# Patient Record
Sex: FEMALE | Race: WHITE
[De-identification: names, ages, dates, MRNs, and addresses within clinical notes are randomized per-mention and may not be internally consistent; named-entity substitution may affect disease eponyms.]

---

## 2017-02-22 ENCOUNTER — HOSPITAL ENCOUNTER (OUTPATIENT)
Dept: HOSPITAL 47 - BARWHC3 | Age: 37
Discharge: HOME | End: 2017-02-22
Payer: COMMERCIAL

## 2017-02-22 VITALS — HEART RATE: 80 BPM | TEMPERATURE: 98.7 F | SYSTOLIC BLOOD PRESSURE: 132 MMHG | DIASTOLIC BLOOD PRESSURE: 83 MMHG

## 2017-02-22 VITALS — BODY MASS INDEX: 44.3 KG/M2

## 2017-02-22 DIAGNOSIS — I11.9: ICD-10-CM

## 2017-02-22 DIAGNOSIS — E89.1: ICD-10-CM

## 2017-02-22 DIAGNOSIS — E55.9: ICD-10-CM

## 2017-02-22 DIAGNOSIS — Z01.818: Primary | ICD-10-CM

## 2017-02-22 DIAGNOSIS — E66.01: ICD-10-CM

## 2017-02-22 DIAGNOSIS — E44.0: ICD-10-CM

## 2017-02-22 DIAGNOSIS — D50.8: ICD-10-CM

## 2017-02-22 DIAGNOSIS — G47.30: ICD-10-CM

## 2017-02-22 DIAGNOSIS — E21.1: ICD-10-CM

## 2017-02-22 LAB
ALP SERPL-CCNC: 61 U/L (ref 38–126)
ALT SERPL-CCNC: 38 U/L (ref 9–52)
ANION GAP SERPL CALC-SCNC: 10 MMOL/L
AST SERPL-CCNC: 24 U/L (ref 14–36)
BUN SERPL-SCNC: 11 MG/DL (ref 7–17)
CALCIUM SPEC-MCNC: 9.9 MG/DL (ref 8.4–10.2)
CH: 29.6
CHCM: 32.4
CHLORIDE SERPL-SCNC: 101 MMOL/L (ref 98–107)
CHOLEST SERPL-MCNC: 174 MG/DL (ref ?–200)
CO2 SERPL-SCNC: 29 MMOL/L (ref 22–30)
EKG: (no result)
ERYTHROCYTE [DISTWIDTH] IN BLOOD BY AUTOMATED COUNT: 4.84 M/UL (ref 3.8–5.4)
ERYTHROCYTE [DISTWIDTH] IN BLOOD: 12.9 % (ref 11.5–15.5)
GLUCOSE SERPL-MCNC: 87 MG/DL (ref 74–99)
HCT VFR BLD AUTO: 44.4 % (ref 34–46)
HDLC SERPL-MCNC: 44 MG/DL (ref 40–60)
HDW: 2.59
HEMOGLOBIN A1C: 5.1 % (ref 4.2–6.1)
HGB BLD-MCNC: 14.1 GM/DL (ref 11.4–16)
IRON SERPL-MCNC: 46 UG/DL (ref 37–170)
MCH RBC QN AUTO: 29.2 PG (ref 25–35)
MCHC RBC AUTO-ENTMCNC: 31.8 G/DL (ref 31–37)
MCV RBC AUTO: 91.7 FL (ref 80–100)
NON-AFRICAN AMERICAN GFR(MDRD): >60
POTASSIUM SERPL-SCNC: 4.5 MMOL/L (ref 3.5–5.1)
PROT SERPL-MCNC: 7.4 G/DL (ref 6.3–8.2)
SODIUM SERPL-SCNC: 140 MMOL/L (ref 137–145)
TIBC SERPL-MCNC: 329 UG/DL (ref 265–497)
TRIGL SERPL-MCNC: 104 MG/DL (ref ?–150)
VIT B12 SERPL-MCNC: 564 PG/ML (ref 239–931)
WBC # BLD AUTO: 12.1 K/UL (ref 3.8–10.6)

## 2017-02-22 PROCEDURE — 85027 COMPLETE CBC AUTOMATED: CPT

## 2017-02-22 PROCEDURE — 99201: CPT

## 2017-02-22 PROCEDURE — 83540 ASSAY OF IRON: CPT

## 2017-02-22 PROCEDURE — 83550 IRON BINDING TEST: CPT

## 2017-02-22 PROCEDURE — 82306 VITAMIN D 25 HYDROXY: CPT

## 2017-02-22 PROCEDURE — 93005 ELECTROCARDIOGRAM TRACING: CPT

## 2017-02-22 PROCEDURE — 84443 ASSAY THYROID STIM HORMONE: CPT

## 2017-02-22 PROCEDURE — 80061 LIPID PANEL: CPT

## 2017-02-22 PROCEDURE — 82746 ASSAY OF FOLIC ACID SERUM: CPT

## 2017-02-22 PROCEDURE — 80323 ALKALOIDS NOS: CPT

## 2017-02-22 PROCEDURE — 82728 ASSAY OF FERRITIN: CPT

## 2017-02-22 PROCEDURE — 80053 COMPREHEN METABOLIC PANEL: CPT

## 2017-02-22 PROCEDURE — 83036 HEMOGLOBIN GLYCOSYLATED A1C: CPT

## 2017-02-22 PROCEDURE — 82607 VITAMIN B-12: CPT

## 2017-02-22 PROCEDURE — 84425 ASSAY OF VITAMIN B-1: CPT

## 2017-02-24 LAB — ANABASINE UR-MCNC: <2 NG/ML (ref ?–2)

## 2017-03-02 ENCOUNTER — HOSPITAL ENCOUNTER (EMERGENCY)
Dept: HOSPITAL 47 - EC | Age: 37
Discharge: HOME | End: 2017-03-02
Payer: COMMERCIAL

## 2017-03-02 VITALS
DIASTOLIC BLOOD PRESSURE: 81 MMHG | RESPIRATION RATE: 18 BRPM | HEART RATE: 72 BPM | TEMPERATURE: 98.1 F | SYSTOLIC BLOOD PRESSURE: 138 MMHG

## 2017-03-02 DIAGNOSIS — R79.1: ICD-10-CM

## 2017-03-02 DIAGNOSIS — Z87.891: ICD-10-CM

## 2017-03-02 DIAGNOSIS — M19.90: ICD-10-CM
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
## 2017-03-02 DIAGNOSIS — Z79.891: ICD-10-CM

## 2017-03-02 DIAGNOSIS — Z79.899: ICD-10-CM

## 2017-03-02 DIAGNOSIS — S39.011A: Primary | ICD-10-CM

## 2017-03-02 DIAGNOSIS — Z88.5: ICD-10-CM

## 2017-03-02 DIAGNOSIS — Z79.01: ICD-10-CM

## 2017-03-02 LAB
INR PPP: 1.7 (ref ?–1.1)
PT BLD: 16.7 SEC (ref 9–12)

## 2017-03-02 PROCEDURE — 36415 COLL VENOUS BLD VENIPUNCTURE: CPT

## 2017-03-02 PROCEDURE — 99284 EMERGENCY DEPT VISIT MOD MDM: CPT

## 2017-03-02 PROCEDURE — 85610 PROTHROMBIN TIME: CPT

## 2017-03-02 NOTE — US
EXAM:

  US Duplex Left Lower Extremity Veins.

 

CLINICAL HISTORY:

  Pain.

 

TECHNIQUE:

  Real-time ultrasound scan of the veins of the left lower extremity with 

color Doppler flow, spectral waveform analysis and compression.

 

COMPARISON:

  No relevant prior studies available.

 

FINDINGS:

  Deep veins:  No DVT in the visualized portions of the left external 

iliac vein, common femoral vein, femoral vein, popliteal vein and 

proximal calf veins.

  Superficial veins:  No thrombus in the visualized greater saphenous 

vein.

  Soft tissues:  No acute findings.  No popliteal cyst.

  Lymph nodes:  Lymph node visualized in the left groin, measuring 1.4 x 

0.9 x 1.5 cm.

 

IMPRESSION:     

1.  No evidence of DVT in the left lower extremity.

2.  Lymph node visualized in the left groin, measuring 1.4 x 0.9 x 1.5 cm.

## 2017-03-02 NOTE — ED
General Adult HPI





- General


Chief complaint: Urogenital


Stated complaint: Groin Pain


Time Seen by Provider: 03/02/17 21:58


Source: patient, RN notes reviewed


Mode of arrival: ambulatory


Limitations: no limitations





- History of Present Illness


Initial comments: 





37-year-old female with a past history of factor II presents to the emergency 

Department chief complaint of left thigh and groin pain.  Patient states she 

had no fall she had no trauma she had no changes in activity level today.  

Patient states that she went to get up and all of a sudden she had this pain to 

her left thigh.  Patient states that she took pain medication at home and 

rested it does not seem to be getting better.  Patient states it's worst 

walking worse with certain movements of the leg and worse to touch in certain 

areas.  Patient states she does have a history of blood clots in the past and 

her INR has been subtherapeutic recently so she was concerned.  Patient states 

that there is no other complaints at this time.Patient states the pain is 

moderate.Patient denies any recent fever, chills, shortness of breath, chest 

pain, back pain, abdominal pain, nausea vomiting, numbness or tingling, dysuria 

or hematuria, constipation or diarrhea, headaches or visual changes, or any 

other current symptoms.





- Related Data


 Home Medications











 Medication  Instructions  Recorded  Confirmed


 


Cyclobenzaprine [Flexeril] 10 mg PO HS 02/22/17 03/02/17


 


Gabapentin 600 mg PO TID 02/22/17 03/02/17


 


HYDROcodone/APAP 7.5-325MG [Norco 1 tab PO TID 02/22/17 03/02/17





7.5-325]   


 


Multivitamins, Thera [Multivitamin] 1 tab PO DAILY 02/22/17 03/02/17


 


Phentermine HCl 37.5 mg PO DAILY 02/22/17 03/02/17


 


Warfarin [Coumadin] 7.5 mg PO SUMOWEFRSA 02/22/17 03/02/17


 


Warfarin [Coumadin] 5 mg PO TUTH 03/02/17 03/02/17











 Allergies











Allergy/AdvReac Type Severity Reaction Status Date / Time


 


morphine Allergy  Confusion Verified 03/02/17 22:25














Review of Systems


ROS Statement: 


Those systems with pertinent positive or pertinent negative responses have been 

documented in the HPI.





ROS Other: All systems not noted in ROS Statement are negative.





Past Medical History


Past Medical History: Blood Disorder, Osteoarthritis (OA)


Additional Past Medical History / Comment(s): factor 2 disorder severe 

spondylosis of back


History of Any Multi-Drug Resistant Organisms: None Reported


Past Surgical History: Tonsillectomy


Additional Past Surgical History / Comment(s): splenectomy


Past Anesthesia/Blood Transfusion Reactions: No Reported Reaction


Past Psychological History: No Psychological Hx Reported


Smoking Status: Former smoker


Past Alcohol Use History: None Reported


Additional Past Alcohol Use History / Comment(s): quit 15 years ago


Past Drug Use History: None Reported





General Exam





- General Exam Comments


Initial Comments: 





General:  The patient is awake and alert, in no distress, and does not appear 

acutely ill.   


Neck:  The neck is supple, there is no tenderness.


Cardiovascular:  There is a regular rate and rhythm. No murmur, rub or gallop 

is appreciated.


Respiratory:  Lungs are clear to auscultation, respirations are non-labored, 

breath sounds are equal.  No wheezes, stridor, rales, or rhonchi.


Musculoskeletal: Sensation intact with pupils.  Left lower extremity.  Patient'

s range of motion of left ankle and left knee.  Patient is


Social left hip however she does have pain with ab duction as well as pain with 

forward flexion of the left leg.  There is pain along the medial aspect of the 

left thigh.  No swelling.


Neurological:  CN II-XII intact, There are no obvious motor or sensory 

deficits. Coordination appears grossly intact. Speech is normal.


Skin:  Skin is warm and dry and no rashes or lesions are noted. 


Psychiatric:  Normal mood and affect.  


Limitations: no limitations





Course


 Vital Signs











  03/02/17





  21:52


 


Temperature 97.5 F L


 


Pulse Rate 80


 


Respiratory 20





Rate 


 


Blood Pressure 170/79


 


O2 Sat by Pulse 98





Oximetry 














Medical Decision Making





- Medical Decision Making





37-year-old female notes for left groin pain.  At this time we will within THE 

DVT DUE TO PATIENT'S HISTORY.  At this time patient's ultrasound is negative 

for acute DVT.  Patient has





Lymph node.  This time patient's exam is not consistent with a left groin 

sprain.  We did discuss care follow-up and return parameters and all the patient

's questions.  She states she understood she hasn't injured and plan.  Patient 

will be discharged home.





- Lab Data


 Lab Results











  03/02/17 Range/Units





  22:13 


 


PT  16.7 H  (9.0-12.0)  sec


 


INR  1.7  (<1.1)  














- Radiology Data


Radiology results: report reviewed, image reviewed





Disposition


Clinical Impression: 


 Strain of left inguinal muscle, Subtherapeutic international normalized ratio (

INR)





Disposition: HOME SELF-CARE


Condition: Stable


Instructions:  Groin Strain (ED)


Additional Instructions: 


Please use medication as discussed. Please follow up with family doctor if 

symptoms have not improved over the next two days. Please return to the 

emergency room if your symptoms increase or worsen or for any other concerns. 


Referrals: 


Diogenes Kimbrough Jr, DO [Primary Care Provider] - 1-2 days


Time of Disposition: 23:27

## 2017-03-15 ENCOUNTER — HOSPITAL ENCOUNTER (OUTPATIENT)
Dept: HOSPITAL 47 - ORWHC2ENDO | Age: 37
Discharge: HOME | End: 2017-03-15
Payer: COMMERCIAL

## 2017-03-15 VITALS — DIASTOLIC BLOOD PRESSURE: 69 MMHG | SYSTOLIC BLOOD PRESSURE: 112 MMHG | HEART RATE: 60 BPM

## 2017-03-15 VITALS — BODY MASS INDEX: 44.1 KG/M2

## 2017-03-15 VITALS — TEMPERATURE: 97.1 F

## 2017-03-15 VITALS — RESPIRATION RATE: 18 BRPM

## 2017-03-15 DIAGNOSIS — Z82.49: ICD-10-CM

## 2017-03-15 DIAGNOSIS — Z79.01: ICD-10-CM

## 2017-03-15 DIAGNOSIS — Z87.891: ICD-10-CM

## 2017-03-15 DIAGNOSIS — K29.50: ICD-10-CM

## 2017-03-15 DIAGNOSIS — I26.99: ICD-10-CM

## 2017-03-15 DIAGNOSIS — Z79.899: ICD-10-CM

## 2017-03-15 DIAGNOSIS — Z88.5: ICD-10-CM

## 2017-03-15 DIAGNOSIS — K21.0: Primary | ICD-10-CM

## 2017-03-15 PROCEDURE — 88305 TISSUE EXAM BY PATHOLOGIST: CPT

## 2017-03-15 PROCEDURE — 81025 URINE PREGNANCY TEST: CPT

## 2017-03-15 PROCEDURE — 88342 IMHCHEM/IMCYTCHM 1ST ANTB: CPT

## 2017-03-15 PROCEDURE — 43239 EGD BIOPSY SINGLE/MULTIPLE: CPT

## 2017-03-15 NOTE — P.PCN
Date of Procedure: 03/15/17


Description of Procedure: 











PREOPERATIVE DIAGNOSIS:


Gastroesophageal reflux disease.





POSTOPERATIVE DIAGNOSIS:


Chronic gastritis.


Gastroesophageal reflux disease with esophagitis.





OPERATION:


Esophagogastroduodenoscopy with biopsies along antrum.





SURGEON: Lora Arias MD





ANESTHESIA: MAC.





INDICATIONS:


The patient is a 37-year-old female who presents with a history of reflux 

disease. Benefits and risks of the procedure were described. Informed consent 

was obtained.





DESCRIPTION:


The patient was brought into the endoscopy suite and laid in the left lateral 

decubitus position. An Olympus gastroscope was passed along the posterior 

oropharynx down to the distal esophagus where the squamocolumnar junction was 

encountered at 39 cm from the incisors. The stomach was entered and minimal 

bile reflux was found.  Additional findings are listed below. Biopsies with 

cold forceps were obtained of the antrum. The first through third portion of 

the duodenum was examined and unremarkable. Retroflexion of the scope confirmed

  Hill grade 2 lower esophageal valve. The squamocolumnar junction demostrated 

LA grade A erosive esophagitis. The stomach was desufflated. The patient 

tolerated the procedure well.





FINDINGS:


Squamocolumnar junction 39 cm from the incisors.


Diaphragmatic hiatus at 39 cm from the incisors


Hill grade 2lower esophageal valve.


LA grade A erosive esophagitis.


Active gastritis superficial along antrum.


No active duodenitis.





RECOMMENDATIONS:


Further recommendations pending results of pathology report.





Plan - Discharge Summary


Discharge Medication List





Cyclobenzaprine [Flexeril] 10 mg PO HS 02/22/17 [History]


Gabapentin 600 mg PO BID 02/22/17 [History]


HYDROcodone/APAP 7.5-325MG [Norco 7.5-325] 1 tab PO TID PRN 02/22/17 [History]


Multivitamins, Thera [Multivitamin] 1 tab PO DAILY 02/22/17 [History]


Phentermine HCl 37.5 mg PO DAILY 02/22/17 [History]


Warfarin [Coumadin] 7.5 mg PO SUMOWEFRSA 02/22/17 [History]


Warfarin [Coumadin] 5 mg PO TUTH 03/02/17 [History]


Gabapentin [Neurontin] 1,200 mg PO HS 03/10/17 [History]

## 2017-03-15 NOTE — P.GSHP
History of Present Illness


H&P Date: 03/15/17














CHIEF COMPLAINT: GERD





HISTORY OF PRESENT ILLNESS: The patient is a 37-year-old female who


presents reports gastroesophageal reflux disease.  Upper endoscopy was offered 

for further evaluation and management.





PAST MEDICAL HISTORY: 


Please see list.





PAST SURGICAL HISTORY: 


Please see list.





MEDICATIONS: 


Please see list.





ALLERGIES:  Please see list. 





SOCIAL HISTORY: No illicit drug use





FAMILY HISTORY: No reports of Crohn disease or ulcerative colitis. 





REVIEW OF ORGAN SYSTEMS: 


CONSTITUTIONAL: No reports of fevers or chills. 


GI:  Denies any blood in stools or constipation. 





PHYSICAL EXAM: 


VITAL SIGNS:  Stable


GENERAL: Well-developed and pleasant in no acute distress. 


HEENT: No scleral icterus. Extraocular movements grossly


intact. Moist buccal mucosa. 


NECK: Supple without lymphadenopathy. 


CHEST: Unlabored respirations. Equal bilateral excursions. 


CARDIOVASCULAR: Regular rate and rhythm. Distal 2+ pulses. 


ABDOMEN: Soft, nondistended.  


MUSCULOSKELETAL: No clubbing, cyanosis, or edema. 





ASSESSMENT: 


1.  Gastroesophageal reflux disease





PLAN: 


1. Recommend proceeding with an upper endoscopy





Past Medical History


Past Medical History: Blood Disorder, Osteoarthritis (OA), Pulmonary Embolus (PE

)


Additional Past Medical History / Comment(s): factor 2 clotting disorder, 

severe spondylosis of back,has Mirena IUD


History of Any Multi-Drug Resistant Organisms: None Reported


Past Surgical History: Cholecystectomy, Tonsillectomy


Additional Past Surgical History / Comment(s): splenectomy


Past Anesthesia/Blood Transfusion Reactions: Motion Sickness


Additional Past Anesthesia/Blood Transfusion Reaction / Comment(s): no problems 

with prior blood transfusions


Past Psychological History: No Psychological Hx Reported


Smoking Status: Former smoker


Past Alcohol Use History: None Reported


Additional Past Alcohol Use History / Comment(s): quit 15 years ago,smoked 

approx 8 <1ppd


Past Drug Use History: None Reported





- Past Family History


  ** Mother


Family Medical History: Pulmonary Embolus


Additional Family Medical History / Comment(s): Factor 2 clotting disorder





  ** Father


Family Medical History: CVA/TIA, Diabetes Mellitus, Hypertension, Myocardial 

Infarction (MI)





Medications and Allergies


 Home Medications











 Medication  Instructions  Recorded  Confirmed  Type


 


Cyclobenzaprine [Flexeril] 10 mg PO HS 02/22/17 03/10/17 History


 


Gabapentin 600 mg PO BID 02/22/17 03/10/17 History


 


HYDROcodone/APAP 7.5-325MG [Norco 1 tab PO TID PRN 02/22/17 03/10/17 History





7.5-325]    


 


Multivitamins, Thera [Multivitamin] 1 tab PO DAILY 02/22/17 03/10/17 History


 


Phentermine HCl 37.5 mg PO DAILY 02/22/17 03/10/17 History


 


Warfarin [Coumadin] 7.5 mg PO SUMOWEFRSA 02/22/17 03/10/17 History


 


Warfarin [Coumadin] 5 mg PO TUTH 03/02/17 03/10/17 History


 


Gabapentin [Neurontin] 1,200 mg PO HS 03/10/17 03/10/17 History











 Allergies











Allergy/AdvReac Type Severity Reaction Status Date / Time


 


morphine Allergy  unresponsiv Verified 03/10/17 15:52





   e

## 2017-04-03 NOTE — CONS
DATE OF CONSULTATION:  02/22/ 2017. 



CHIEF COMPLAINT: Initial bariatric assessment. 



HISTORY OF PRESENT ILLNESS: 

Marlin Zhong is a 37-year-old female who presents for initial 

bariatric assessment. At her height of 5 foot 7 and quarter inch frame 

she comes in weighing 285 pounds. Her ideal body weight is 178 pounds. 

She is 107 pounds overweight. Body mass index is 44.3. She reports a 

personal history of factor II disorder as a result she is on Coumadin. 

She reports previous history of pulmonary embolism. She denies any 

dyspnea however. She is evaluating for sleeve gastrectomy. She denies any 

familial history of bariatric procedures or obesity. Her highest 

personal weight has been 319 pounds. She has been successful to get 

down to 285 pounds at present. She is currently on Weight Watchers. 

The most she has been able to lose is 40 pounds. She has been also on 

Adipex since last November. She has completed at minimum 4 month 

medical supervised weight loss. She has to do a 6 month medical 

supervised weight loss. As a result of her obesity, she reports lower 

back pain for the past 8 years. She also reports troubles 

with the left hip as well as sciatica. She has foot drop. She reports 

obstructive sleep apnea. No reports of lupus or Crohn's disease in her 

family. She denies any food allergies or diarrhea. She does have 

constipation. Her gallbladder is gone. She reports previous history of 

splenectomy from a spontaneous rupture. She has already obtained her 

immunizations for asplenia. Now she presents for further evaluation 

and management.  



PAST MEDICAL HISTORY: 

1. 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 

2. Factor II disorder. 

3. Previous history of pulmonary embolism to the lungs. 

4. Osteoarthritis of the lower back. 

5. Osteoarthritis of the left hip. 

6. Sciatica of the left leg. 

7. History of foot drop. 

8. Obstructive sleep apnea. 

9. Morbid obesity due to excess calories. 

10. Chronic pain syndrome. 

11. History of spondylolisthesis. 



PAST SURGICAL HISTORY: 

1. Tonsillectomy. 

2. Splenectomy. 

3. Cholecystectomy. 



MEDICATIONS:

1. Gabapentin. 

2. Norco 7.5.

3. Flexeril. 

4. Adipex.

5. Multivitamin. 

6. Coumadin. 



ALLERGIES: MORPHINE. 



SOCIAL HISTORY: Former tobacco user. 



No reports of esophageal or stomach cancer. 



REVIEW OF SYSTEMS:

CONSTITUTIONAL: Ideal body weight of 178 pounds. Highest weight was 

315 pounds. Current weight of 285 pounds. She is 107 pounds 

overweight. Body mass index of 44.3.  

HEENT: No troubles with vision, hearing. Denies dysphagia. 

ENDOCRINE: No reports of diabetes or thyroid disorders. 

RESPIRATORY: Denies any dyspnea on exertion. She has previous history 

of pulmonary embolism.  

CARDIOVASCULAR: No reports of palpitations or heart attack. 

GASTROINTESTINAL: No reports of food allergies. No reports of 

diarrhea; however, she has constipation.  

MUSCULOSKELETAL: Has spondylosis of the lower back. Chronic lower back 

pain. Also reports osteoarthritis of the hip and sciatica.  

NEURO: No reports of stroke or seizure disorder. Has chronic pain. 

PSYCH: No reports of depression or suicidal ideation.  

HEMATOLOGIC: History of hypercoagulable disorder. Previous history of 

pulmonary embolism. She is on chronic Coumadin therapy.  



PHYSICAL EXAM:

VITAL SIGNS: 98.7, 80, 132/83; 5 foot 7 and quarter inch frame, 285 

pounds. Body mass index 44.3.  

GENERAL: Well-developed, pleasant female in no acute distress. 

HEENT: No scleral icterus. Extraocular movements grossly intact. Moist 

buccal mucosa.  

NECK: Supple without lymphadenopathy. 

CHEST: Nonlabored respirations with equal breath excursions. 

CARDIOVASCULAR: Regular rate and rhythm. 

ABDOMEN: Soft, nontender, nondistended. 

MUSCULOSKELETAL: No clubbing, cyanosis, or edema. 

NEURO: No focal or lateralizing signs. 

PSYCH: Appropriate affect. Alert and oriented to person, place, and 

time.  



LABS: White count elevated at 12.1. Platelets elevated at 544. Percent 

iron saturation low at 14. Hemoglobin A1c normal at 5.1. Cholesterol 

normal at 174. LDL elevated at 109. Vitamin D low normal at 29.1. 

Urine cotinine completely negative.  



STUDIES: EKG reviewed, demonstrates normal sinus rhythm. 



ASSESSMENT:

1. Morbid obesity due to excess calories. 

2. Body mass index 44.3. 

3. Medical supervised weight loss. 

4. Dietary surveillance and counseling. 

5. Personal history of hypercoagulable disorder factor II. 

6. Previous history of pulmonary embolism. 

7. Spondylosis of the lower spine. 

8. Osteoarthritis of the lower back. 

9. Osteoarthritis of the left hip. 

10. Previous history of foot drop. 

11. Obstructive sleep apnea. 

12. Asplenia. 

13. Hypertension. 

14. 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 

15. 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 

16. Vitamin D deficiency. 



PLAN:

1. I have recommended she complete a bariatric metabolic panel. 

Vitamin D supplement at least 1000 units would be of benefit.  

2. Recommend medical risk assessment. 

3. She had completed her 6 month medical supervised weight loss per 

insurance guidelines.  

4. Psych assessment per insurance guidelines. 

5. Recommend upper endoscopy as she is evaluating for gastrectomy-type 

procedures.  

6. With her history of factor II disorder including Coumadin, she is 

high surgical risk for thromboembolic event. Additionally, she is also 

high surgical risk for bleeding as she is on chronic anticoagulation.  

7. Recommend further evaluation and treatment for obstructive sleep 

apnea.  

8. [place] bariatric surgery collaborative outcomes calculator was reviewed 
for the sleeve, band, and gastric bypass including risks.



Thank you for this kind consultation. 



SUMMER

## 2017-04-05 ENCOUNTER — HOSPITAL ENCOUNTER (OUTPATIENT)
Dept: HOSPITAL 47 - BARWHC3 | Age: 37
Discharge: HOME | End: 2017-04-05
Payer: COMMERCIAL

## 2017-04-05 VITALS — BODY MASS INDEX: 44.6 KG/M2

## 2017-04-05 VITALS — SYSTOLIC BLOOD PRESSURE: 160 MMHG | TEMPERATURE: 98.8 F | HEART RATE: 74 BPM | DIASTOLIC BLOOD PRESSURE: 84 MMHG

## 2017-04-05 DIAGNOSIS — D47.3: ICD-10-CM

## 2017-04-05 DIAGNOSIS — M16.12: ICD-10-CM

## 2017-04-05 DIAGNOSIS — K29.50: ICD-10-CM

## 2017-04-05 DIAGNOSIS — G89.4: ICD-10-CM

## 2017-04-05 DIAGNOSIS — E55.9: ICD-10-CM

## 2017-04-05 DIAGNOSIS — M43.10: ICD-10-CM

## 2017-04-05 DIAGNOSIS — D68.2: ICD-10-CM

## 2017-04-05 DIAGNOSIS — Z01.818: Primary | ICD-10-CM

## 2017-04-05 DIAGNOSIS — Z88.5: ICD-10-CM

## 2017-04-05 DIAGNOSIS — M21.379: ICD-10-CM

## 2017-04-05 DIAGNOSIS — M47.9: ICD-10-CM

## 2017-04-05 DIAGNOSIS — D72.829: ICD-10-CM

## 2017-04-05 DIAGNOSIS — Z90.81: ICD-10-CM

## 2017-04-05 DIAGNOSIS — Z79.01: ICD-10-CM

## 2017-04-05 DIAGNOSIS — E66.01: ICD-10-CM

## 2017-04-05 DIAGNOSIS — Z87.891: ICD-10-CM

## 2017-04-05 DIAGNOSIS — M54.32: ICD-10-CM

## 2017-04-05 DIAGNOSIS — Z86.711: ICD-10-CM

## 2017-04-05 DIAGNOSIS — Z79.899: ICD-10-CM

## 2017-04-05 PROCEDURE — 99211 OFF/OP EST MAY X REQ PHY/QHP: CPT

## 2017-04-09 NOTE — P.PN
Progress Note - Text





DATE OF CONSULTATION:  02/22/ 2017. 





CHIEF COMPLAINT: Initial bariatric assessment. 





HISTORY OF PRESENT ILLNESS: 


Marlin Zhong is a 37-year-old female who presents for initial 


bariatric assessment. At her height of 5 foot 7 and quarter inch frame 


she comes in weighing 285 pounds. Her ideal body weight is 178 pounds. 


She is 107 pounds overweight. Body mass index is 44.3. She reports a 


personal history of factor II disorder as a result she is on Coumadin. 


She reports previous history of pulmonary embolism. She denies any 


dyspnea however. She is evaluating for sleeve gastrectomy. She denies any 


familial history of bariatric procedures or obesity. Her highest 


personal weight has been 319 pounds. She has been successful to get 


down to 285 pounds at present. She is currently on Weight Watchers. 


The most she has been able to lose is 40 pounds. She has been also on 


Adipex since last November. She has completed at minimum 4 month 


medical supervised weight loss. She has to do a 6 month medical 


supervised weight loss. As a result of her obesity, she reports lower 


back pain for the past 8 years. She also reports troubles 


with the left hip as well as sciatica. She has foot drop. She reports 


obstructive sleep apnea. No reports of lupus or Crohn's disease in her 


family. She denies any food allergies or diarrhea. She does have 


constipation. Her gallbladder is gone. She reports previous history of 


splenectomy from a spontaneous rupture. She has already obtained her 


immunizations for asplenia. Now she presents for further evaluation 


and management.  





PAST MEDICAL HISTORY: 


1. 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 


2. Factor II disorder. 


3. Previous history of pulmonary embolism to the lungs. 


4. Osteoarthritis of the lower back. 


5. Osteoarthritis of the left hip. 


6. Sciatica of the left leg. 


7. History of foot drop. 


8. Obstructive sleep apnea. 


9. Morbid obesity due to excess calories. 


10. Chronic pain syndrome. 


11. History of spondylolisthesis. 





PAST SURGICAL HISTORY: 


1. Tonsillectomy. 


2. Splenectomy. 


3. Cholecystectomy. 





MEDICATIONS:


1. Gabapentin. 


2. Norco 7.5.


3. Flexeril. 


4. Adipex.


5. Multivitamin. 


6. Coumadin. 





ALLERGIES: MORPHINE. 





SOCIAL HISTORY: Former tobacco user. 





No reports of esophageal or stomach cancer. 





REVIEW OF SYSTEMS:


CONSTITUTIONAL: Ideal body weight of 178 pounds. Highest weight was 


315 pounds. Current weight of 285 pounds. She is 107 pounds 


overweight. Body mass index of 44.3.  


HEENT: No troubles with vision, hearing. Denies dysphagia. 


ENDOCRINE: No reports of diabetes or thyroid disorders. 


RESPIRATORY: Denies any dyspnea on exertion. She has previous history 


of pulmonary embolism.  


CARDIOVASCULAR: No reports of palpitations or heart attack. 


GASTROINTESTINAL: No reports of food allergies. No reports of 


diarrhea; however, she has constipation.  


MUSCULOSKELETAL: Has spondylosis of the lower back. Chronic lower back 


pain. Also reports osteoarthritis of the hip and sciatica.  


NEURO: No reports of stroke or seizure disorder. Has chronic pain. 


PSYCH: No reports of depression or suicidal ideation.  


HEMATOLOGIC: History of hypercoagulable disorder. Previous history of 


pulmonary embolism. She is on chronic Coumadin therapy.  





PHYSICAL EXAM:


VITAL SIGNS: 98.7, 80, 132/83; 5 foot 7 and quarter inch frame, 285 


pounds. Body mass index 44.3.  


GENERAL: Well-developed, pleasant female in no acute distress. 


HEENT: No scleral icterus. Extraocular movements grossly intact. Moist 


buccal mucosa.  


NECK: Supple without lymphadenopathy. 


CHEST: Nonlabored respirations with equal breath excursions. 


CARDIOVASCULAR: Regular rate and rhythm. 


ABDOMEN: Soft, nontender, nondistended. 


MUSCULOSKELETAL: No clubbing, cyanosis, or edema. 


NEURO: No focal or lateralizing signs. 


PSYCH: Appropriate affect. Alert and oriented to person, place, and 


time.  





LABS: White count elevated at 12.1. Platelets elevated at 544. Percent 


iron saturation low at 14. Hemoglobin A1c normal at 5.1. Cholesterol 


normal at 174. LDL elevated at 109. Vitamin D low normal at 29.1. 


Urine cotinine completely negative.  





STUDIES: EKG reviewed, demonstrates normal sinus rhythm. 





ASSESSMENT:


1. Morbid obesity due to excess calories. 


2. Body mass index 44.3. 


3. Medical supervised weight loss. 


4. Dietary surveillance and counseling. 


5. Personal history of hypercoagulable disorder factor II. 


6. Previous history of pulmonary embolism. 


7. Spondylosis of the lower spine. 


8. Osteoarthritis of the lower back. 


9. Osteoarthritis of the left hip. 


10. Previous history of foot drop. 


11. Obstructive sleep apnea. 


12. Asplenia. 


13. Hypertension. 


14. 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 


15. 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 


16. Vitamin D deficiency. 





PLAN:


1. I have recommended she complete a bariatric metabolic panel. 


Vitamin D supplement at least 1000 units would be of benefit.  


2. Recommend medical risk assessment. 


3. She had completed her 6 month medical supervised weight loss per 


insurance guidelines.  


4. Psych assessment per insurance guidelines. 


5. Recommend upper endoscopy as she is evaluating for gastrectomy-type 


procedures.  


6. With her history of factor II disorder including Coumadin, she is 


high surgical risk for thromboembolic event. Additionally, she is also 


high surgical risk for bleeding as she is on chronic anticoagulation.  


7. Recommend further evaluation and treatment for obstructive sleep 


apnea.  


8. [place] bariatric surgery collaborative outcomes calculator was reviewed 

for the sleeve, band, and gastric bypass including risks.





Thank you for this kind consultation.

## 2017-04-22 NOTE — PN
DATE OF SERVICE: 04/05/2017 



CHIEF COMPLAINT: Bariatric assessment. 



HISTORY OF PRESENT ILLNESS: 

Marlin Zhong is a very pleasant 37-year-old female who initially 

presented to the [facility] in February 2017. She reports 

previous history of pulmonary embolism including Factor II disorder as 

she is on Coumadin. At her height of 5 feet 7-1/4 inches she comes in 

today weighing 286 pounds. She has actually gained 2 pounds in the 

past 2 months. Body mass index has now increased from 44.4 up to 44.6. 

She is 128 pounds overweight. Separately, she had presented to the ER 

early last month for groin pain. She states this has now resolved. She 

also completed an upper endoscopy with findings consistent with 

gastroesophageal reflux disease, erosive esophagitis. Chronic 

gastritis was also identified. No large diaphragmatic hiatal hernia 

was identified.  





PAST MEDICAL HISTORY: 

1. 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 

2. Factor II disorder. 

3. Previous history of pulmonary embolism to the lungs. 

4. Osteoarthritis of the lower back. 

5. Osteoarthritis of the left hip. 

6. Sciatica of the left leg. 

7. History of foot drop. 

8. Obstructive sleep apnea. 

9. Morbid obesity due to excess calories. 

10. Chronic pain syndrome. 

11. History of spondylolisthesis. 



PAST SURGICAL HISTORY: 

1. Tonsillectomy. 

2. Splenectomy. 

3. Cholecystectomy. 



MEDICATIONS:

1. Gabapentin. 

2. Norco 7.5.

3. Flexeril. 

4. Adipex.

5. Multivitamin. 

6. Coumadin. 



ALLERGIES: MORPHINE. 



SOCIAL HISTORY: Former tobacco user. 



FAMILY HISTORY: No reports of esophageal or stomach cancer. 



REVIEW OF SYSTEMS:

CONSTITUTIONAL: Ideal body weight of 178 pounds. Highest weight was 

315 pounds. At her height of 5 feet 7-1/4 inches she comes in 

today weighing 286 pounds. She has actually gained 2 pounds in the 

past 2 months. Body mass index has now increased from 44.4 up to 44.6. 

She is 128 pounds overweight. 

HEENT: No troubles with vision, hearing. Denies dysphagia. 

ENDOCRINE: No reports of diabetes or thyroid disorders. 

RESPIRATORY: Denies any dyspnea on exertion. She has previous history 

of pulmonary embolism.  

CARDIOVASCULAR: No reports of palpitations or heart attack. 

GASTROINTESTINAL: No reports of food allergies. No reports of 

diarrhea; however, she has constipation.  

MUSCULOSKELETAL: Has spondylosis of the lower back. Chronic lower back 

pain. Also reports osteoarthritis of the hip and sciatica.  

NEURO: No reports of stroke or seizure disorder. Has chronic pain. 

PSYCH: No reports of depression or suicidal ideation.  

HEMATOLOGIC: History of hypercoagulable disorder. Previous history of 

pulmonary embolism. She is on chronic Coumadin therapy.  



PHYSICAL EXAM:

VITAL SIGNS: 98.8, 74, 160/84; 5 foot 7-1/4 frame, 286 

pounds. Body mass index 44.3.  

GENERAL: Well-developed, pleasant female in no acute distress. 

HEENT: No scleral icterus. Extraocular movements grossly intact. Moist 

buccal mucosa.  

NECK: Supple without lymphadenopathy. 

CHEST: Nonlabored respirations with equal breath excursions. 

CARDIOVASCULAR: Regular rate and rhythm. 

ABDOMEN: Soft, nontender, nondistended. 

MUSCULOSKELETAL: No clubbing, cyanosis, or edema. 

NEURO: No focal or lateralizing signs. 

PSYCH: Appropriate affect. Alert and oriented to person, place, and 

time.  



Pathology report demonstrated no evidence of H. pylori bacteria. 

Chronic gastritis was identified.  



LABS: Bariatric metabolic panel demonstrated elevated white count of 

over 12,000. Platelet count was also elevated at 544. Percent iron 

saturation was low at 14. Vitamin D was slightly low at 29.9. Urine 

nicotine was negative. 



EKG demonstrated normal sinus rhythm.  



ASSESSMENT: 

1. Morbid obesity due to excess calories. 

2. Body mass index increased to 44.6. 

3. Medical supervised weight loss. 

4. Dietary surveillance and counseling. 

5. Personal history of hypercoagulable disorder factor II. 

6. Previous history of pulmonary embolism. 

7. Spondylosis of the lower spine. 

8. Osteoarthritis of the lower back. 

9. Osteoarthritis of the left hip. 

10. Previous history of foot drop. 

11. Obstructive sleep apnea. 

12. Asplenia. 

13. Hypertension. 

14. 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 

15. 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 

16. Vitamin D deficiency. 

17. Chronic gastritis. 

18. Chronic anticoagulation. 



PLAN: 

1. I reviewed her labs, including elevated white count and platelets. 

She then disclosed this is secondary to her spleen being removed, 

which is consistent with her asplenia.

2. On further review of her options, she has elected for a sleeve gastrectomy. 

3. She is still pending completion of her psych assessment. 

4. She is still pending completion of bariatric dietitian. 

5. She is at increased risk for bleeding as she is on chronic Coumadin 

therapy. Additionally with her factor II disorder, additional 

recommendations per her hematologist will be advisable.  

6. She will return upon completion of her bariatric profile, which 

includes both her psych and medical risk assessment, including dietary 

classes.  

7. Her vitamin D is low for which 1000 units of vitamin D 

supplement would be advisable.  



SUMMER

## 2017-04-28 NOTE — P.PN
Progress Note - Text











DATE OF SERVICE: 04/05/2017 





CHIEF COMPLAINT: Bariatric assessment. 





HISTORY OF PRESENT ILLNESS: 


Marlin Zhong is a very pleasant 37-year-old female who initially 


presented to the [facility] in February 2017. She reports 


previous history of pulmonary embolism including Factor II disorder as 


she is on Coumadin. At her height of 5 feet 7-1/4 inches she comes in 


today weighing 286 pounds. She has actually gained 2 pounds in the 


past 2 months. Body mass index has now increased from 44.4 up to 44.6. 


She is 128 pounds overweight. Separately, she had presented to the ER 


early last month for groin pain. She states this has now resolved. She 


also completed an upper endoscopy with findings consistent with 


gastroesophageal reflux disease, erosive esophagitis. Chronic 


gastritis was also identified. No large diaphragmatic hiatal hernia 


was identified.  








PAST MEDICAL HISTORY: 


1. 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 


2. Factor II disorder. 


3. Previous history of pulmonary embolism to the lungs. 


4. Osteoarthritis of the lower back. 


5. Osteoarthritis of the left hip. 


6. Sciatica of the left leg. 


7. History of foot drop. 


8. Obstructive sleep apnea. 


9. Morbid obesity due to excess calories. 


10. Chronic pain syndrome. 


11. History of spondylolisthesis. 





PAST SURGICAL HISTORY: 


1. Tonsillectomy. 


2. Splenectomy. 


3. Cholecystectomy. 





MEDICATIONS:


1. Gabapentin. 


2. Norco 7.5.


3. Flexeril. 


4. Adipex.


5. Multivitamin. 


6. Coumadin. 





ALLERGIES: MORPHINE. 





SOCIAL HISTORY: Former tobacco user. 





FAMILY HISTORY: No reports of esophageal or stomach cancer. 





REVIEW OF SYSTEMS:


CONSTITUTIONAL: Ideal body weight of 178 pounds. Highest weight was 


315 pounds. At her height of 5 feet 7-1/4 inches she comes in 


today weighing 286 pounds. She has actually gained 2 pounds in the 


past 2 months. Body mass index has now increased from 44.4 up to 44.6. 


She is 128 pounds overweight. 


HEENT: No troubles with vision, hearing. Denies dysphagia. 


ENDOCRINE: No reports of diabetes or thyroid disorders. 


RESPIRATORY: Denies any dyspnea on exertion. She has previous history 


of pulmonary embolism.  


CARDIOVASCULAR: No reports of palpitations or heart attack. 


GASTROINTESTINAL: No reports of food allergies. No reports of 


diarrhea; however, she has constipation.  


MUSCULOSKELETAL: Has spondylosis of the lower back. Chronic lower back 


pain. Also reports osteoarthritis of the hip and sciatica.  


NEURO: No reports of stroke or seizure disorder. Has chronic pain. 


PSYCH: No reports of depression or suicidal ideation.  


HEMATOLOGIC: History of hypercoagulable disorder. Previous history of 


pulmonary embolism. She is on chronic Coumadin therapy.  





PHYSICAL EXAM:


VITAL SIGNS: 98.8, 74, 160/84; 5 foot 7-1/4 frame, 286 


pounds. Body mass index 44.3.  


GENERAL: Well-developed, pleasant female in no acute distress. 


HEENT: No scleral icterus. Extraocular movements grossly intact. Moist 


buccal mucosa.  


NECK: Supple without lymphadenopathy. 


CHEST: Nonlabored respirations with equal breath excursions. 


CARDIOVASCULAR: Regular rate and rhythm. 


ABDOMEN: Soft, nontender, nondistended. 


MUSCULOSKELETAL: No clubbing, cyanosis, or edema. 


NEURO: No focal or lateralizing signs. 


PSYCH: Appropriate affect. Alert and oriented to person, place, and 


time.  





Pathology report demonstrated no evidence of H. pylori bacteria. 


Chronic gastritis was identified.  





LABS: Bariatric metabolic panel demonstrated elevated white count of 


over 12,000. Platelet count was also elevated at 544. Percent iron 


saturation was low at 14. Vitamin D was slightly low at 29.9. Urine 


nicotine was negative. 





EKG demonstrated normal sinus rhythm.  





ASSESSMENT: 


1. Morbid obesity due to excess calories. 


2. Body mass index increased to 44.6. 


3. Medical supervised weight loss. 


4. Dietary surveillance and counseling. 


5. Personal history of hypercoagulable disorder factor II. 


6. Previous history of pulmonary embolism. 


7. Spondylosis of the lower spine. 


8. Osteoarthritis of the lower back. 


9. Osteoarthritis of the left hip. 


10. Previous history of foot drop. 


11. Obstructive sleep apnea. 


12. Asplenia. 


13. Hypertension. 


14. 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 


15. 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 


16. Vitamin D deficiency. 


17. Chronic gastritis. 


18. Chronic anticoagulation. 





PLAN: 


1. I reviewed her labs, including elevated white count and platelets. 


She then disclosed this is secondary to her spleen being removed, 


which is consistent with her asplenia.


2. On further review of her options, she has elected for a sleeve gastrectomy. 


3. She is still pending completion of her psych assessment. 


4. She is still pending completion of bariatric dietitian. 


5. She is at increased risk for bleeding as she is on chronic Coumadin 


therapy. Additionally with her factor II disorder, additional 


recommendations per her hematologist will be advisable.  


6. She will return upon completion of her bariatric profile, which 


includes both her psych and medical risk assessment, including dietary 


classes.  


7. Her vitamin D is low for which 1000 units of vitamin D 


supplement would be advisable.

## 2017-05-08 ENCOUNTER — HOSPITAL ENCOUNTER (EMERGENCY)
Dept: HOSPITAL 47 - EC | Age: 37
LOS: 1 days | Discharge: HOME | End: 2017-05-09
Payer: COMMERCIAL

## 2017-05-08 VITALS — RESPIRATION RATE: 18 BRPM

## 2017-05-08 DIAGNOSIS — R07.1: Primary | ICD-10-CM

## 2017-05-08 DIAGNOSIS — J98.11: ICD-10-CM

## 2017-05-08 DIAGNOSIS — Z79.01: ICD-10-CM

## 2017-05-08 DIAGNOSIS — M19.90: ICD-10-CM

## 2017-05-08 DIAGNOSIS — Z88.5: ICD-10-CM

## 2017-05-08 DIAGNOSIS — D68.2: ICD-10-CM

## 2017-05-08 DIAGNOSIS — Z87.891: ICD-10-CM

## 2017-05-08 DIAGNOSIS — Z79.899: ICD-10-CM

## 2017-05-08 DIAGNOSIS — M47.9: ICD-10-CM

## 2017-05-08 LAB
ALP SERPL-CCNC: 65 U/L (ref 38–126)
ALT SERPL-CCNC: 33 U/L (ref 9–52)
AMYLASE SERPL-CCNC: 54 U/L (ref 30–110)
ANION GAP SERPL CALC-SCNC: 9 MMOL/L
APTT BLD: 31.6 SEC (ref 22–30)
AST SERPL-CCNC: 27 U/L (ref 14–36)
BUN SERPL-SCNC: 15 MG/DL (ref 7–17)
CALCIUM SPEC-MCNC: 9.5 MG/DL (ref 8.4–10.2)
CELLS COUNTED: 100
CH: 29.4
CHCM: 32.7
CHLORIDE SERPL-SCNC: 106 MMOL/L (ref 98–107)
CK SERPL-CCNC: 164 U/L (ref 30–135)
CO2 SERPL-SCNC: 24 MMOL/L (ref 22–30)
ERYTHROCYTE [DISTWIDTH] IN BLOOD BY AUTOMATED COUNT: 4.84 M/UL (ref 3.8–5.4)
ERYTHROCYTE [DISTWIDTH] IN BLOOD: 13.4 % (ref 11.5–15.5)
GLUCOSE SERPL-MCNC: 110 MG/DL (ref 74–99)
HCT VFR BLD AUTO: 43.8 % (ref 34–46)
HDW: 2.57
HGB BLD-MCNC: 14.3 GM/DL (ref 11.4–16)
INR PPP: 2.4 (ref ?–1.1)
MAGNESIUM SPEC-SCNC: 1.9 MG/DL (ref 1.6–2.3)
MCH RBC QN AUTO: 29.5 PG (ref 25–35)
MCHC RBC AUTO-ENTMCNC: 32.6 G/DL (ref 31–37)
MCV RBC AUTO: 90.5 FL (ref 80–100)
NON-AFRICAN AMERICAN GFR(MDRD): >60
PARTICLE COUNT: (no result)
PH UR: 6 [PH] (ref 5–8)
POTASSIUM SERPL-SCNC: 4.1 MMOL/L (ref 3.5–5.1)
PROT SERPL-MCNC: 7.5 G/DL (ref 6.3–8.2)
PT BLD: 23.2 SEC (ref 9–12)
RBC UR QL: 16 /HPF (ref 0–5)
SODIUM SERPL-SCNC: 139 MMOL/L (ref 137–145)
SP GR UR: 1.02 (ref 1–1.03)
SQUAMOUS UR QL AUTO: 23 /HPF (ref 0–4)
TROPONIN I SERPL-MCNC: <0.012 NG/ML (ref 0–0.03)
UA BILLING (MACRO VS. MICRO): (no result)
UROBILINOGEN UR QL STRIP: <2 MG/DL (ref ?–2)
WBC # BLD AUTO: 13.9 K/UL (ref 3.8–10.6)
WBC #/AREA URNS HPF: 9 /HPF (ref 0–5)
WBC (PEROX): 13.54

## 2017-05-08 PROCEDURE — 82550 ASSAY OF CK (CPK): CPT

## 2017-05-08 PROCEDURE — 96374 THER/PROPH/DIAG INJ IV PUSH: CPT

## 2017-05-08 PROCEDURE — 80053 COMPREHEN METABOLIC PANEL: CPT

## 2017-05-08 PROCEDURE — 85730 THROMBOPLASTIN TIME PARTIAL: CPT

## 2017-05-08 PROCEDURE — 99285 EMERGENCY DEPT VISIT HI MDM: CPT

## 2017-05-08 PROCEDURE — 81001 URINALYSIS AUTO W/SCOPE: CPT

## 2017-05-08 PROCEDURE — 96375 TX/PRO/DX INJ NEW DRUG ADDON: CPT

## 2017-05-08 PROCEDURE — 71020: CPT

## 2017-05-08 PROCEDURE — 93005 ELECTROCARDIOGRAM TRACING: CPT

## 2017-05-08 PROCEDURE — 36415 COLL VENOUS BLD VENIPUNCTURE: CPT

## 2017-05-08 PROCEDURE — 83690 ASSAY OF LIPASE: CPT

## 2017-05-08 PROCEDURE — 84484 ASSAY OF TROPONIN QUANT: CPT

## 2017-05-08 PROCEDURE — 96376 TX/PRO/DX INJ SAME DRUG ADON: CPT

## 2017-05-08 PROCEDURE — 82553 CREATINE MB FRACTION: CPT

## 2017-05-08 PROCEDURE — 83735 ASSAY OF MAGNESIUM: CPT

## 2017-05-08 PROCEDURE — 71275 CT ANGIOGRAPHY CHEST: CPT

## 2017-05-08 PROCEDURE — 96361 HYDRATE IV INFUSION ADD-ON: CPT

## 2017-05-08 PROCEDURE — 82150 ASSAY OF AMYLASE: CPT

## 2017-05-08 PROCEDURE — 85610 PROTHROMBIN TIME: CPT

## 2017-05-08 PROCEDURE — 85379 FIBRIN DEGRADATION QUANT: CPT

## 2017-05-08 PROCEDURE — 85025 COMPLETE CBC W/AUTO DIFF WBC: CPT

## 2017-05-08 NOTE — ED
Chest Pain HPI





- General


Chief Complaint: Chest Pain


Stated Complaint: Chest Pain


Time Seen by Provider: 17 20:05


Source: patient, RN notes reviewed


Mode of arrival: wheelchair


Limitations: no limitations





- History of Present Illness


Initial Comments: 





Patient 37-year-old female presents to the emergency room for reevaluation 

chest pain.  Patient having left-sided chest pain for the past 2 weeks.  

Patient states the pain has been getting worse today.  Patient states pain is 

worse when she takes a deep breath.  Patient does state she has a history of 4 

PE's at one time in .  Patient states she has a history of factor II 

deficiency.  Patient states that she has been on Coumadin.  Patient states she 

feels like her chest is fluttering.  Patient states she's is on and off feels 

short of breath.  Patient denies headache or dizziness.  Patient denies nausea 

or vomiting.  Patient denies abdominal pain.  Patient states she has a history 

of splenectomy from spontaneous rupture in , cholecystectomy.  Patient 

states she has a family history of MI.  Patient states her father  of an MI 

at age 62.  Patient denies smoking.  Patient denies fevers or chills.  Patient 

denies recent injury or trauma to her chest area.





- Related Data


 Home Medications











 Medication  Instructions  Recorded  Confirmed


 


Cyclobenzaprine [Flexeril] 10 mg PO HS 17


 


Gabapentin 600 mg PO BID 17


 


HYDROcodone/APAP 7.5-325MG [Norco 1 tab PO TID PRN 17





7.5-325]   


 


Multivitamins, Thera [Multivitamin] 1 tab PO DAILY 17


 


Warfarin [Coumadin] 5 mg PO TUTH 17


 


Gabapentin [Neurontin] 1,200 mg PO HS 03/10/17 05/08/17


 


Warfarin [Coumadin] 7.5 mg PO SUMOWEFRSA 17











 Allergies











Allergy/AdvReac Type Severity Reaction Status Date / Time


 


morphine AdvReac Severe Became Verified 17 21:03





   Unresponsive  














Review of Systems


ROS Statement: 


Those systems with pertinent positive or pertinent negative responses have been 

documented in the HPI.





ROS Other: All systems not noted in ROS Statement are negative.





EKG Findings





- EKG Comments:


EKG Findings:: Normal sinus rhythm, ventricular rate 78 bpm, AZ interval 144 ms

, QRS duration 82 ms, QT//421 ms





Past Medical History


Past Medical History: Blood Disorder, Osteoarthritis (OA)


Additional Past Medical History / Comment(s): factor 2 disorder severe 

spondylosis of back


History of Any Multi-Drug Resistant Organisms: None Reported


Past Surgical History: Tonsillectomy


Additional Past Surgical History / Comment(s): splenectomy


Past Anesthesia/Blood Transfusion Reactions: No Reported Reaction


Past Psychological History: No Psychological Hx Reported


Smoking Status: Former smoker


Past Alcohol Use History: None Reported


Additional Past Alcohol Use History / Comment(s): quit 15 years ago


Past Drug Use History: None Reported





General Exam





- General Exam Comments


Initial Comments: 





Sitting in exam room, no acute distress.


Limitations: no limitations


General appearance: alert, in no apparent distress


Head exam: Present: atraumatic, normocephalic, normal inspection


Eye exam: Present: normal appearance


ENT exam: Present: normal exam


Neck exam: Present: normal inspection


Respiratory exam: Present: normal lung sounds bilaterally, chest wall 

tenderness (Reprodicable tenderness on palpating over left anterior chest wall 

inferior to the breast.).  Absent: respiratory distress


Cardiovascular Exam: Present: normal rhythm, tachycardia, normal heart sounds


GI/Abdominal exam: Present: soft, normal bowel sounds.  Absent: distended, 

tenderness, guarding, rebound, rigid


Extremities exam: Present: normal inspection


Back exam: Present: normal inspection


Neurological exam: Present: alert, oriented X3, CN II-XII intact, normal gait


Psychiatric exam: Present: normal affect, normal mood


Skin exam: Present: warm, dry, intact, normal color.  Absent: rash





Course


 Vital Signs











  17





  19:45 23:00 01:00


 


Temperature 98.9 F  97.8 F


 


Pulse Rate 87 70 75


 


Respiratory 20 18 18





Rate   


 


Blood Pressure 167/72 114/56 128/78


 


O2 Sat by Pulse 98 100 97





Oximetry   














Chest Pain [facility]





- [facility]





Patient is a 37-year-old female presents to the emergency room for evaluation 

chest pain.  Patient states pain is worse with movement and with pressing over 

the area.  Labs showed no concerning findings.  Chest x-ray shows no concerning 

findings.  Case discussed with Dr. Eli.  Dr. Eli discussed case with 

primary care provider, Dr. Kimbrough who advised that patient follow up tomorrow 

morning for reevaluation.  Chest CT showed no acute findings.  Results 

discussed with patient.  Patient states she understands everything that was 

discussed with her.  Return parameters discussed.





Disposition


Clinical Impression: 


 Costochondral chest pain





Disposition: HOME SELF-CARE


Condition: Good


Instructions:  Chest Pain (ED), Costochondritis (ED)


Additional Instructions: 


Please follow-up with primary care provider tomorrow morning. If any new 

symptom arises or symptoms worsen, return to ER as soon as possible.  


Referrals: 


Diogenes Kimbrough Jr,  [Primary Care Provider] - 1-2 days


Time of Disposition: 00:30

## 2017-05-08 NOTE — XR
EXAMINATION TYPE: XR chest 2V

 

DATE OF EXAM: 5/8/2017 9:28 PM

 

COMPARISON: NONE

 

HISTORY: Chest pain

 

TECHNIQUE:  Frontal and lateral views of the chest are obtained.

 

FINDINGS:  Heart and mediastinum are normal. Lungs are clear. Diaphragm is normal. There are no hilar
 masses. Bony thorax is intact.

 

IMPRESSION:  Normal chest

## 2017-05-09 VITALS — HEART RATE: 75 BPM | SYSTOLIC BLOOD PRESSURE: 128 MMHG | DIASTOLIC BLOOD PRESSURE: 78 MMHG | TEMPERATURE: 97.8 F

## 2017-05-09 NOTE — CT
EXAM:

  CT Angiography Chest With Intravenous Contrast.

 

CLINICAL HISTORY:

  Pain.

 

TECHNIQUE:

  Axial computed tomographic angiography images of the chest with 

intravenous contrast using pulmonary embolism protocol.  MIP 

reconstructed images were created and reviewed.  Coronal and sagittal 

reformatted images were created and reviewed.

 

DOSE INFORMATION: 

  CTDI is 10.40, 98.00, 21.00 mGy and DLP is 813.10 mGy-cm.  This CT exam 

was performed using one or more of the following dose reduction 

techniques: automated exposure control, adjustment of the mA and/or kV 

according to patient size, and/or use of iterative reconstruction 

technique.

  

CONTRAST:

  70 mL of Omnipaque 350 administered intravenously.

 

COMPARISON:

  No relevant prior studies available.

 

FINDINGS:

  Pulmonary arteries:  No evidence of pulmonary embolism.  Evaluation for 

small peripheral pulmonary emboli is limited due to suboptimal 

opacification of peripheral pulmonary arteries.

  Aorta:  No thoracic aortic aneurysm or dissection.

  Lungs:  Mild groundglass opacities in the dependent portions of the 

lower lobes and right lung base, probably atelectasis.  Lungs otherwise 

clear.

  Pleural space:  No pleural effusion or pneumothorax.

  Heart:  Normal cardiac size.  No pericardial effusion.  No evidence of 

RV dysfunction.

  Bones/joints:  No acute fracture.  No dislocation.

  Soft tissues:  No significant soft tissue abnormality.

  Lymph nodes:  Unremarkable.  No enlarged lymph nodes.

  Visualized upper abdomen:   Postsurgical changes in the visualized 

upper abdomen with evidence of cholecystectomy and splenectomy.

 

IMPRESSION:     

1.  No evidence of pulmonary embolism or other acute chest abnormality to 

account for pain.  Note that evaluation for small peripheral pulmonary 

emboli is limited due to suboptimal opacification of peripheral pulmonary 

arteries.  

2.  Mild atelectasis in the lungs.  No focal consolidation, pleural 

effusion or pneumothorax.

3.  Postsurgical changes in the visualized upper abdomen with evidence of 

cholecystectomy and splenectomy.

## 2017-06-08 ENCOUNTER — HOSPITAL ENCOUNTER (OUTPATIENT)
Dept: HOSPITAL 47 - SLEEP | Age: 37
Discharge: HOME | End: 2017-06-08
Payer: COMMERCIAL

## 2017-06-08 DIAGNOSIS — G47.10: Primary | ICD-10-CM

## 2017-06-08 DIAGNOSIS — Z90.81: ICD-10-CM

## 2017-06-08 DIAGNOSIS — Z90.89: ICD-10-CM

## 2017-06-08 DIAGNOSIS — D73.5: ICD-10-CM

## 2017-06-08 DIAGNOSIS — Z79.899: ICD-10-CM

## 2017-06-08 DIAGNOSIS — D68.2: ICD-10-CM

## 2017-06-08 DIAGNOSIS — E66.9: ICD-10-CM

## 2017-06-08 DIAGNOSIS — Z79.01: ICD-10-CM

## 2017-06-08 DIAGNOSIS — Z90.49: ICD-10-CM

## 2017-06-08 DIAGNOSIS — M43.10: ICD-10-CM

## 2017-06-08 PROCEDURE — 99211 OFF/OP EST MAY X REQ PHY/QHP: CPT

## 2017-06-08 NOTE — CONS
DATE OF CONSULTATION:  06/08/2017



This patient is a 37-year-old lady who has been evaluated in the sleep 

center for possible obstructive sleep apnea-hypopnea syndrome.  



HISTORY OF PRESENT ILLNESS/SLEEP-WAKE EVALUATION: Patient's normal 

sleep schedule on working days is from around 11 p.m. to 5:45 a.m. and 

on weekends from around 12 a.m. until 7 a.m. No problems with falling 

asleep. She has a TV set in the bedroom. Usually she sleeps on the 

side. She has snoring, wakes up from sleep 2 times. In the morning she 

wakes up tired, has difficulties paying attention, falling asleep 

during the day. She has problems with memory, concentration, 

irritability. [place] Sleepiness Scale is significantly increased to 

14.  



Past medical history is positive for: 

1. Factor II coagulation factor deficiency. 

2. Spondylolisthesis, stage II. 

3. Possible history of spina bifida. 



PAST SURGICAL HISTORY: 

1. Tonsillectomy. 

2. Splenectomy for spontaneous rupture of spleen.

3. Cholecystectomy. 



MEDICATIONS:  

1. Norco. 

2. Gabapentin. 

3. Warfarin. 

4. Flexeril.



SOCIAL HISTORY: Negative for smoking. Alcohol consumption rarely. 



REVIEW OF SYSTEMS: Awakenings from sleep. Tiredness and sleepiness 

during the day. Numbness in left leg. Back pain. No fevers. No double 

vision. No recent chest pain. No shortness of breath. No abdominal 

pain. No bleeding episodes. No blood in urine. No seizure episodes.  



FAMILY HISTORY: Hypertension, heart problems, epilepsy, stroke, sleep 

apnea, snoring, diabetes.  



PHYSICAL EXAMINATION: Pleasant  lady without distress. 

VITAL SIGNS: /76, HR 94, RR 16. Height 5 feet 6 inches. Weight 

289. BMI 46.6. Neck 16 inches in circumference. Temperature 99.1. 

Oxygen saturation at room air 96%.  

HEENT: PERRLA, EOMI. Evaluation of oropharynx showed tongue protrudes 

midline; moderately low position of soft palate.  

NECK: Supple. No JVD. Thyroid is not palpable. 

LUNGS: Clear to percussion and to auscultation. Good air exchange. No 

wheezing or rhonchi.  

HEART: Very slight systolic murmur on pulmonary artery.

ABDOMEN: Obese. 

EXTREMITIES: Numbness on the lateral part of left leg. 

CNS: Awake, alert, and oriented x3. Cranial nerves 2 to 7 intact. 

There is no fasciculation or atrophy noted.  



IMPRESSION: 

1. Snoring, awakenings from sleep, significant excessive daytime 

sleepiness, [place] Sleepiness Scale 14, moderately low position of 

soft palate, obesity; possible obstructive sleep apnea-hypopnea 

syndrome.  

2. Obesity; body mass index 46.6. 

3. Spondylolisthesis, stage II, with numbness of left leg, pain in the 

back.  

4. Possible history of spina bifida. 

5. Status post tonsillectomy. 

6. Status post spontaneous spleen rupture. 

7. Status post splenectomy. 

8. Status post cholecystectomy. 

9. Factor II deficiency, on treatment with warfarin.



PLAN: 

1. Polysomnography for evaluation of patient's breathing during sleep. 

2. CPAP/BiPAP titration if sleep study confirms obstructive sleep 

apnea-hypopnea syndrome.  

3. Preferable position during sleep on the side. 

4. No driving if patient feels any sleepiness. Patient is aware of 

civil and criminal liability for unsafe driving.  

5. I will see patient for follow-up visit to explain results of the 

testing and following plan.  



Sincerely,







Sudarshan Fowler MD, PhD, FAASM.

Diplomat of American Board of Sleep Medicine,

Sleep Medicine Board by American Board of Medical Specialities

American Board of Internal Medicine

Medical Director of [facility] [place]

## 2017-07-06 ENCOUNTER — HOSPITAL ENCOUNTER (EMERGENCY)
Dept: HOSPITAL 47 - EC | Age: 37
Discharge: HOME | End: 2017-07-06
Payer: COMMERCIAL

## 2017-07-06 VITALS — HEART RATE: 79 BPM | DIASTOLIC BLOOD PRESSURE: 89 MMHG | SYSTOLIC BLOOD PRESSURE: 131 MMHG | TEMPERATURE: 98.2 F

## 2017-07-06 VITALS — RESPIRATION RATE: 20 BRPM

## 2017-07-06 DIAGNOSIS — R07.9: Primary | ICD-10-CM

## 2017-07-06 DIAGNOSIS — Z79.01: ICD-10-CM

## 2017-07-06 DIAGNOSIS — Z88.5: ICD-10-CM

## 2017-07-06 DIAGNOSIS — Z79.899: ICD-10-CM

## 2017-07-06 DIAGNOSIS — Z87.891: ICD-10-CM

## 2017-07-06 DIAGNOSIS — Z79.891: ICD-10-CM

## 2017-07-06 LAB
ALP SERPL-CCNC: 60 U/L (ref 38–126)
ALT SERPL-CCNC: 24 U/L (ref 9–52)
ANION GAP SERPL CALC-SCNC: 8 MMOL/L
APTT BLD: 32.4 SEC (ref 22–30)
AST SERPL-CCNC: 21 U/L (ref 14–36)
BASOPHILS # BLD AUTO: 0.1 K/UL (ref 0–0.2)
BASOPHILS NFR BLD AUTO: 1 %
BUN SERPL-SCNC: 17 MG/DL (ref 7–17)
CALCIUM SPEC-MCNC: 9.1 MG/DL (ref 8.4–10.2)
CH: 29.1
CHCM: 33.4
CHLORIDE SERPL-SCNC: 107 MMOL/L (ref 98–107)
CK SERPL-CCNC: 76 U/L (ref 30–135)
CO2 SERPL-SCNC: 24 MMOL/L (ref 22–30)
EOSINOPHIL # BLD AUTO: 0.4 K/UL (ref 0–0.7)
EOSINOPHIL NFR BLD AUTO: 3 %
ERYTHROCYTE [DISTWIDTH] IN BLOOD BY AUTOMATED COUNT: 4.7 M/UL (ref 3.8–5.4)
ERYTHROCYTE [DISTWIDTH] IN BLOOD: 13.5 % (ref 11.5–15.5)
GLUCOSE SERPL-MCNC: 99 MG/DL (ref 74–99)
HCT VFR BLD AUTO: 41.1 % (ref 34–46)
HDW: 2.62
HGB BLD-MCNC: 14.3 GM/DL (ref 11.4–16)
INR PPP: 3.5 (ref ?–1.1)
LUC NFR BLD AUTO: 2 %
LYMPHOCYTES # SPEC AUTO: 4.5 K/UL (ref 1–4.8)
LYMPHOCYTES NFR SPEC AUTO: 36 %
MAGNESIUM SPEC-SCNC: 1.8 MG/DL (ref 1.6–2.3)
MCH RBC QN AUTO: 30.3 PG (ref 25–35)
MCHC RBC AUTO-ENTMCNC: 34.7 G/DL (ref 31–37)
MCV RBC AUTO: 87.5 FL (ref 80–100)
MONOCYTES # BLD AUTO: 0.9 K/UL (ref 0–1)
MONOCYTES NFR BLD AUTO: 7 %
NEUTROPHILS # BLD AUTO: 6.3 K/UL (ref 1.3–7.7)
NEUTROPHILS NFR BLD AUTO: 51 %
NON-AFRICAN AMERICAN GFR(MDRD): >60
POTASSIUM SERPL-SCNC: 4.2 MMOL/L (ref 3.5–5.1)
PROT SERPL-MCNC: 7 G/DL (ref 6.3–8.2)
PT BLD: 33.9 SEC (ref 9–12)
SODIUM SERPL-SCNC: 139 MMOL/L (ref 137–145)
TROPONIN I SERPL-MCNC: <0.012 NG/ML (ref 0–0.03)
WBC # BLD AUTO: 0.19 10*3/UL
WBC # BLD AUTO: 12.4 K/UL (ref 3.8–10.6)
WBC (PEROX): 11.96

## 2017-07-06 PROCEDURE — 82550 ASSAY OF CK (CPK): CPT

## 2017-07-06 PROCEDURE — 84484 ASSAY OF TROPONIN QUANT: CPT

## 2017-07-06 PROCEDURE — 36415 COLL VENOUS BLD VENIPUNCTURE: CPT

## 2017-07-06 PROCEDURE — 80053 COMPREHEN METABOLIC PANEL: CPT

## 2017-07-06 PROCEDURE — 85610 PROTHROMBIN TIME: CPT

## 2017-07-06 PROCEDURE — 71275 CT ANGIOGRAPHY CHEST: CPT

## 2017-07-06 PROCEDURE — 82553 CREATINE MB FRACTION: CPT

## 2017-07-06 PROCEDURE — 99285 EMERGENCY DEPT VISIT HI MDM: CPT

## 2017-07-06 PROCEDURE — 85730 THROMBOPLASTIN TIME PARTIAL: CPT

## 2017-07-06 PROCEDURE — 93005 ELECTROCARDIOGRAM TRACING: CPT

## 2017-07-06 PROCEDURE — 85025 COMPLETE CBC W/AUTO DIFF WBC: CPT

## 2017-07-06 PROCEDURE — 83735 ASSAY OF MAGNESIUM: CPT

## 2017-07-06 NOTE — CT
EXAMINATION TYPE: CT angio chest

 

DATE OF EXAM: 7/6/2017 12:17 PM

 

COMPARISON: Previous study dated 5/8/2017. 

 

HISTORY: SOB, chest pain

 

CT DLP: 566 mGycm

Automated exposure control for dose reduction was used.

 

CONTRAST: 

CTA scan of the thorax is performed with IV Contrast, patient injected with 100 mL of Omnipaque 350, 
pulmonary embolism protocol. .  

 

FINDINGS: There is minimal dependent atelectasis within the lung bases. The lungs are otherwise clear
.

 

There is no significant axillary, internal mammary, mediastinal or hilar adenopathy.

 

There is no

 

The aorta is normal in caliber without evidence of dissection.

 

There is no evidence of pleural or pericardial fluid. The heart is not enlarged.

 

Within the abdomen, the gallbladder is been removed. There is been a previous splenectomy. Visualized
 portions of the upper abdomen are otherwise unremarkable.

 

No bony lesion is seen.

 

IMPRESSION: 

1. THIS EXAMINATION IS NEGATIVE FOR PULMONARY EMBOLUS.

2. STATUS POST CHOLECYSTECTOMY AND SPLENECTOMY.

## 2017-07-06 NOTE — ED
General Adult HPI





- General


Chief complaint: Shortness of Breath


Stated complaint: SOB


Time Seen by Provider: 07/06/17 10:08


Source: patient, RN notes reviewed


Mode of arrival: ambulatory


Limitations: no limitations





- History of Present Illness


Initial comments: 





Patient is a pleasant 37-year-old female presenting to the emergency department 

complaining of chest discomfort.  Onset of symptoms was 4-5 days ago.  Patient 

has pressure in her chest and sharp discomfort up in her neck.  Patient does 

have associated dyspnea.  Patient was nauseated earlier.  Discomfort has been 

waxing and waning.  Discomfort is mild at this time.  Patient does have a 

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  Patient is on Coumadin.





- Related Data


 Home Medications











 Medication  Instructions  Recorded  Confirmed


 


Cyclobenzaprine [Flexeril] 10 mg PO HS 02/22/17 07/06/17


 


Gabapentin 600 mg PO BID 02/22/17 07/06/17


 


HYDROcodone/APAP 7.5-325MG [Norco 1 tab PO TID 02/22/17 07/06/17





7.5-325]   


 


Multivitamins, Thera [Multivitamin] 1 tab PO HS 02/22/17 07/06/17


 


Warfarin [Coumadin] 5 mg PO TUTH 03/02/17 07/06/17


 


Gabapentin [Neurontin] 1,200 mg PO HS 03/10/17 07/06/17


 


Warfarin [Coumadin] 7.5 mg PO SUMOWEFRSA 05/08/17 07/06/17











 Allergies











Allergy/AdvReac Type Severity Reaction Status Date / Time


 


morphine AdvReac Severe Became Verified 07/06/17 10:31





   Unresponsive  














Review of Systems


ROS Statement: 


Those systems with pertinent positive or pertinent negative responses have been 

documented in the HPI.





ROS Other: All systems not noted in ROS Statement are negative.


Constitutional: Denies: fever


Eyes: Denies: eye pain


ENT: Denies: ear pain


Respiratory: Reports: dyspnea


Cardiovascular: Reports: chest pain


Endocrine: Denies: fatigue


Gastrointestinal: Denies: abdominal pain


Genitourinary: Denies: dysuria


Musculoskeletal: Denies: back pain


Skin: Denies: rash


Neurological: Denies: weakness





Past Medical History


Past Medical History: Blood Disorder, Osteoarthritis (OA)


Additional Past Medical History / Comment(s): factor 2 disorder severe 

spondylosis of back


History of Any Multi-Drug Resistant Organisms: None Reported


Past Surgical History: Tonsillectomy


Additional Past Surgical History / Comment(s): splenectomy


Past Anesthesia/Blood Transfusion Reactions: No Reported Reaction


Past Psychological History: No Psychological Hx Reported


Smoking Status: Former smoker


Past Alcohol Use History: None Reported


Past Drug Use History: None Reported





General Exam


Limitations: no limitations


General appearance: alert, in no apparent distress


Head exam: Present: atraumatic


Eye exam: Present: normal appearance, PERRL


ENT exam: Present: normal oropharynx


Neck exam: Present: normal inspection


Respiratory exam: Present: normal lung sounds bilaterally.  Absent: chest wall 

tenderness


Cardiovascular Exam: Present: regular rate, normal rhythm


  ** Expanded


Peripheral pulses: 2+: Radial (R), Radial (L), Dorsalis Pedis (R), Dorsalis 

Pedis (L)


GI/Abdominal exam: Present: soft.  Absent: tenderness


Extremities exam: Present: normal inspection.  Absent: pedal edema, calf 

tenderness


Neurological exam: Present: alert


Psychiatric exam: Present: normal affect, normal mood


Skin exam: Present: normal color





Course


 Vital Signs











  07/06/17 07/06/17 07/06/17





  10:01 10:04 10:31


 


Temperature 98.1 F  


 


Pulse Rate 80 78 


 


Respiratory 17 18 18





Rate   


 


Blood Pressure 172/109 163/80 


 


O2 Sat by Pulse 98 98 





Oximetry   














  07/06/17 07/06/17 07/06/17





  10:36 10:50 11:04


 


Temperature   


 


Pulse Rate 77 78 66


 


Respiratory 18 18 18





Rate   


 


Blood Pressure 156/81 155/80 117/56


 


O2 Sat by Pulse 98 98 100





Oximetry   














  07/06/17 07/06/17





  12:00 12:51


 


Temperature  


 


Pulse Rate 65 69


 


Respiratory 18 20





Rate  


 


Blood Pressure 135/80 137/78


 


O2 Sat by Pulse 99 98





Oximetry  














EKG Findings





- EKG Comments:


EKG Findings:: Normal sinus rhythm 69.  Normal intervals.  Normal axis.  Normal 

QRS.  Normal ST-T.





Medical Decision Making





- Medical Decision Making





Patient reexamined and resting comfortably in bed.  Case discussed with Dr. Arzate who would like patient discharged for follow-up tomorrow.  Patient 

updated on results and plan.





- Lab Data


Result diagrams: 


 07/06/17 10:25





 07/06/17 10:25


 Lab Results











  07/06/17 07/06/17 07/06/17 Range/Units





  10:25 10:25 10:25 


 


WBC   12.4 H   (3.8-10.6)  k/uL


 


RBC   4.70   (3.80-5.40)  m/uL


 


Hgb   14.3   (11.4-16.0)  gm/dL


 


Hct   41.1   (34.0-46.0)  %


 


MCV   87.5   (80.0-100.0)  fL


 


MCH   30.3   (25.0-35.0)  pg


 


MCHC   34.7   (31.0-37.0)  g/dL


 


RDW   13.5   (11.5-15.5)  %


 


Plt Count   553 H   (150-450)  k/uL


 


Neutrophils %   51   %


 


Lymphocytes %   36   %


 


Monocytes %   7   %


 


Eosinophils %   3   %


 


Basophils %   1   %


 


Neutrophils #   6.3   (1.3-7.7)  k/uL


 


Lymphocytes #   4.5   (1.0-4.8)  k/uL


 


Monocytes #   0.9   (0-1.0)  k/uL


 


Eosinophils #   0.4   (0-0.7)  k/uL


 


Basophils #   0.1   (0-0.2)  k/uL


 


PT     (9.0-12.0)  sec


 


INR     (<1.1)  


 


APTT     (22.0-30.0)  sec


 


Sodium    139  (137-145)  mmol/L


 


Potassium    4.2  (3.5-5.1)  mmol/L


 


Chloride    107  ()  mmol/L


 


Carbon Dioxide    24  (22-30)  mmol/L


 


Anion Gap    8  mmol/L


 


BUN    17  (7-17)  mg/dL


 


Creatinine    0.67  (0.52-1.04)  mg/dL


 


Est GFR (MDRD) Af Amer    >60  (>60 ml/min/1.73 sqM)  


 


Est GFR (MDRD) Non-Af    >60  (>60 ml/min/1.73 sqM)  


 


Glucose    99  (74-99)  mg/dL


 


Calcium    9.1  (8.4-10.2)  mg/dL


 


Magnesium    1.8  (1.6-2.3)  mg/dL


 


Total Bilirubin    0.4  (0.2-1.3)  mg/dL


 


AST    21  (14-36)  U/L


 


ALT    24  (9-52)  U/L


 


Alkaline Phosphatase    60  ()  U/L


 


Total Creatine Kinase  76    ()  U/L


 


CK-MB (CK-2)  0.4    (0.0-2.4)  ng/mL


 


CK-MB (CK-2) Rel Index  0.5    


 


Troponin I  <0.012    (0.000-0.034)  ng/mL


 


Total Protein    7.0  (6.3-8.2)  g/dL


 


Albumin    3.6  (3.5-5.0)  g/dL














  07/06/17 Range/Units





  10:25 


 


WBC   (3.8-10.6)  k/uL


 


RBC   (3.80-5.40)  m/uL


 


Hgb   (11.4-16.0)  gm/dL


 


Hct   (34.0-46.0)  %


 


MCV   (80.0-100.0)  fL


 


MCH   (25.0-35.0)  pg


 


MCHC   (31.0-37.0)  g/dL


 


RDW   (11.5-15.5)  %


 


Plt Count   (150-450)  k/uL


 


Neutrophils %   %


 


Lymphocytes %   %


 


Monocytes %   %


 


Eosinophils %   %


 


Basophils %   %


 


Neutrophils #   (1.3-7.7)  k/uL


 


Lymphocytes #   (1.0-4.8)  k/uL


 


Monocytes #   (0-1.0)  k/uL


 


Eosinophils #   (0-0.7)  k/uL


 


Basophils #   (0-0.2)  k/uL


 


PT  33.9 H  (9.0-12.0)  sec


 


INR  3.5  (<1.1)  


 


APTT  32.4 H  (22.0-30.0)  sec


 


Sodium   (137-145)  mmol/L


 


Potassium   (3.5-5.1)  mmol/L


 


Chloride   ()  mmol/L


 


Carbon Dioxide   (22-30)  mmol/L


 


Anion Gap   mmol/L


 


BUN   (7-17)  mg/dL


 


Creatinine   (0.52-1.04)  mg/dL


 


Est GFR (MDRD) Af Amer   (>60 ml/min/1.73 sqM)  


 


Est GFR (MDRD) Non-Af   (>60 ml/min/1.73 sqM)  


 


Glucose   (74-99)  mg/dL


 


Calcium   (8.4-10.2)  mg/dL


 


Magnesium   (1.6-2.3)  mg/dL


 


Total Bilirubin   (0.2-1.3)  mg/dL


 


AST   (14-36)  U/L


 


ALT   (9-52)  U/L


 


Alkaline Phosphatase   ()  U/L


 


Total Creatine Kinase   ()  U/L


 


CK-MB (CK-2)   (0.0-2.4)  ng/mL


 


CK-MB (CK-2) Rel Index   


 


Troponin I   (0.000-0.034)  ng/mL


 


Total Protein   (6.3-8.2)  g/dL


 


Albumin   (3.5-5.0)  g/dL














- Radiology Data


Radiology results: image reviewed (Computed tomography scan of the chest 

negative for pulmonary embolism.)





Disposition


Clinical Impression: 


 Chest pain





Disposition: HOME SELF-CARE


Condition: Stable


Instructions:  Chest Pain (ED)


Additional Instructions: 


Please follow-up tomorrow morning with Dr. Arzate.  Return for increased pain, 

difficulty breathing, change or worsening symptoms or other concerns.


Referrals: 


Diogenes Kimbrough Jr, DO [Primary Care Provider] - 1-2 days


Time of Disposition: 13:37

## 2017-08-07 ENCOUNTER — HOSPITAL ENCOUNTER (OUTPATIENT)
Dept: HOSPITAL 47 - BARWHC3 | Age: 37
Discharge: HOME | End: 2017-08-07
Payer: COMMERCIAL

## 2017-08-07 VITALS — BODY MASS INDEX: 45.8 KG/M2

## 2017-08-07 DIAGNOSIS — E66.01: Primary | ICD-10-CM

## 2017-08-07 PROCEDURE — 97804 MEDICAL NUTRITION GROUP: CPT

## 2017-08-23 ENCOUNTER — HOSPITAL ENCOUNTER (OUTPATIENT)
Dept: HOSPITAL 47 - LABPAT | Age: 37
Discharge: HOME | End: 2017-08-23
Payer: COMMERCIAL

## 2017-08-23 ENCOUNTER — HOSPITAL ENCOUNTER (OUTPATIENT)
Age: 37
Discharge: HOME | End: 2017-08-23
Payer: COMMERCIAL

## 2017-08-23 DIAGNOSIS — Z01.812: Primary | ICD-10-CM

## 2017-08-23 LAB
ALP SERPL-CCNC: 69 U/L (ref 38–126)
ALT SERPL-CCNC: 55 U/L (ref 9–52)
ANION GAP SERPL CALC-SCNC: 8 MMOL/L
AST SERPL-CCNC: 32 U/L (ref 14–36)
BASOPHILS # BLD AUTO: 0.2 K/UL (ref 0–0.2)
BASOPHILS NFR BLD AUTO: 1 %
BUN SERPL-SCNC: 15 MG/DL (ref 7–17)
CALCIUM SPEC-MCNC: 9.7 MG/DL (ref 8.4–10.2)
CH: 30
CHCM: 32.9
CHLORIDE SERPL-SCNC: 103 MMOL/L (ref 98–107)
CO2 SERPL-SCNC: 26 MMOL/L (ref 22–30)
EOSINOPHIL # BLD AUTO: 0.4 K/UL (ref 0–0.7)
EOSINOPHIL NFR BLD AUTO: 3 %
ERYTHROCYTE [DISTWIDTH] IN BLOOD BY AUTOMATED COUNT: 5.07 M/UL (ref 3.8–5.4)
ERYTHROCYTE [DISTWIDTH] IN BLOOD: 14 % (ref 11.5–15.5)
GLUCOSE SERPL-MCNC: 86 MG/DL (ref 74–99)
HCT VFR BLD AUTO: 46.6 % (ref 34–46)
HDW: 2.57
HGB BLD-MCNC: 14.8 GM/DL (ref 11.4–16)
LUC NFR BLD AUTO: 2 %
LYMPHOCYTES # SPEC AUTO: 5.9 K/UL (ref 1–4.8)
LYMPHOCYTES NFR SPEC AUTO: 38 %
MCH RBC QN AUTO: 29.1 PG (ref 25–35)
MCHC RBC AUTO-ENTMCNC: 31.7 G/DL (ref 31–37)
MCV RBC AUTO: 91.9 FL (ref 80–100)
MONOCYTES # BLD AUTO: 1.3 K/UL (ref 0–1)
MONOCYTES NFR BLD AUTO: 8 %
NEUTROPHILS # BLD AUTO: 7.4 K/UL (ref 1.3–7.7)
NEUTROPHILS NFR BLD AUTO: 48 %
NON-AFRICAN AMERICAN GFR(MDRD): >60
POTASSIUM SERPL-SCNC: 4.4 MMOL/L (ref 3.5–5.1)
PROT SERPL-MCNC: 7.3 G/DL (ref 6.3–8.2)
SODIUM SERPL-SCNC: 137 MMOL/L (ref 137–145)
WBC # BLD AUTO: 0.37 10*3/UL
WBC # BLD AUTO: 15.4 K/UL (ref 3.8–10.6)
WBC (PEROX): 15.12

## 2017-08-23 PROCEDURE — 80053 COMPREHEN METABOLIC PANEL: CPT

## 2017-08-23 PROCEDURE — 85025 COMPLETE CBC W/AUTO DIFF WBC: CPT

## 2017-08-23 PROCEDURE — 99211 OFF/OP EST MAY X REQ PHY/QHP: CPT

## 2017-09-17 NOTE — P.GSHP
History of Present Illness


H&P Date: 09/18/17











DATE OF SERVICE: 09/18/2017 





CHIEF COMPLAINT:  Morbid obesity. 





HISTORY OF PRESENT ILLNESS: 


Marlin Zhong is a very pleasant 37-year-old female who initially 


[facility]. She reports 


previous history of pulmonary embolism including Factor II disorder as 


she is on Coumadin. At her height of 5 feet 7-1/4 inches she comes in 


today weighing 292 pounds. Body mass index has now increased is 45.9. 


She is over 128 pounds overweight.  She now presents for sleeve gastrectomy.





PAST MEDICAL HISTORY: 


1. 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 


2. Factor II disorder. 


3. Previous history of pulmonary embolism to the lungs. 


4. Osteoarthritis of the lower back. 


5. Osteoarthritis of the left hip. 


6. Sciatica of the left leg. 


7. History of foot drop. 


8. Obstructive sleep apnea. 


9. Morbid obesity due to excess calories. 


10. Chronic pain syndrome. 


11. History of spondylolisthesis. 





PAST SURGICAL HISTORY: 


1. Tonsillectomy. 


2. Splenectomy. 


3. Cholecystectomy. 





MEDICATIONS:


1. Gabapentin. 


2. Norco 7.5.


3. Flexeril. 


4. Adipex.


5. Multivitamin. 


6. Coumadin. 





ALLERGIES: MORPHINE. 





SOCIAL HISTORY: Former tobacco user. 





FAMILY HISTORY: No reports of esophageal or stomach cancer. 





REVIEW OF SYSTEMS:


CONSTITUTIONAL: Ideal body weight of 178 pounds. Highest weight was 


315 pounds. At her height of 5 feet 7-1/4 inches she comes in 


today weighing 286 pounds. She has actually gained 2 pounds in the 


past 2 months. Body mass index has now increased from 44.4 up to 44.6. 


She is 128 pounds overweight. 


HEENT: No troubles with vision, hearing. Denies dysphagia. 


ENDOCRINE: No reports of diabetes or thyroid disorders. 


RESPIRATORY: Denies any dyspnea on exertion. She has previous history 


of pulmonary embolism.  


CARDIOVASCULAR: No reports of palpitations or heart attack. 


GASTROINTESTINAL: No reports of food allergies. No reports of 


diarrhea; however, she has constipation.  


MUSCULOSKELETAL: Has spondylosis of the lower back. Chronic lower back 


pain. Also reports osteoarthritis of the hip and sciatica.  


NEURO: No reports of stroke or seizure disorder. Has chronic pain. 


PSYCH: No reports of depression or suicidal ideation.  


HEMATOLOGIC: History of hypercoagulable disorder. Previous history of 


pulmonary embolism. She is on chronic Coumadin therapy.  





PHYSICAL EXAM:


VITAL SIGNS: 5 foot 7-1/4 frame, 286 pounds. Body mass index 44.3.  


GENERAL: Well-developed, pleasant female in no acute distress. 


HEENT: No scleral icterus. Extraocular movements grossly intact. Moist 


buccal mucosa.  


NECK: Supple without lymphadenopathy. 


CHEST: Nonlabored respirations with equal breath excursions. 


CARDIOVASCULAR: Regular rate and rhythm. 


ABDOMEN: Soft, nontender, nondistended. 


MUSCULOSKELETAL: No clubbing, cyanosis, or edema. 


NEURO: No focal or lateralizing signs. 


PSYCH: Appropriate affect. Alert and oriented to person, place, and 


time.  





Pathology report demonstrated no evidence of H. pylori bacteria. 


Chronic gastritis was identified.  








EKG demonstrated normal sinus rhythm.  





ASSESSMENT: 


1. Morbid obesity due to excess calories. 


2. Body mass index increased to 44.6. 


3. Medical supervised weight loss. 


4. Dietary surveillance and counseling. 


5. Personal history of hypercoagulable disorder factor II. 


6. Previous history of pulmonary embolism. 


7. Spondylosis of the lower spine. 


8. Osteoarthritis of the lower back. 


9. Osteoarthritis of the left hip. 


10. Previous history of foot drop. 


11. Obstructive sleep apnea. 


12. Asplenia. 


13. Hypertension. 


14. 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 


15. 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 


16. Vitamin D deficiency. 


17. Chronic gastritis. 


18. Chronic anticoagulation. 





PLAN: 


1.  A second generation bariatric consent form was reviewed in detail.  

Benefits and risks including bleeding, infection, leaks, nutritional 

deficiencies were reviewed in detail.  She has elected for sleeve gastrectomy.


2.  Antibiotic prophylaxis was reviewed.


3.  DVT prophylaxis reviewed.


4. She is at increased risk for bleeding as she is on chronic Coumadin 


therapy. Additionally with her factor II disorder, additional 


recommendations per her hematologist will be advisable.  














Past Medical History


Past Medical History: Blood Disorder, Musculoskeletal Disorder, Osteoarthritis (

OA), Pulmonary Embolus (PE), Sleep Apnea/CPAP/BIPAP


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 

2003, severe spondylosis of back, just got CPAP


History of Any Multi-Drug Resistant Organisms: None Reported


Past Surgical History: Tonsillectomy


Additional Past Surgical History / Comment(s): splenectomy, recent EGD


Past Anesthesia/Blood Transfusion Reactions: Motion Sickness, Postoperative 

Nausea & Vomiting (PONV)


Smoking Status: Former smoker





- Past Family History


  ** Mother


Family Medical History: Blood Disorder, Pulmonary Embolus


Additional Family Medical History / Comment(s): mom has factor 2 also





Medications and Allergies


 Home Medications











 Medication  Instructions  Recorded  Confirmed  Type


 


Cyclobenzaprine [Flexeril] 10 mg PO HS 02/22/17 09/11/17 History


 


Gabapentin 600 mg PO BID 02/22/17 09/11/17 History


 


HYDROcodone/APAP 7.5-325MG [Norco 1 tab PO TID 02/22/17 09/11/17 History





7.5-325]    


 


Multivitamins, Thera [Multivitamin] 1 tab PO HS 02/22/17 09/11/17 History


 


Warfarin [Coumadin] 5 mg PO TUTH 03/02/17 09/11/17 History


 


Gabapentin [Neurontin] 1,200 mg PO HS 03/10/17 09/11/17 History


 


Warfarin [Coumadin] 7.5 mg PO SUMOWEFRSA 05/08/17 09/11/17 History











 Allergies











Allergy/AdvReac Type Severity Reaction Status Date / Time


 


morphine AdvReac Severe Became Verified 09/11/17 10:04





   Unresponsive

## 2017-09-18 ENCOUNTER — HOSPITAL ENCOUNTER (INPATIENT)
Dept: HOSPITAL 47 - 2ORWHC | Age: 37
LOS: 1 days | Discharge: HOME | DRG: 620 | End: 2017-09-19
Payer: COMMERCIAL

## 2017-09-18 VITALS — BODY MASS INDEX: 45.6 KG/M2

## 2017-09-18 DIAGNOSIS — M54.32: ICD-10-CM

## 2017-09-18 DIAGNOSIS — Z90.81: ICD-10-CM

## 2017-09-18 DIAGNOSIS — M47.9: ICD-10-CM

## 2017-09-18 DIAGNOSIS — M16.12: ICD-10-CM

## 2017-09-18 DIAGNOSIS — G47.33: ICD-10-CM

## 2017-09-18 DIAGNOSIS — Z87.891: ICD-10-CM

## 2017-09-18 DIAGNOSIS — K43.2: ICD-10-CM

## 2017-09-18 DIAGNOSIS — D68.59: ICD-10-CM

## 2017-09-18 DIAGNOSIS — I10: ICD-10-CM

## 2017-09-18 DIAGNOSIS — E66.01: Primary | ICD-10-CM

## 2017-09-18 DIAGNOSIS — D72.829: ICD-10-CM

## 2017-09-18 DIAGNOSIS — G89.4: ICD-10-CM

## 2017-09-18 DIAGNOSIS — G43.909: ICD-10-CM

## 2017-09-18 DIAGNOSIS — Z79.01: ICD-10-CM

## 2017-09-18 DIAGNOSIS — Q89.01: ICD-10-CM

## 2017-09-18 DIAGNOSIS — K66.0: ICD-10-CM

## 2017-09-18 DIAGNOSIS — D75.89: ICD-10-CM

## 2017-09-18 DIAGNOSIS — E55.9: ICD-10-CM

## 2017-09-18 DIAGNOSIS — K29.50: ICD-10-CM

## 2017-09-18 DIAGNOSIS — Z86.711: ICD-10-CM

## 2017-09-18 DIAGNOSIS — Z79.899: ICD-10-CM

## 2017-09-18 LAB
INR PPP: 1 (ref ?–1.2)
PT BLD: 10.1 SEC (ref 9–12)

## 2017-09-18 PROCEDURE — 81025 URINE PREGNANCY TEST: CPT

## 2017-09-18 PROCEDURE — 94762 N-INVAS EAR/PLS OXIMTRY CONT: CPT

## 2017-09-18 PROCEDURE — 88307 TISSUE EXAM BY PATHOLOGIST: CPT

## 2017-09-18 PROCEDURE — 0DNS4ZZ: ICD-10-PCS

## 2017-09-18 PROCEDURE — 74240 X-RAY XM UPR GI TRC 1CNTRST: CPT

## 2017-09-18 PROCEDURE — 0DJ08ZZ INSPECTION OF UPPER INTESTINAL TRACT, VIA NATURAL OR ARTIFICIAL OPENING ENDOSCOPIC: ICD-10-PCS

## 2017-09-18 PROCEDURE — 84520 ASSAY OF UREA NITROGEN: CPT

## 2017-09-18 PROCEDURE — 83735 ASSAY OF MAGNESIUM: CPT

## 2017-09-18 PROCEDURE — 94640 AIRWAY INHALATION TREATMENT: CPT

## 2017-09-18 PROCEDURE — 94760 N-INVAS EAR/PLS OXIMETRY 1: CPT

## 2017-09-18 PROCEDURE — 82310 ASSAY OF CALCIUM: CPT

## 2017-09-18 PROCEDURE — 80051 ELECTROLYTE PANEL: CPT

## 2017-09-18 PROCEDURE — 84100 ASSAY OF PHOSPHORUS: CPT

## 2017-09-18 PROCEDURE — 82565 ASSAY OF CREATININE: CPT

## 2017-09-18 PROCEDURE — 85610 PROTHROMBIN TIME: CPT

## 2017-09-18 PROCEDURE — 0DNL4ZZ RELEASE TRANSVERSE COLON, PERCUTANEOUS ENDOSCOPIC APPROACH: ICD-10-PCS

## 2017-09-18 PROCEDURE — 0DB64Z3 EXCISION OF STOMACH, PERCUTANEOUS ENDOSCOPIC APPROACH, VERTICAL: ICD-10-PCS

## 2017-09-18 PROCEDURE — 85025 COMPLETE CBC W/AUTO DIFF WBC: CPT

## 2017-09-18 RX ADMIN — HYOSCYAMINE SULFATE SCH MG: 0.12 SOLUTION/ DROPS ORAL at 17:00

## 2017-09-18 RX ADMIN — METOCLOPRAMIDE SCH MG: 5 INJECTION, SOLUTION INTRAMUSCULAR; INTRAVENOUS at 23:56

## 2017-09-18 RX ADMIN — SIMETHICONE SCH MG: 20 SUSPENSION/ DROPS ORAL at 17:00

## 2017-09-18 RX ADMIN — SIMETHICONE SCH MG: 20 SUSPENSION/ DROPS ORAL at 11:51

## 2017-09-18 RX ADMIN — HYDROMORPHONE HYDROCHLORIDE PRN MG: 1 INJECTION, SOLUTION INTRAMUSCULAR; INTRAVENOUS; SUBCUTANEOUS at 10:37

## 2017-09-18 RX ADMIN — HYDROMORPHONE HYDROCHLORIDE PRN MG: 1 INJECTION, SOLUTION INTRAMUSCULAR; INTRAVENOUS; SUBCUTANEOUS at 10:42

## 2017-09-18 RX ADMIN — HYDROCODONE BITARTRATE AND ACETAMINOPHEN SCH EACH: 7.5; 325 TABLET ORAL at 21:35

## 2017-09-18 RX ADMIN — METOCLOPRAMIDE SCH MG: 5 INJECTION, SOLUTION INTRAMUSCULAR; INTRAVENOUS at 17:00

## 2017-09-18 RX ADMIN — THIAMINE HYDROCHLORIDE SCH MLS/HR: 100 INJECTION, SOLUTION INTRAMUSCULAR; INTRAVENOUS at 11:51

## 2017-09-18 RX ADMIN — HYDROMORPHONE HYDROCHLORIDE PRN MG: 1 INJECTION, SOLUTION INTRAMUSCULAR; INTRAVENOUS; SUBCUTANEOUS at 10:17

## 2017-09-18 RX ADMIN — HYDROMORPHONE HYDROCHLORIDE PRN MG: 1 INJECTION, SOLUTION INTRAMUSCULAR; INTRAVENOUS; SUBCUTANEOUS at 11:49

## 2017-09-18 RX ADMIN — SIMETHICONE SCH MG: 20 SUSPENSION/ DROPS ORAL at 23:57

## 2017-09-18 RX ADMIN — ISODIUM CHLORIDE SCH: 0.03 SOLUTION RESPIRATORY (INHALATION) at 20:11

## 2017-09-18 RX ADMIN — ONDANSETRON PRN MG: 2 INJECTION INTRAMUSCULAR; INTRAVENOUS at 10:49

## 2017-09-18 RX ADMIN — HYOSCYAMINE SULFATE SCH MG: 0.12 SOLUTION/ DROPS ORAL at 11:51

## 2017-09-18 RX ADMIN — HYDROMORPHONE HYDROCHLORIDE PRN MG: 1 INJECTION, SOLUTION INTRAMUSCULAR; INTRAVENOUS; SUBCUTANEOUS at 10:12

## 2017-09-18 RX ADMIN — THIAMINE HYDROCHLORIDE SCH MLS/HR: 100 INJECTION, SOLUTION INTRAMUSCULAR; INTRAVENOUS at 15:09

## 2017-09-18 RX ADMIN — HYOSCYAMINE SULFATE SCH MG: 0.12 SOLUTION/ DROPS ORAL at 23:57

## 2017-09-18 RX ADMIN — METOCLOPRAMIDE SCH MG: 5 INJECTION, SOLUTION INTRAMUSCULAR; INTRAVENOUS at 11:51

## 2017-09-18 RX ADMIN — ISODIUM CHLORIDE SCH MG: 0.03 SOLUTION RESPIRATORY (INHALATION) at 12:26

## 2017-09-18 RX ADMIN — HYDROMORPHONE HYDROCHLORIDE PRN MG: 1 INJECTION, SOLUTION INTRAMUSCULAR; INTRAVENOUS; SUBCUTANEOUS at 17:27

## 2017-09-18 RX ADMIN — ISODIUM CHLORIDE SCH MG: 0.03 SOLUTION RESPIRATORY (INHALATION) at 16:08

## 2017-09-18 RX ADMIN — HYDROCODONE BITARTRATE AND ACETAMINOPHEN SCH EACH: 7.5; 325 TABLET ORAL at 15:10

## 2017-09-18 NOTE — FL
EXAMINATION TYPE: FL UGI

 

DATE OF EXAM: 9/18/2017

 

LIMITED UGI:

 

CLINICAL HISTORY:  Morbid Obesity, gastric sleeve surgery earlier today.

 

TECHNIQUE:  Limited esophagram is performed utilizing 10-20 oz of Omnipaque 350. A total of 60 second
s of fluoroscopic time was utilized during procedure. 17 spot images are saved.

 

COMPARISON:  CTA chest July 6, 2017

 

FINDINGS:  The patient swallowed contrast without difficulty or delay.  Esophageal peristalsis and mo
tility are within normal limits.  There is mild delay of flow of contrast along the proximal anastomo
sis into proximal gastric sleeve as well as mild delay through the sleeve. There is more prominent de
lay of flow from at distal anastomosis into duodenal sweep which takes 2 to 3 minutes to observe. Pat
ient does appear nauseous. No vomiting occurred. There is no evidence of contrast extravasation to haskins
ggest leak. 

 

IMPRESSION: No evidence of leak status post recent gastric sleeve surgery. There is fairly moderate o
bstruction at distal anastomosis noted.

## 2017-09-18 NOTE — P.OP
Date of Procedure: 09/18/17


Description of Procedure: 





SURGEON:  NORA SCOTT MD


PREOPERATIVE DIAGNOSIS:  


1. Morbid obesity due to excess calories. 


2. Body mass index increased to 44.6. 


3. Medical supervised weight loss. 


4. Dietary surveillance and counseling. 


5. Personal history of hypercoagulable disorder factor II. 


6. Previous history of pulmonary embolism. 


7. Spondylosis of the lower spine. 


8. Osteoarthritis of the lower back. 


9. Osteoarthritis of the left hip. 


10. Previous history of foot drop. 


11. Obstructive sleep apnea. 


12. Asplenia. 


13. Hypertension. 


14. 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 


15. 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 


16. Vitamin D deficiency. 


17. Chronic gastritis. 


18. Chronic anticoagulation. 





POSTOPERATIVE DIAGNOSIS:  


1. Morbid obesity due to excess calories. 


2. Body mass index increased to 44.6. 


3. Medical supervised weight loss. 


4. Dietary surveillance and counseling. 


5. Personal history of hypercoagulable disorder factor II. 


6. Previous history of pulmonary embolism. 


7. Spondylosis of the lower spine. 


8. Osteoarthritis of the lower back. 


9. Osteoarthritis of the left hip. 


10. Previous history of foot drop. 


11. Obstructive sleep apnea. 


12. Asplenia. 


13. Hypertension. 


14. 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 


15. 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 


16. Vitamin D deficiency. 


17. Chronic gastritis. 


18. Chronic anticoagulation. 


19.  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 

involving the greater omentum, stomach and liver


20.  Incisional ventral hernia 2 involving the epigastrium, lower abdomen 5 x 

6 cm without incarceration.





PROCEDURES PERFORMED:      


1. Laparoscopic vertical sleeve gastrectomy with 34 Niuean bougie. 


2. Laparoscopic lysis of adhesions 60 minutes.


3. Intraoperative esophagogastroduodenoscopy. 





ANESTHESIA: General with 30 mL 0.5% Marcaine with epinephrine. 


ESTIMATED BLOOD LOSS: 5 mL. 


SPECIMENS: Sleeve gastrectomy.


COMPLICATIONS: None. 





INDICATIONS: Marlin Zhong is a very pleasant 37-year-old female who initially 


[facility] 2017. She reports 


previous history of pulmonary embolism including Factor II disorder as 


she is on Coumadin. At her height of 5 feet 7-1/4 inches she comes in 


today weighing 292 pounds. Body mass index has now increased is 45.9. 


She is over 128 pounds overweight.  She now presents for sleeve gastrectomy.


All surgical options for morbid obesity had been described. A second-generation 

bariatric consent form was described in detail


including the possibility of worsened reflux disease postoperatively,


for which she demonstrated understanding. Benefits and risks of the procedure 

were


described at length. Informed consent was obtained. 





DESCRIPTION: Preoperatively, she had received Peridex 15 mL oral


solution as well as Lovenox 40 mg subcutaneously. She was brought into


the operating room and laid on a split leg table. 





After general induction, a Arvizu catheter was placed. The abdomen was


prepped and draped in a standard sterile fashion using ChloraPrep


including Ioban draping. 





Prior to incision, a time-out protocol was confirmed with surgical team


including the patient's name, procedure to be performed, as well as


preoperative medications. 





I then went to the head of the bed to perform an intraoperative


esophagogastroduodenoscopy to evaluate the distal esophagus as well as


the stomach, and final placement of the pediatric colonoscope for


a 34 Niuean bougie. 





The Olympus colonoscope was passed along the posterior oropharynx. Along the 

distal esophagus, no acute erosive esophagitis was identified. The stomach was 

remarkable for mild gastritis.  The first through third portion of duodenum was 

unremarkable.  Bile was aspirated from the stomach. Next, the scope was


positioned to the level of the antrum. The scope was laid along the distal 

aspect of the antrum and desufflated to allow for


the rest of the preparation of the sleeve gastrectomy. 





Her xiphoid to her umbilicus was measured to be approximately 26 cm


for her 5 foot, 7 inch frame. A 5 mm incision was made at approximately


15 cm from the xiphoid and along the right upper quadrant as 


she has previous history of open exploratory laparotomy with splenectomy. A 0 

degree, 5 mm


laparoscopic trocar entry was performed and entered into the peritoneal


cavity. The abdomen was insufflated to 15 mmHg of pressure, which she


tolerated well. Diagnostic laparoscopy confirmed no injury to bowel,


viscera, or mesentery upon entry.  She had severe peritoneal adhesions 


of greater omentum to the anterior abdominal wall involving the epigastrium 


including of the left upper quadrant.  Two large incisional ventral hernias were


found at the epigastrium including lower abdomen without incarceration.  


The liver surface was obscured by adhesions and adherent to the abdominal 


wall hence no requirement for the Conchita liver retractor.





Next, a 5 mm port was placed along the right lateral abdominal wall.


An additional 5 mm port was placed along the left lateral abdominal wall.  


Extensive lysis of adhesions was performed for over 30 minutes to free the 


greater omental adhesions including transverse mesocolon involving the 

epigastrium and left upper quadrant. 





A 15 mm port was placed along the epigastrium through her pre-existing hernia 

including a 12 mm port along 


the left midclavicular line.  A final 5-mm port was placed along the anterior 

axillary line at


the level of the left costal margin. 





Next attention was also brought to the stomach whereby the anterior surface was 


adherent to the abdominal wall.  Additional lysis of adhesions for another 30 


minutes occurred to free the stomach circumferentially.


The patient was placed in reverse Trendelenburg.





Additional exposure was provided with the assistant.





No large hiatal hernia defect was encountered after careful inspection 

anteriorly and posteriorly.  





The pylorus was identified then 6 cm proximally along


the greater curvature of the stomach, the area was marked. The short gastrics 

were mobilized


upwards to the angle of His. Hemostasis was excellent during this


portion of the procedure. Next, the upper pole of the stomach was


gently dissected free using a fenestrated grasper. 





Tissue reinforced tri-stapler Covidien loads were used throughout the case.





A Covidien Tri Stapler 60 mm black load was fired initially along the antrum. 

In a similar direction, 


another 60 mm black load was fired.  Two 60 mm purple


staplers were fired towards the angle of Hiss.  A final 45 mm purple stapler 

was fired.


The staple line was completely hemostatic and linear


without corkscrewing. Hemostasis was excellent. 





I then went to the head of the bed to perform the intraoperative


esophagogastroduodenoscopy leak test. The patient had been leveled. The


upper pole of the stomach was bathed using normal saline solution. 


The scope was withdrawn with careful inspection along the staple line


for which no leaks were found along the entire length. Additionally,


the sleeve was completely hemostatic without any encroachment along the


angularis incisura. Its topology was a straight tube. The GE junction


was found to lay within and beyond into the abdominal cavity below the


hiatus. No additional stricture were encountered upon placement of the


scope. The GI tract was desufflated. The patient tolerated this portion


of the procedure well. The scope was completely withdrawn.





I then rescrubbed into case, whereby the irrigation fluid was aspirated


from the abdominal cavity. Along the staple line, Tisseel fibrin sealant was 

placed along the


entire staple length. 





Attention was now brought to removal of the specimen. Please note that a


15 mm trocar port was placed along the epigastrium to allow for firing


of the stapler including removal of the sleeve gastrectomy specimen.


The specimen was gently removed en total with dimensions of 15 x 6 cm.


No contamination had occurred during this process. 





All instruments and pneumoperitoneum including irrigation fluid was


removed from the abdominal cavity.  All ports were cleaned with diluted 

hydroperoxide. The


incisions were closed using subcuticular running suture of 4-0


Monocryl. Dermabond was applied to the skin once the skin had been cleansed. 





An OptiFoam surgical dressing was placed over the sleeve gastrectomy site of 

the epigastrium.





At the end of the procedure, needle, sponge, and instrument count was


verified correct by the surgical technician. The patient was taken to


the postanesthesia care unit in stable condition. 





Intraoperative films were reviewed and discussed with the patient's


family, who were pleased with the level of care. 





FINDINGS: 


1.  Thoracic length of 26 cm.


2.  Severe intraperitoneal adhesions adding additional hour to the case.


3.  Total of 5 staplers used to create sleeve gastrectomy including tissue 

reinforced Covidien staplers 2- 60 mm


purple load, 2 - 60 mm black loads, and 45 mm purple load without reinforcement.


4.  Negative esophagogastroduodenoscopy leak test. 


5.  Incisional ventral hernia without incarceration or vomiting epigastrium and 

lower abdomen over 5 x 6 cm each.

## 2017-09-18 NOTE — P.PN
Progress Note - Text





Patient seen and evaluated this evening.  She is tolerating liquids.  She 

completed her esophagram.  Restart home medications tomorrow.  Discharge home 

in the morning.

## 2017-09-18 NOTE — P.HPADDEND
H&P Addendum


H&P Addendum Date: 09/18/17





Patient seen and evaluated.  Benefits and risks of surgery were described.  

Patient does take Coumadin will be discharged home with Lovenox as a bridge 

therapy.  We'll proceed with sleeve gastrectomy.

## 2017-09-19 VITALS — DIASTOLIC BLOOD PRESSURE: 81 MMHG | TEMPERATURE: 98.8 F | SYSTOLIC BLOOD PRESSURE: 171 MMHG

## 2017-09-19 VITALS — RESPIRATION RATE: 18 BRPM

## 2017-09-19 VITALS — HEART RATE: 85 BPM

## 2017-09-19 LAB
ANION GAP SERPL CALC-SCNC: 6 MMOL/L
BASOPHILS # BLD AUTO: 0.1 K/UL (ref 0–0.2)
BASOPHILS NFR BLD AUTO: 1 %
BUN SERPL-SCNC: 11 MG/DL (ref 7–17)
CALCIUM SPEC-MCNC: 8.6 MG/DL (ref 8.4–10.2)
CH: 29.8
CHCM: 32.9
CHLORIDE SERPL-SCNC: 106 MMOL/L (ref 98–107)
CO2 SERPL-SCNC: 26 MMOL/L (ref 22–30)
EOSINOPHIL # BLD AUTO: 0.1 K/UL (ref 0–0.7)
EOSINOPHIL NFR BLD AUTO: 0 %
ERYTHROCYTE [DISTWIDTH] IN BLOOD BY AUTOMATED COUNT: 4.23 M/UL (ref 3.8–5.4)
ERYTHROCYTE [DISTWIDTH] IN BLOOD: 14.2 % (ref 11.5–15.5)
HCT VFR BLD AUTO: 38.6 % (ref 34–46)
HDW: 2.48
HGB BLD-MCNC: 12.1 GM/DL (ref 11.4–16)
LUC NFR BLD AUTO: 1 %
LYMPHOCYTES # SPEC AUTO: 5.1 K/UL (ref 1–4.8)
LYMPHOCYTES NFR SPEC AUTO: 26 %
MAGNESIUM SPEC-SCNC: 1.9 MG/DL (ref 1.6–2.3)
MCH RBC QN AUTO: 28.6 PG (ref 25–35)
MCHC RBC AUTO-ENTMCNC: 31.4 G/DL (ref 31–37)
MCV RBC AUTO: 91.2 FL (ref 80–100)
MONOCYTES # BLD AUTO: 1.7 K/UL (ref 0–1)
MONOCYTES NFR BLD AUTO: 9 %
NEUTROPHILS # BLD AUTO: 12.7 K/UL (ref 1.3–7.7)
NEUTROPHILS NFR BLD AUTO: 64 %
NON-AFRICAN AMERICAN GFR(MDRD): >60
PHOSPHATE SERPL-MCNC: 2.8 MG/DL (ref 2.5–4.5)
POTASSIUM SERPL-SCNC: 4.5 MMOL/L (ref 3.5–5.1)
SODIUM SERPL-SCNC: 138 MMOL/L (ref 137–145)
WBC # BLD AUTO: 0.23 10*3/UL
WBC # BLD AUTO: 20 K/UL (ref 3.8–10.6)
WBC (PEROX): 18.66

## 2017-09-19 RX ADMIN — METOCLOPRAMIDE SCH MG: 5 INJECTION, SOLUTION INTRAMUSCULAR; INTRAVENOUS at 10:46

## 2017-09-19 RX ADMIN — HYDROMORPHONE HYDROCHLORIDE PRN MG: 1 INJECTION, SOLUTION INTRAMUSCULAR; INTRAVENOUS; SUBCUTANEOUS at 00:02

## 2017-09-19 RX ADMIN — HYDROMORPHONE HYDROCHLORIDE PRN MG: 1 INJECTION, SOLUTION INTRAMUSCULAR; INTRAVENOUS; SUBCUTANEOUS at 04:56

## 2017-09-19 RX ADMIN — ISODIUM CHLORIDE SCH MG: 0.03 SOLUTION RESPIRATORY (INHALATION) at 07:16

## 2017-09-19 RX ADMIN — THIAMINE HYDROCHLORIDE SCH: 100 INJECTION, SOLUTION INTRAMUSCULAR; INTRAVENOUS at 03:08

## 2017-09-19 RX ADMIN — HYDROCODONE BITARTRATE AND ACETAMINOPHEN SCH EACH: 7.5; 325 TABLET ORAL at 08:51

## 2017-09-19 RX ADMIN — THIAMINE HYDROCHLORIDE SCH: 100 INJECTION, SOLUTION INTRAMUSCULAR; INTRAVENOUS at 07:12

## 2017-09-19 RX ADMIN — ONDANSETRON PRN MG: 2 INJECTION INTRAMUSCULAR; INTRAVENOUS at 07:56

## 2017-09-19 RX ADMIN — METOCLOPRAMIDE SCH MG: 5 INJECTION, SOLUTION INTRAMUSCULAR; INTRAVENOUS at 04:57

## 2017-09-19 RX ADMIN — SIMETHICONE SCH MG: 20 SUSPENSION/ DROPS ORAL at 04:56

## 2017-09-19 RX ADMIN — ISODIUM CHLORIDE SCH MG: 0.03 SOLUTION RESPIRATORY (INHALATION) at 11:21

## 2017-09-19 RX ADMIN — HYOSCYAMINE SULFATE SCH MG: 0.12 SOLUTION/ DROPS ORAL at 12:12

## 2017-09-19 RX ADMIN — HYDROMORPHONE HYDROCHLORIDE PRN MG: 1 INJECTION, SOLUTION INTRAMUSCULAR; INTRAVENOUS; SUBCUTANEOUS at 13:16

## 2017-09-19 RX ADMIN — HYOSCYAMINE SULFATE SCH MG: 0.12 SOLUTION/ DROPS ORAL at 04:56

## 2017-09-19 RX ADMIN — SIMETHICONE SCH MG: 20 SUSPENSION/ DROPS ORAL at 12:12

## 2017-09-19 RX ADMIN — ONDANSETRON PRN MG: 2 INJECTION INTRAMUSCULAR; INTRAVENOUS at 13:16

## 2017-09-19 NOTE — P.DS
Providers


Date of admission: 


09/18/17 05:58





Expected date of discharge: 09/19/17


Attending physician: 


Lora Arias





Primary care physician: 


Lora Arias





Hospital Course: 





37-year-old female who presented on an elective basis to undergo laparoscopic 

sleeve gastrectomy with lysis of adhesions for morbid obesity BMI 45.7.  Postop 

there were no events.  Patient tolerated the bariatric clear diet.  

Electrolytes were within normal limits.  The white count was elevated to 20 

which was expected due to asplenic.  Patient was afebrile.  Patient was aware 

that she needed to start her Lovenox today to bridge while restarting Coumadin.

  Patient is on lifelong Coumadin for hypercoagulable state the INR and the 

18th was 1 





patient was felt to be hemodynamically stable and appropriate proceed with a 

discharge








Impression


Morbid obesity due to excessive calories BMI 45.7


Medical supervised weight loss


Personal history of hypercoagulable disorder on lifelong Coumadin


Prior history of a pulmonary emboli


Leukocytosis secondary to asplenia


History of obstructive sleep apnea


Status post 18th of September laparoscopic sleeve gastrectomy, lysis of 

adhesions interoperative esophagogastroduodenoscopy


A history of migraine headaches





The above impression and plan of care have been discussed and directed by 

signing physician. Tanja Tabor nurse practitioner acting as scribe for signing 

physician.





Plan - Discharge Summary


New Discharge Prescriptions: 


New


   Enoxaparin [Lovenox] 40 mg SQ Q12H #14 syringe


   Hyoscyamine Oral Drops [Levsin Drops] 0.125 mg PO Q6HR  bottle


   Simethicone 40 mg/0.6 ml Drops [Mylicon Drops] 40 mg PO Q6HR  bottle


   Omeprazole 40 mg PO DAILY #90 capsule.dr





Continue


   Cyclobenzaprine [Flexeril] 10 mg PO HS


   HYDROcodone/APAP 7.5-325MG [Norco 7.5-325] 1 tab PO TID


   Gabapentin 600 mg PO BID


   Warfarin [Coumadin] 5 mg PO TUTH


   Gabapentin [Neurontin] 1,200 mg PO HS


   Sertraline [Zoloft] 100 mg PO DAILY


   Warfarin [Coumadin] 7.5 mg PO SUMOWEFRSA





Discontinued


   Multivitamins, Thera [Multivitamin] 1 tab PO HS


Discharge Medication List





Cyclobenzaprine [Flexeril] 10 mg PO HS 02/22/17 [History]


Gabapentin 600 mg PO BID 02/22/17 [History]


HYDROcodone/APAP 7.5-325MG [Norco 7.5-325] 1 tab PO TID 02/22/17 [History]


Warfarin [Coumadin] 5 mg PO TUTH 03/02/17 [History]


Gabapentin [Neurontin] 1,200 mg PO HS 03/10/17 [History]


Sertraline [Zoloft] 100 mg PO DAILY 09/18/17 [History]


Warfarin [Coumadin] 7.5 mg PO SUMOWEFRSA 09/18/17 [History]


Enoxaparin [Lovenox] 40 mg SQ Q12H #14 syringe 09/19/17 [Rx]


Hyoscyamine Oral Drops [Levsin Drops] 0.125 mg PO Q6HR  bottle 09/19/17 [Rx]


Omeprazole 40 mg PO DAILY #90 capsule. 09/19/17 [Rx]


Simethicone 40 mg/0.6 ml Drops [Mylicon Drops] 40 mg PO Q6HR  bottle 09/19/17 [

Rx]








Follow up Appointment(s)/Referral(s): 


Lora Arias MD [Primary Care Provider] - 09/21/17 10:00 am


[facility] [NON-STAFF] - 09/21/17 10:00 am


Patient Instructions/Handouts:  Nutrition after Bariatric Surgery  (GEN), 

Laparoscopic Sleeve Gastrectomy (DC)


Activity/Diet/Wound Care/Special Instructions: 


May shower.  No bath tub soaks.  Do not remove dressing.*Protein shakes this 

Thursday.  No lifting over 4 pounds in 4 weeks.


Discharge Disposition: HOME SELF-CARE

## 2017-09-19 NOTE — P.PN
<Tanja Tabor ALYSSIA - Last Filed: 09/19/17 08:43>





Subjective





37-year-old female being seen on rounds this  morning  patient states that she 

did ambulate in the hallway this morning.   Chief complaint this morning is " 

have a migraine headache I think it's because I have not drink any caffeine"   

states the headache is improving. 


 Patient is postop done on September 18 laparoscopic sleeve gastrectomy with 

lysis of adhesions for morbid obesity pulse ox sat on room air 93% to 95% 

patient denies any dizziness lightheadedness shortness of breath or chest pain.

  Did note the white count this morning is 20   electrolytes within normal 

limits in the INR on the 18th was 1  patient does have a history of 

hypercoagulable disorder lifelong Coumadin  at home





Objective





- Vital Signs


Vital signs: 


 Vital Signs











Temp  97.9 F   09/19/17 06:56


 


Pulse  88   09/19/17 07:48


 


Resp  18   09/19/17 06:56


 


BP  158/80   09/19/17 06:56


 


Pulse Ox  93 L  09/19/17 07:48








 Intake & Output











 09/18/17 09/19/17 09/19/17





 18:59 06:59 18:59


 


Intake Total 2450 300 


 


Output Total 405 600 


 


Balance 2045 -300 


 


Weight 132.251 kg  


 


Intake:   


 


  IV 2450  


 


    0.9% NaCl with KCl 20 Meq 300  





    /l 1,000 ml @ 150 mls/hr   





    IV .Q6H40M [facility] Rx#:   





    [id number]   


 


  Oral  300 


 


Output:   


 


  Urine 400 600 


 


  Estimated Blood Loss 5  


 


Other:   


 


  Voiding Method Toilet  


 


  # Voids  2 














- Exam





GENERAL APPEARANCE: Pleasant 37-year-old female sitting up in bed patient is 

alert, oriented, in no acute distress.


VITAL SIGNS: Reviewed


HEENT: Head is normocephalic and atraumatic. Pupils are equal and reactive. The 

nares are patent. Oropharynx is clear without lesions.


NECK: Supple without lymphadenopathy. Traches midline.


HEART: S1, S2. Regular rate and rhythm.  Denies chest


LUNGS: No crackles or wheezes are heard.  Adequate air entry bilaterally no 

cough noted


ABDOMEN: Soft dressing to surgical site dry and discopathy sites no redness no 

drainage nontender, nondistended with good bowel sounds. No peritoneal signs. 

No palpable organomegaly or masses.  Reports no nausea no vomiting


EXTREMITIES: Normal skin color and turgor. No cyanosis, rash, ulceration, 

clubbing or edema. Radial pedal pulses are 2/4 bilaterally.  Venodyne's 

bilaterally


NEUROLOGICAL: No focal deficits. Strength and sensation are grossly intact.








- Labs


CBC & Chem 7: 


 09/19/17 06:30





 09/19/17 06:30


Labs: 


 Abnormal Lab Results - Last 24 Hours (Table)











  09/19/17 Range/Units





  06:30 


 


WBC  20.0 H  (3.8-10.6)  k/uL


 


Plt Count  547 H  (150-450)  k/uL


 


Neutrophils #  12.7 H  (1.3-7.7)  k/uL


 


Lymphocytes #  5.1 H  (1.0-4.8)  k/uL


 


Monocytes #  1.7 H  (0-1.0)  k/uL














Assessment and Plan


Plan: 





Impression


Morbid obesity due to excessive calories BMI 45.7


Medical supervised weight loss


Personal history of hypercoagulable disorder on lifelong Coumadin


Prior history of a pulmonary emboli


Leukocytosis secondary to asplenia


History of obstructive sleep apnea


Status post 18th of September laparoscopic sleeve gastrectomy, lysis of 

adhesions interoperative esophagogastroduodenoscopy


A history of migraine headaches











Plan


Continue postop surgical care


Prepped for probable discharge today


Will need Lovenox for 1 week to bridge Coumadin


Resume home meds as appropriate


DVT and GI prophylaxis 


increase activity














The above impression and plan of care have been discussed and directed by 

signing physician. Tanja Tabor nurse practitioner acting as scribe for signing 

physician.





<Lora Arias N - Last Filed: 09/19/17 10:32>





Objective





- Vital Signs


Vital signs: 


 Vital Signs











Temp  97.9 F   09/19/17 06:56


 


Pulse  86   09/19/17 08:00


 


Resp  18   09/19/17 08:00


 


BP  158/80   09/19/17 06:56


 


Pulse Ox  93 L  09/19/17 07:48








 Intake & Output











 09/18/17 09/19/17 09/19/17





 18:59 06:59 18:59


 


Intake Total 2450 300 


 


Output Total 405 600 0


 


Balance 2045 -300 0


 


Weight 132.251 kg  132.251 kg


 


Intake:   


 


  IV 2450  


 


    0.9% NaCl with KCl 20 Meq 300  





    /l 1,000 ml @ 150 mls/hr   





    IV .Q6H40M [facility] Rx#:   





    [id number]   


 


  Oral  300 


 


Output:   


 


  Urine 400 600 0


 


  Estimated Blood Loss 5  


 


Other:   


 


  Voiding Method Toilet  Toilet


 


  # Voids  2 2














- Labs


CBC & Chem 7: 


 09/19/17 06:30





 09/19/17 06:30


Labs: 


 Abnormal Lab Results - Last 24 Hours (Table)











  09/19/17 Range/Units





  06:30 


 


WBC  20.0 H  (3.8-10.6)  k/uL


 


Plt Count  547 H  (150-450)  k/uL


 


Neutrophils #  12.7 H  (1.3-7.7)  k/uL


 


Lymphocytes #  5.1 H  (1.0-4.8)  k/uL


 


Monocytes #  1.7 H  (0-1.0)  k/uL

## 2017-09-20 ENCOUNTER — HOSPITAL ENCOUNTER (OUTPATIENT)
Dept: HOSPITAL 47 - BARWHC3 | Age: 37
End: 2017-09-20
Payer: COMMERCIAL

## 2017-09-20 ENCOUNTER — HOSPITAL ENCOUNTER (OUTPATIENT)
Dept: HOSPITAL 47 - PROCWHC3 | Age: 37
Discharge: HOME | End: 2017-09-20
Payer: COMMERCIAL

## 2017-09-20 VITALS
TEMPERATURE: 98.4 F | HEART RATE: 85 BPM | RESPIRATION RATE: 16 BRPM | DIASTOLIC BLOOD PRESSURE: 86 MMHG | SYSTOLIC BLOOD PRESSURE: 128 MMHG

## 2017-09-20 VITALS
DIASTOLIC BLOOD PRESSURE: 86 MMHG | SYSTOLIC BLOOD PRESSURE: 128 MMHG | RESPIRATION RATE: 16 BRPM | HEART RATE: 85 BPM | TEMPERATURE: 98.4 F

## 2017-09-20 VITALS — BODY MASS INDEX: 45.3 KG/M2

## 2017-09-20 DIAGNOSIS — E66.01: Primary | ICD-10-CM

## 2017-09-20 DIAGNOSIS — E86.0: Primary | ICD-10-CM

## 2017-09-20 LAB
INR PPP: 1.2 (ref ?–1.2)
PT BLD: 11.5 SEC (ref 9–12)

## 2017-09-20 PROCEDURE — 85610 PROTHROMBIN TIME: CPT

## 2017-09-20 PROCEDURE — 96360 HYDRATION IV INFUSION INIT: CPT

## 2017-09-20 PROCEDURE — 99211 OFF/OP EST MAY X REQ PHY/QHP: CPT

## 2017-09-20 PROCEDURE — 96375 TX/PRO/DX INJ NEW DRUG ADDON: CPT

## 2017-09-20 PROCEDURE — 96361 HYDRATE IV INFUSION ADD-ON: CPT

## 2017-09-20 PROCEDURE — 97802 MEDICAL NUTRITION INDIV IN: CPT

## 2017-09-20 RX ADMIN — ONDANSETRON SCH MG: 2 INJECTION INTRAMUSCULAR; INTRAVENOUS at 15:00

## 2017-09-20 RX ADMIN — ONDANSETRON SCH MG: 2 INJECTION INTRAMUSCULAR; INTRAVENOUS at 14:40

## 2017-09-20 RX ADMIN — CEFAZOLIN SCH MLS/HR: 330 INJECTION, POWDER, FOR SOLUTION INTRAMUSCULAR; INTRAVENOUS at 15:15

## 2017-09-20 RX ADMIN — CEFAZOLIN SCH MLS/HR: 330 INJECTION, POWDER, FOR SOLUTION INTRAMUSCULAR; INTRAVENOUS at 14:15

## 2017-09-28 ENCOUNTER — HOSPITAL ENCOUNTER (OUTPATIENT)
Age: 37
Discharge: HOME | End: 2017-09-28
Payer: COMMERCIAL

## 2017-09-28 PROCEDURE — 97803 MED NUTRITION INDIV SUBSEQ: CPT

## 2017-09-28 PROCEDURE — 99211 OFF/OP EST MAY X REQ PHY/QHP: CPT

## 2017-09-30 NOTE — P.PN
Progress Note - Text





DATE OF SERVICE: 08/23/2017 





CHIEF COMPLAINT: Bariatric assessment. 





HISTORY OF PRESENT ILLNESS: 


Marlin Zhong is a very pleasant 37-year-old female who initially presented to 

the [facility] in February 2017. At her height of 5 feet 7-1/4 inches she 

comes in 


today weighing 297 pounds. She has gained 11 pounds in the past 4 months.  Her 

highest weight was 315 pounds.  Body mass index is down from 49.4 to 46.2. 


She is 139 pounds overweight.  As a result of her obesity, she has developed 

hypertension including obstructive sleep apnea and osteoarthritis of the lower 

joints.  


She is evaluating for sleeve gastrectomy.





PAST MEDICAL HISTORY: 


1. 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 


2. Factor II disorder. 


3. Previous history of pulmonary embolism to the lungs. 


4. Osteoarthritis of the lower back. 


5. Osteoarthritis of the left hip. 


6. Sciatica of the left leg. 


7. History of foot drop. 


8. Obstructive sleep apnea. 


9. Morbid obesity due to excess calories. 


10. Chronic pain syndrome. 


11. History of spondylolisthesis. 


12.  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





PAST SURGICAL HISTORY: 


1. Tonsillectomy. 


2. Splenectomy. 


3. Cholecystectomy. 


4.  EGD.





MEDICATIONS:


1. Gabapentin. 


2. Norco 7.5.


3. Flexeril. 


4. Adipex.


5. Multivitamin. 


6. Coumadin. 





ALLERGIES: MORPHINE. 





SOCIAL HISTORY: Former tobacco user. 





FAMILY HISTORY: No reports of esophageal or stomach cancer. 





REVIEW OF SYSTEMS:


CONSTITUTIONAL: Ideal body weight of 178 pounds. Highest weight was 


315 pounds. At her height of 5 feet 7-1/4 inches she comes in 


today weighing 286 pounds. She has actually gained 2 pounds in the 


past 2 months. Body mass index has now increased from 44.4 up to 44.6. 


She is 128 pounds overweight. 


HEENT: No troubles with vision, hearing. Denies dysphagia. 


ENDOCRINE: No reports of diabetes or thyroid disorders. 


RESPIRATORY: Denies any dyspnea on exertion. She has previous history 


of pulmonary embolism.  


CARDIOVASCULAR: No reports of palpitations or heart attack. 


GASTROINTESTINAL: No reports of food allergies. No reports of 


diarrhea; however, she has constipation.  


MUSCULOSKELETAL: Has spondylosis of the lower back. Chronic lower back 


pain. Also reports osteoarthritis of the hip and sciatica.  


NEURO: No reports of stroke or seizure disorder. Has chronic pain. 


PSYCH: No reports of depression or suicidal ideation.  


HEMATOLOGIC: History of hypercoagulable disorder. Previous history of 


pulmonary embolism. She is on chronic Coumadin therapy.  





PHYSICAL EXAM:


VITAL SIGNS:  5 foot 7-1/4 frame, 297 pounds. Body mass index 46.2.


 Vital Signs











Temp  99.0 F   08/23/17 15:34


 


Pulse  87   08/23/17 15:34


 


Resp  16   08/23/17 15:34


 


BP  136/86   08/23/17 15:34


 


Pulse Ox      











GENERAL: Well-developed, pleasant female in no acute distress. 


HEENT: No scleral icterus. Extraocular movements grossly intact. Moist 


buccal mucosa.  


NECK: Supple without lymphadenopathy. 


CHEST: Nonlabored respirations with equal breath excursions. 


CARDIOVASCULAR: Regular rate and rhythm. 


ABDOMEN: Soft, nontender, nondistended. 


MUSCULOSKELETAL: No clubbing, cyanosis, or edema. 


NEURO: No focal or lateralizing signs.  Cranial nerves II through XII grossly 

intact.


PSYCH: Appropriate affect. Alert and oriented to person, place, and time.  


SKIN: Well perfused.  Good skin turgor.





EGD FINDINGS:


Squamocolumnar junction 39 cm from the incisors.


Diaphragmatic hiatus at 39 cm from the incisors


Hill grade 2lower esophageal valve.


LA grade A erosive esophagitis.


Active gastritis superficial along antrum.


No active duodenitis.





ASSESSMENT: 


1. Morbid obesity due to excess calories. 


2. Body mass index 49.4 down to 46.2.


5. Personal history of hypercoagulable disorder factor II. 


6. Previous history of pulmonary embolism. 


7. Spondylosis of the lower spine. 


8. Osteoarthritis of the lower back. 


9. Osteoarthritis of the left hip. 


10. Obstructive sleep apnea. 


11. Chronic anticoagulation. 


12. Asplenia. 


13. Hypertension. 


14. 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 


15. 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 


16. Vitamin D deficiency. 


17. Chronic gastritis. 





PLAN: 


1. Bariatric options between a sleeve and a Scar-en-Y gastric bypass were 

reviewed in detail. She elected for a sleeve gastrectomy.


2. The [place] [facility] Data was also reviewed with benefits 

and risks as described.  


3. An 8 page second-generation bariatric consent form was reviewed in detail 

including potential of bleeding, infection, leaks, adequate weight loss, 

nutritional deficiencies which she demonstrated understanding of the risks.


4.  Recommend 2 week high-protein low caloric 800 kcal diet to address 

hepatomegaly.


5.  Preoperative labs including compress metabolic panel and CBC with type and 

screen.


6.  DVT prophylaxis per [place] [facility].


7.  Antibiotic prophylaxis.


8.  Inpatient hospitalization anticipated for more than 2 nights.


9.  All questions and concerns were addressed with the patient.


10.  Recommend diet classes.


11.  She is at increased risk for venous thrombolic event.  Lovenox bridge was 

described with holding of Coumadin at least 5 days preoperatively.

## 2017-11-15 ENCOUNTER — HOSPITAL ENCOUNTER (OUTPATIENT)
Dept: HOSPITAL 47 - BARWHC3 | Age: 37
Discharge: HOME | End: 2017-11-15
Payer: COMMERCIAL

## 2017-11-15 VITALS
RESPIRATION RATE: 16 BRPM | SYSTOLIC BLOOD PRESSURE: 132 MMHG | DIASTOLIC BLOOD PRESSURE: 83 MMHG | TEMPERATURE: 98.3 F | HEART RATE: 65 BPM

## 2017-11-15 VITALS — BODY MASS INDEX: 41.1 KG/M2

## 2017-11-15 DIAGNOSIS — Z86.711: ICD-10-CM

## 2017-11-15 DIAGNOSIS — K59.00: ICD-10-CM

## 2017-11-15 DIAGNOSIS — Z86.2: ICD-10-CM

## 2017-11-15 DIAGNOSIS — M47.816: ICD-10-CM

## 2017-11-15 DIAGNOSIS — Z48.815: Primary | ICD-10-CM

## 2017-11-15 DIAGNOSIS — E66.01: ICD-10-CM

## 2017-11-15 DIAGNOSIS — I10: ICD-10-CM

## 2017-11-15 DIAGNOSIS — E55.9: ICD-10-CM

## 2017-11-15 DIAGNOSIS — Z98.84: ICD-10-CM

## 2017-11-15 DIAGNOSIS — K43.9: ICD-10-CM

## 2017-11-15 DIAGNOSIS — K29.50: ICD-10-CM

## 2017-11-15 DIAGNOSIS — Q89.01: ICD-10-CM

## 2017-11-15 DIAGNOSIS — M16.12: ICD-10-CM

## 2017-11-15 DIAGNOSIS — Z79.01: ICD-10-CM

## 2017-11-15 DIAGNOSIS — G47.33: ICD-10-CM

## 2017-11-15 PROCEDURE — 99211 OFF/OP EST MAY X REQ PHY/QHP: CPT

## 2017-11-15 PROCEDURE — 97803 MED NUTRITION INDIV SUBSEQ: CPT

## 2018-01-14 NOTE — P.PN
Subjective


Progress Note Date: 11/15/17





DATE OF SERVICE: 11/15/2017 





CHIEF COMPLAINT: Status post sleeve gastrectomy.





HISTORY OF PRESENT ILLNESS: 


Marlin Zhong is a very pleasant 37-year-old female who is status post sleeve 

gastrectomy, 9/18/17.  At her height of 5 feet 7-1/4 inches she weighs 264 

pounds. She has lost 27 pounds in 2 months.  Her highest weight was 315 pounds.

  Her lifetime weight loss is 51 pounds.  No reports of reflux disease.  She 

eats 60 g protein daily.  No reports of hair loss.  She denies gastroesophageal 

reflux disease.  She still has lower back pain.  She has more energy.  She is 

back on a blood thinner, Xarelto.  She denies any blood in her stools.  She 

reports constipation.  She is drinking 60 ounces of fluids daily.  She is doing 

yoga. She has known history of pre-existing ventral hernia.  Body mass index is 

down from 49.1 to 41.1. She is 106 pounds overweight.





PHYSICAL EXAM:


VITAL SIGNS:  5 foot 7-1/4 frame, 264 pounds. Body mass index 45.3. 


 Vital Signs











Temp  98.3 F   11/15/17 17:01


 


Pulse  65   11/15/17 17:01


 


Resp  16   11/15/17 17:01


 


BP  132/83   11/15/17 17:01


 


Pulse Ox      











GENERAL: Well-developed, pleasant female in no acute distress. 


HEENT: No scleral icterus. Extraocular movements grossly intact. Moist buccal 

mucosa.  


NECK: Supple without lymphadenopathy. 


CHEST: Nonlabored respirations with equal breath excursions. 


CARDIOVASCULAR: Regular rate and rhythm. 


ABDOMEN: Soft, nondistended.  Dressing discontinued.  No signs of infection.


MUSCULOSKELETAL: No clubbing, cyanosis, or edema. 


NEURO: No focal or lateralizing signs.  Cranial nerves II through XII grossly 

intact.


PSYCH: Appropriate affect. Alert and oriented to person, place, and time.  


SKIN: Well perfused.  Good skin turgor.





ASSESSMENT: 


1. Morbid obesity due to excess calories. 


2. Body mass index 49.1 down to 41.1.


5. Personal history of hypercoagulable disorder factor II. 


6. Previous history of pulmonary embolism. 


7. Spondylosis of the lower spine. 


8. Osteoarthritis of the lower back. 


9. Osteoarthritis of the left hip. 


10. Obstructive sleep apnea. 


11. Chronic anticoagulation. 


12. Asplenia. 


13. Hypertension. 


14. 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 


15. 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 


16. Vitamin D deficiency. 


17. Chronic gastritis. 


18.  Status post sleeve gastrectomy.


19.  History of ventral hernia present prior to bariatric procedure.





PLAN: 


1.  Recommend bariatric metabolic panel.


2.  Recommend follow-up in December 2017.





Objective





- Vital Signs


Vital signs: 


 Vital Signs











Temp  98.3 F   11/15/17 17:01


 


Pulse  65   11/15/17 17:01


 


Resp  16   11/15/17 17:01


 


BP  132/83   11/15/17 17:01


 


Pulse Ox      








 Intake & Output











 11/14/17 11/15/17 11/15/17





 18:59 06:59 18:59


 


Weight   119.93 kg

## 2018-01-17 ENCOUNTER — HOSPITAL ENCOUNTER (OUTPATIENT)
Dept: HOSPITAL 47 - BARWHC3 | Age: 38
Discharge: HOME | End: 2018-01-17
Payer: COMMERCIAL

## 2018-01-17 VITALS — TEMPERATURE: 97.8 F | DIASTOLIC BLOOD PRESSURE: 75 MMHG | SYSTOLIC BLOOD PRESSURE: 116 MMHG | HEART RATE: 81 BPM

## 2018-01-17 VITALS — BODY MASS INDEX: 38.7 KG/M2

## 2018-01-17 DIAGNOSIS — N19: ICD-10-CM

## 2018-01-17 DIAGNOSIS — E21.1: ICD-10-CM

## 2018-01-17 DIAGNOSIS — K74.1: ICD-10-CM

## 2018-01-17 DIAGNOSIS — K50.90: ICD-10-CM

## 2018-01-17 DIAGNOSIS — E55.9: ICD-10-CM

## 2018-01-17 DIAGNOSIS — E66.01: Primary | ICD-10-CM

## 2018-01-17 DIAGNOSIS — D50.9: ICD-10-CM

## 2018-01-17 DIAGNOSIS — E89.1: ICD-10-CM

## 2018-01-17 DIAGNOSIS — E44.0: ICD-10-CM

## 2018-01-17 LAB
ALBUMIN SERPL-MCNC: 3.7 G/DL (ref 3.5–5)
ALP SERPL-CCNC: 65 U/L (ref 38–126)
ALT SERPL-CCNC: 48 U/L (ref 9–52)
ANION GAP SERPL CALC-SCNC: 10 MMOL/L
APTT BLD: 26.7 SEC (ref 22–30)
AST SERPL-CCNC: 29 U/L (ref 14–36)
BUN SERPL-SCNC: 14 MG/DL (ref 7–17)
CALCIUM SPEC-MCNC: 9.8 MG/DL (ref 8.4–10.2)
CHLORIDE SERPL-SCNC: 103 MMOL/L (ref 98–107)
CHOLEST SERPL-MCNC: 175 MG/DL (ref ?–200)
CO2 SERPL-SCNC: 28 MMOL/L (ref 22–30)
ERYTHROCYTE [DISTWIDTH] IN BLOOD BY AUTOMATED COUNT: 4.71 M/UL (ref 3.8–5.4)
ERYTHROCYTE [DISTWIDTH] IN BLOOD: 14.1 % (ref 11.5–15.5)
GLUCOSE SERPL-MCNC: 90 MG/DL (ref 74–99)
HCT VFR BLD AUTO: 42.3 % (ref 34–46)
HDLC SERPL-MCNC: 43 MG/DL (ref 40–60)
HGB BLD-MCNC: 13.3 GM/DL (ref 11.4–16)
INR PPP: 1.1 (ref ?–1.2)
LDLC SERPL CALC-MCNC: 119 MG/DL (ref 0–99)
MAGNESIUM SPEC-SCNC: 1.9 MG/DL (ref 1.6–2.3)
MCH RBC QN AUTO: 28.2 PG (ref 25–35)
MCHC RBC AUTO-ENTMCNC: 31.4 G/DL (ref 31–37)
MCV RBC AUTO: 89.7 FL (ref 80–100)
PHOSPHATE SERPL-MCNC: 3.5 MG/DL (ref 2.5–4.5)
PLATELET # BLD AUTO: 521 K/UL (ref 150–450)
POTASSIUM SERPL-SCNC: 4.4 MMOL/L (ref 3.5–5.1)
PROT SERPL-MCNC: 6.8 G/DL (ref 6.3–8.2)
PT BLD: 10.8 SEC (ref 9–12)
SODIUM SERPL-SCNC: 141 MMOL/L (ref 137–145)
TRIGL SERPL-MCNC: 65 MG/DL (ref ?–150)
WBC # BLD AUTO: 10.4 K/UL (ref 3.8–10.6)

## 2018-01-17 PROCEDURE — 82607 VITAMIN B-12: CPT

## 2018-01-17 PROCEDURE — 84590 ASSAY OF VITAMIN A: CPT

## 2018-01-17 PROCEDURE — 82728 ASSAY OF FERRITIN: CPT

## 2018-01-17 PROCEDURE — 36415 COLL VENOUS BLD VENIPUNCTURE: CPT

## 2018-01-17 PROCEDURE — 83036 HEMOGLOBIN GLYCOSYLATED A1C: CPT

## 2018-01-17 PROCEDURE — 80061 LIPID PANEL: CPT

## 2018-01-17 PROCEDURE — 83970 ASSAY OF PARATHORMONE: CPT

## 2018-01-17 PROCEDURE — 99211 OFF/OP EST MAY X REQ PHY/QHP: CPT

## 2018-01-17 PROCEDURE — 97803 MED NUTRITION INDIV SUBSEQ: CPT

## 2018-01-17 PROCEDURE — 84134 ASSAY OF PREALBUMIN: CPT

## 2018-01-17 PROCEDURE — 82746 ASSAY OF FOLIC ACID SERUM: CPT

## 2018-01-17 PROCEDURE — 83735 ASSAY OF MAGNESIUM: CPT

## 2018-01-17 PROCEDURE — 82306 VITAMIN D 25 HYDROXY: CPT

## 2018-01-17 PROCEDURE — 85027 COMPLETE CBC AUTOMATED: CPT

## 2018-01-17 PROCEDURE — 85610 PROTHROMBIN TIME: CPT

## 2018-01-17 PROCEDURE — 84255 ASSAY OF SELENIUM: CPT

## 2018-01-17 PROCEDURE — 85730 THROMBOPLASTIN TIME PARTIAL: CPT

## 2018-01-17 PROCEDURE — 84100 ASSAY OF PHOSPHORUS: CPT

## 2018-01-17 PROCEDURE — 83540 ASSAY OF IRON: CPT

## 2018-01-17 PROCEDURE — 80053 COMPREHEN METABOLIC PANEL: CPT

## 2018-01-17 PROCEDURE — 84425 ASSAY OF VITAMIN B-1: CPT

## 2018-01-17 PROCEDURE — 84630 ASSAY OF ZINC: CPT

## 2018-01-17 PROCEDURE — 83550 IRON BINDING TEST: CPT

## 2018-01-17 PROCEDURE — 84443 ASSAY THYROID STIM HORMONE: CPT

## 2018-01-17 PROCEDURE — 82525 ASSAY OF COPPER: CPT

## 2018-01-18 LAB
HBA1C MFR BLD: 5.1 % (ref 4–6)
PTH-INTACT SERPL-MCNC: 46.4 PG/ML (ref 14–72)
VIT B12 SERPL-MCNC: 1456 PG/ML (ref 200–944)

## 2018-01-19 LAB — ZINC SERPL-MCNC: 67 UG/DL (ref 60–130)

## 2018-06-19 ENCOUNTER — HOSPITAL ENCOUNTER (OUTPATIENT)
Dept: HOSPITAL 47 - EC | Age: 38
Setting detail: OBSERVATION
LOS: 1 days | Discharge: HOME | End: 2018-06-20
Attending: FAMILY MEDICINE | Admitting: FAMILY MEDICINE
Payer: COMMERCIAL

## 2018-06-19 VITALS — BODY MASS INDEX: 36.2 KG/M2

## 2018-06-19 DIAGNOSIS — R00.2: Primary | ICD-10-CM

## 2018-06-19 DIAGNOSIS — D68.2: ICD-10-CM

## 2018-06-19 DIAGNOSIS — M19.90: ICD-10-CM

## 2018-06-19 DIAGNOSIS — Z79.01: ICD-10-CM

## 2018-06-19 DIAGNOSIS — Z82.49: ICD-10-CM

## 2018-06-19 DIAGNOSIS — Z86.711: ICD-10-CM

## 2018-06-19 DIAGNOSIS — Z88.5: ICD-10-CM

## 2018-06-19 DIAGNOSIS — Z90.81: ICD-10-CM

## 2018-06-19 DIAGNOSIS — M47.9: ICD-10-CM

## 2018-06-19 DIAGNOSIS — I49.3: ICD-10-CM

## 2018-06-19 DIAGNOSIS — Z79.891: ICD-10-CM

## 2018-06-19 DIAGNOSIS — E66.9: ICD-10-CM

## 2018-06-19 DIAGNOSIS — Z87.891: ICD-10-CM

## 2018-06-19 DIAGNOSIS — Z79.899: ICD-10-CM

## 2018-06-19 DIAGNOSIS — R06.02: ICD-10-CM

## 2018-06-19 DIAGNOSIS — G47.30: ICD-10-CM

## 2018-06-19 DIAGNOSIS — Z83.2: ICD-10-CM

## 2018-06-19 DIAGNOSIS — Z99.89: ICD-10-CM

## 2018-06-19 DIAGNOSIS — Z98.84: ICD-10-CM

## 2018-06-19 DIAGNOSIS — R07.89: ICD-10-CM

## 2018-06-19 LAB
ALBUMIN SERPL-MCNC: 3.9 G/DL (ref 3.5–5)
ALP SERPL-CCNC: 58 U/L (ref 38–126)
ALT SERPL-CCNC: 38 U/L (ref 9–52)
ANION GAP SERPL CALC-SCNC: 9 MMOL/L
APTT BLD: 31 SEC (ref 22–30)
AST SERPL-CCNC: 28 U/L (ref 14–36)
BASOPHILS # BLD AUTO: 0.1 K/UL (ref 0–0.2)
BASOPHILS NFR BLD AUTO: 1 %
BUN SERPL-SCNC: 10 MG/DL (ref 7–17)
CALCIUM SPEC-MCNC: 9.7 MG/DL (ref 8.4–10.2)
CHLORIDE SERPL-SCNC: 100 MMOL/L (ref 98–107)
CK SERPL-CCNC: 48 U/L (ref 30–135)
CO2 SERPL-SCNC: 29 MMOL/L (ref 22–30)
D DIMER PPP FEU-MCNC: 0.21 MG/L FEU (ref ?–0.6)
EOSINOPHIL # BLD AUTO: 0.4 K/UL (ref 0–0.7)
EOSINOPHIL NFR BLD AUTO: 3 %
ERYTHROCYTE [DISTWIDTH] IN BLOOD BY AUTOMATED COUNT: 4.61 M/UL (ref 3.8–5.4)
ERYTHROCYTE [DISTWIDTH] IN BLOOD: 13.2 % (ref 11.5–15.5)
GLUCOSE SERPL-MCNC: 83 MG/DL (ref 74–99)
HCT VFR BLD AUTO: 41.3 % (ref 34–46)
HGB BLD-MCNC: 13.4 GM/DL (ref 11.4–16)
INR PPP: 2.9 (ref ?–1.2)
LYMPHOCYTES # SPEC AUTO: 6.6 K/UL (ref 1–4.8)
LYMPHOCYTES NFR SPEC AUTO: 55 %
MAGNESIUM SPEC-SCNC: 1.9 MG/DL (ref 1.6–2.3)
MCH RBC QN AUTO: 29 PG (ref 25–35)
MCHC RBC AUTO-ENTMCNC: 32.4 G/DL (ref 31–37)
MCV RBC AUTO: 89.6 FL (ref 80–100)
MONOCYTES # BLD AUTO: 0.9 K/UL (ref 0–1)
MONOCYTES NFR BLD AUTO: 7 %
NEUTROPHILS # BLD AUTO: 3.9 K/UL (ref 1.3–7.7)
NEUTROPHILS NFR BLD AUTO: 32 %
PLATELET # BLD AUTO: 484 K/UL (ref 150–450)
POTASSIUM SERPL-SCNC: 4.2 MMOL/L (ref 3.5–5.1)
PROT SERPL-MCNC: 7 G/DL (ref 6.3–8.2)
PT BLD: 26.1 SEC (ref 9–12)
SODIUM SERPL-SCNC: 138 MMOL/L (ref 137–145)
TROPONIN I SERPL-MCNC: <0.012 NG/ML (ref 0–0.03)
WBC # BLD AUTO: 12.2 K/UL (ref 3.8–10.6)

## 2018-06-19 PROCEDURE — 71046 X-RAY EXAM CHEST 2 VIEWS: CPT

## 2018-06-19 PROCEDURE — 80053 COMPREHEN METABOLIC PANEL: CPT

## 2018-06-19 PROCEDURE — 96361 HYDRATE IV INFUSION ADD-ON: CPT

## 2018-06-19 PROCEDURE — 93270 REMOTE 30 DAY ECG REV/REPORT: CPT

## 2018-06-19 PROCEDURE — 84443 ASSAY THYROID STIM HORMONE: CPT

## 2018-06-19 PROCEDURE — 80061 LIPID PANEL: CPT

## 2018-06-19 PROCEDURE — 36415 COLL VENOUS BLD VENIPUNCTURE: CPT

## 2018-06-19 PROCEDURE — 80306 DRUG TEST PRSMV INSTRMNT: CPT

## 2018-06-19 PROCEDURE — 83735 ASSAY OF MAGNESIUM: CPT

## 2018-06-19 PROCEDURE — 99285 EMERGENCY DEPT VISIT HI MDM: CPT

## 2018-06-19 PROCEDURE — 84439 ASSAY OF FREE THYROXINE: CPT

## 2018-06-19 PROCEDURE — 96360 HYDRATION IV INFUSION INIT: CPT

## 2018-06-19 PROCEDURE — 85025 COMPLETE CBC W/AUTO DIFF WBC: CPT

## 2018-06-19 PROCEDURE — 85730 THROMBOPLASTIN TIME PARTIAL: CPT

## 2018-06-19 PROCEDURE — 93306 TTE W/DOPPLER COMPLETE: CPT

## 2018-06-19 PROCEDURE — 82553 CREATINE MB FRACTION: CPT

## 2018-06-19 PROCEDURE — 82550 ASSAY OF CK (CPK): CPT

## 2018-06-19 PROCEDURE — 84484 ASSAY OF TROPONIN QUANT: CPT

## 2018-06-19 PROCEDURE — 93005 ELECTROCARDIOGRAM TRACING: CPT

## 2018-06-19 PROCEDURE — 93271 ECG/MONITORING AND ANALYSIS: CPT

## 2018-06-19 PROCEDURE — 85379 FIBRIN DEGRADATION QUANT: CPT

## 2018-06-19 PROCEDURE — 82607 VITAMIN B-12: CPT

## 2018-06-19 PROCEDURE — 85610 PROTHROMBIN TIME: CPT

## 2018-06-19 NOTE — XR
EXAMINATION TYPE: XR chest 2V

 

DATE OF EXAM: 6/19/2018

 

COMPARISON: 5/8/2017

 

HISTORY: Chest pain

 

TECHNIQUE:  Frontal and lateral views of the chest are obtained.

 

FINDINGS:  Heart and mediastinum are normal. Lungs are clear. Diaphragm is normal. Bony thorax is int
act.

 

IMPRESSION:  Normal chest. No change.

## 2018-06-19 NOTE — ED
Chest Pain HPI





- General


Chief Complaint: Chest Pain


Stated Complaint: chest pain


Time Seen by Provider: 06/19/18 18:02


Source: patient


Mode of arrival: wheelchair


Limitations: no limitations





- History of Present Illness


Initial Comments: 


38 years O female comes in with a chest pressure, she said she has a 

palpitation ongoing for about 2 weeks now she is also short winded she denies 

any chest pain with deep breaths no fever no chills denies any nausea no 

vomiting no cold sweats pain is in the retrosternal area does radiate past 

medical history is unremarkable she does not smoke there is a family history of 

heart disease he said her dad had a heart attack admitted for CT is she denies 

any chest pain at this point.  She does have a history of pulmonary embolism 

and she is on a Coumadin at this point.  Review of system is unremarkable 

otherwise








- Related Data


 Home Medications











 Medication  Instructions  Recorded  Confirmed


 


Gabapentin 600 mg PO BID@0800,1200 02/22/17 06/19/18


 


Gabapentin [Neurontin] 1,200 mg PO HS 03/10/17 06/19/18


 


SUMAtriptan SUCCINATE [Imitrex] 25 mg PO DAILY PRN 06/19/18 06/19/18


 


Warfarin [Coumadin] 5 mg PO TUTH 06/19/18 06/19/18


 


Warfarin [Coumadin] 7.5 mg PO SUMOWEFRSA 06/19/18 06/19/18











 Allergies











Allergy/AdvReac Type Severity Reaction Status Date / Time


 


morphine AdvReac Severe Became Verified 06/19/18 18:37





   Unresponsive  














Review of Systems


ROS Statement: 


Those systems with pertinent positive or pertinent negative responses have been 

documented in the HPI.





ROS Other: All systems not noted in ROS Statement are negative.





EKG Findings





- EKG Comments:


EKG Findings:: EKG is sinus rhythm with the frequent PVCs ventricular rate is 

62 MD interval is 136 QRS duration is 82 QT/QTc is 4:30/436 review of this EKG 

shows multiple PVCs no ST elevation or ST depression noticed noticed some 

flattening of the T-wave in aVL





Past Medical History


Past Medical History: Blood Disorder, Musculoskeletal Disorder, Osteoarthritis (

OA), Pulmonary Embolus (PE), Sleep Apnea/CPAP/BIPAP


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 

2003, severe spondylosis of back,


History of Any Multi-Drug Resistant Organisms: None Reported


Past Surgical History: Bariatric Surgery, Tonsillectomy


Additional Past Surgical History / Comment(s): splenectomy, EGD, gastric sleeve


Past Anesthesia/Blood Transfusion Reactions: Motion Sickness, Postoperative 

Nausea & Vomiting (PONV)


Past Psychological History: No Psychological Hx Reported


Smoking Status: Former smoker


Past Alcohol Use History: None Reported


Past Drug Use History: None Reported





- Past Family History


  ** Mother


Family Medical History: Blood Disorder, Pulmonary Embolus


Additional Family Medical History / Comment(s): mom has factor 2 also





General Exam





- General Exam Comments


Initial Comments: 


General:  The patient is awake and alert, in no distress, and does not appear 

acutely ill. 


Skin:  Skin is warm and dry and no rashes or lesions are noted. 


Eye:  Pupils are equal, round and reactive to light, extra-ocular movements are 

intact; there is normal conjunctiva bilaterally.  


Ears, nose, mouth and throat:  There are moist mucous membranes and no oral 

lesions. 


Neck:  The neck is supple, there is no tenderness  or JVD.  


Cardiovascular:  There is a regular rate and rhythm. No murmur, rub or gallop 

is appreciated.


Respiratory: To auscultation bilateral, no wheezing no rhonchi no distress  

respiratory wise noticed


Gastrointestinal:  Soft, non-distended, non-tender abdomen without masses or 

organomegaly noted. There is no rebound or guarding present. Bowel sounds are 

unremarkable. 


Back:  There is no tenderness to palpation in the midline. There is no obvious 

deformity.


Musculoskeletal:  Normal ROM, no tenderness, There is no pedal edema. There is 

no calf tenderness or swelling. No cords were appreciated.  


Neurological:  CN II-XII intact, Cranial nerves III through XII are intact. 

There are no obvious motor or sensory deficits. Coordination appears grossly 

intact. Speech is normal.


Psychiatric:  Cooperative, appropriate mood & affect, normal judgment.  








Limitations: no limitations





Course





 Vital Signs











  06/19/18 06/19/18 06/19/18





  17:39 17:50 18:49


 


Temperature 98.4 F  


 


Pulse Rate 53 L  62


 


Respiratory 18 18 18





Rate   


 


Blood Pressure 153/85  132/80


 


O2 Sat by Pulse 100  99





Oximetry   














  06/19/18 06/19/18 06/19/18





  20:03 21:00 22:07


 


Temperature   


 


Pulse Rate 55 L 58 L 63


 


Respiratory 18 18 18





Rate   


 


Blood Pressure 116/65 115/55 113/66


 


O2 Sat by Pulse 98 99 97





Oximetry   








EKG had multiple PVCs about Stopping 2 chest x-rays unremarkable troponin is 

negative d-dimer is within normal range INR is.  Fall is 2.9 with therapeutic, 

considering her chest pain palpitation and PVCs were observed for 24 hours and 

consult cardiology to rule out any ischemic heart disease, patient agreed with 

the patient be admitted to Dr. Hussein service and cardiology consult





Disposition


Clinical Impression: 


 Chest pain





Disposition: ADMITTED AS IP TO THIS HOSP


Condition: Good


Referrals: 


Diogenes Kimbrough Jr,  [Primary Care Provider] - 1-2 days

## 2018-06-20 VITALS — HEART RATE: 61 BPM | SYSTOLIC BLOOD PRESSURE: 143 MMHG | DIASTOLIC BLOOD PRESSURE: 72 MMHG | TEMPERATURE: 98.3 F

## 2018-06-20 VITALS — RESPIRATION RATE: 18 BRPM

## 2018-06-20 LAB
CHOLEST SERPL-MCNC: 137 MG/DL (ref ?–200)
CK SERPL-CCNC: 39 U/L (ref 30–135)
CK SERPL-CCNC: 42 U/L (ref 30–135)
HDLC SERPL-MCNC: 40 MG/DL (ref 40–60)
INR PPP: 2.7 (ref ?–1.2)
LDLC SERPL CALC-MCNC: 82 MG/DL (ref 0–99)
PT BLD: 24 SEC (ref 9–12)
T4 FREE SERPL-MCNC: 1.06 NG/DL (ref 0.78–2.19)
TRIGL SERPL-MCNC: 75 MG/DL (ref ?–150)
TROPONIN I SERPL-MCNC: <0.012 NG/ML (ref 0–0.03)
TROPONIN I SERPL-MCNC: <0.012 NG/ML (ref 0–0.03)

## 2018-06-20 RX ADMIN — NITROGLYCERIN SCH: 20 OINTMENT TOPICAL at 00:03

## 2018-06-20 RX ADMIN — NITROGLYCERIN SCH: 20 OINTMENT TOPICAL at 07:19

## 2018-06-20 RX ADMIN — GABAPENTIN SCH MG: 300 CAPSULE ORAL at 12:31

## 2018-06-20 RX ADMIN — GABAPENTIN SCH MG: 300 CAPSULE ORAL at 07:48

## 2018-06-20 NOTE — P.HPIM
History of Present Illness


H&P Date: 06/20/18


Chief Complaint: Palpitations, chest tightness, shortness of breath





THIS DOCUMENT SERVES AS H&P AND DISCHARGE SUMMARY





38-year-old  female who presented to the emergency room with a chief 

complaint of palpitations. The patient reports she began having palpitations 

intermittently about 2 weeks ago.  She reports on Monday she began having 

palpitations more frequently. Yesterday, she felt like she was having 

palpitations all day long. She reports associated chest tightness and shortness 

of breath when she feels the palpitations, but otherwise she denies chest 

tightness or shortness of breath.  She denies nausea or vomiting.  Denies fever 

or chills.  Denies lightheadedness or dizziness. 





The patient has a history of sleep apnea and uses a BiPAP machine, 

osteoarthritis, pulmonary embolism in 2003.  She was diagnosed with factor II 

mutation and is on coumadin for anticoagulation.  She is a former cigarette 

smoker and quit smoking 15 years ago after smoking less than a pack per day for 

10 years.





Chest x-ray: Negative for an acute process.





EKG: Sinus rhythm with frequent PVCs





Laboratory data: 


WBC 12.2.  Hemoglobin 13.4.  Platelet count 484.


Sodium 138.  Potassium 4.2.  BUN 10.  Creatinine 0.65.  Magnesium 1.9.


Troponins: Negative 3


Lipid panel: Triglycerides 75, cholesterol 137, LDL 82, HDL 40.


TSH: 1.550.  Free T4: 1.06


INR 2.9 on admission


D-dimer 0.21





The patient was admitted to the hospital under the care of Dr. Stromberg to the 

observation unit.  Consultations were placed to cardiology.








Review of Systems





GENERAL: Patient denies fever. Denies chills.


EYES: Denies blurred vision. Denies vision changes. Denies eye pain.


EARS, NOSE, MOUTH, & THROAT: Denies headache. Denies sore throat. Denies ear 

pain.


RESPIRATORY: Positive for shortness of breath when she feels palpitations.  

Currently denies shortness of breath.  Denies cough. Denies sputum production. 

Denies hemoptysis. 


CARDIOVASCULAR: Positive for palpitations 2 weeks, increasing in frequency.  

Positive for chest tightness when she feels palpitations.  Currently denies 

palpitations or chest tightness.  Denies chest pain or pressure. Denies 

arrhythmias.


GASTROINTESTINAL: Denies abdominal pain. Denies diarrhea. Denies constipation. 

Denies nausea. Denies vomiting. Denies heartburn. Denies blood in the stool. 


GENITOURINARY: Denies urinary frequency. Denies burning. Denies dysuria. Denies 

cloudy urine.  Denies blood in the urine.


MUSCULOSKELETAL:  Denies myalgias. Denies joint swelling. Denies decreased 

range of motion beyond patients baseline.


INTEGUMENTARY: Denies pruitis. Denies rash.


PSYCHIATRIC: Denies suicidal or homicial ideations.


ENDOCRINE: Denies weight change. Denies polydipsia. Denies polyuria.


HEMATOLOGIC: Positive for history of factor II mutation.








Past Medical History


Past Medical History: Blood Disorder, Musculoskeletal Disorder, Osteoarthritis (

OA), Pulmonary Embolus (PE), Sleep Apnea/CPAP/BIPAP


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 

2003, severe spondylosis of back,


History of Any Multi-Drug Resistant Organisms: None Reported


Past Surgical History: Bariatric Surgery, Tonsillectomy


Additional Past Surgical History / Comment(s): splenectomy, EGD, gastric sleeve


Past Anesthesia/Blood Transfusion Reactions: Motion Sickness, Postoperative 

Nausea & Vomiting (PONV)


Past Psychological History: No Psychological Hx Reported


Smoking Status: Former smoker


Past Alcohol Use History: None Reported


Additional Past Alcohol Use History / Comment(s): quit 15 years ago, smoked <

ppd for 10 yrs.


Past Drug Use History: None Reported





- Past Family History


  ** Mother


Family Medical History: Blood Disorder, Pulmonary Embolus


Additional Family Medical History / Comment(s): mom has factor 2 also





  ** Father


Family Medical History: Myocardial Infarction (MI)


Additional Family Medical History / Comment(s): passed at age 63





Medications and Allergies


 Home Medications











 Medication  Instructions  Recorded  Confirmed  Type


 


Gabapentin 600 mg PO BID@0800,1200 02/22/17 06/19/18 History


 


Gabapentin [Neurontin] 1,200 mg PO HS 03/10/17 06/19/18 History


 


HYDROcodone/APAP 7.5-325MG [Norco 1 tab PO TID 06/19/18 06/19/18 History





7.5-325]    


 


SUMAtriptan SUCCINATE [Imitrex] 25 mg PO DAILY PRN 06/19/18 06/19/18 History


 


Warfarin [Coumadin] 5 mg PO TUTH 06/19/18 06/19/18 History


 


Warfarin [Coumadin] 7.5 mg PO SUMOWEFRSA 06/19/18 06/19/18 History











 Allergies











Allergy/AdvReac Type Severity Reaction Status Date / Time


 


morphine AdvReac Severe Became Verified 06/19/18 18:37





   Unresponsive  














Physical Exam


Vitals: 


 Vital Signs











  Temp Pulse Pulse Resp BP BP Pulse Ox


 


 06/20/18 07:10  97.5 F L   59 L  18   104/51  98


 


 06/20/18 04:00  97.8 F   62  16   99/50  97


 


 06/20/18 03:13    50 L  16   


 


 06/19/18 23:39    54 L  16   


 


 06/19/18 23:27  97.7 F   58 L  16   136/79  97


 


 06/19/18 23:04  98.4 F  80   16  110/58   97


 


 06/19/18 22:07   63   18  113/66   97


 


 06/19/18 21:00   58 L   18  115/55   99


 


 06/19/18 20:03   55 L   18  116/65   98


 


 06/19/18 18:49   62   18  132/80   99


 


 06/19/18 17:50     18   


 


 06/19/18 17:39  98.4 F  53 L   18  153/85   100








 Intake and Output











 06/19/18 06/20/18 06/20/18





 22:59 06:59 14:59


 


Other:   


 


  Voiding Method  Toilet 


 


  # Voids 1  


 


  Weight 105.007 kg 105.007 kg 














GENERAL: This is a 38-year-old  female in no apparent distress at the 

time of examination. Pleasant and cooperative.


HEENT: Head is atraumatic, normocephalic. Pupils are equal, round, and reactive 

to light. Sclerae anicteric. Conjunctivae are clear. Mucus membranes of the 

mouth are moist. Neck is supple. 


RESPIRATORY: Clear to ausculation. No wheezes, rales, or rhonchi.  No use of 

accessory muscles.  Patient maintaining oxygen saturation greater than 92%. No 

chest wall tenderness is noted on palpation or with deep breathing.


CARDIOVASCULAR: Regular rate and rhythm. S1 and S2 noted.  No systolic or 

diastolic murmur auscultated.  No JVD noted. No S3 or S4 noted.


GASTROINTESTINAL: No distention noted.  Abdomen soft and round.  Normal active 

bowel sounds auscultated x 4 quadrants.  No pain or tenderness noted upon 

palpation.


INTEGUMENTARY: No cyanosis. No jaundice. No rashes noted. No cellulitis noted.


EXTREMITIES: 2+ peripheral pulses. No evidence of peripheral edema. No calf 

tenderness noted.


NEUROLOGIC:  Cranial nerves II-XII intact.


PSYCHIATRIC: Awake, alert, and oriented X 3. Appropriate affect. Intact 

judgement and insight.








Results


CBC & Chem 7: 


 06/19/18 18:20





 06/19/18 18:20


Labs: 


 Abnormal Lab Results - Last 24 Hours (Table)











  06/19/18 06/19/18 06/20/18 Range/Units





  18:20 18:20 06:29 


 


WBC  12.2 H    (3.8-10.6)  k/uL


 


Plt Count  484 H    (150-450)  k/uL


 


Lymphocytes #  6.6 H    (1.0-4.8)  k/uL


 


PT   26.1 H  24.0 H  (9.0-12.0)  sec


 


INR   2.9 H  2.7 H  (<1.2)  


 


APTT   31.0 H   (22.0-30.0)  sec














Thrombosis Risk Factor Assmnt





- Choose All That Apply


Each Factor Represents 1 point: Obesity (BMI >25)


Each Risk Factor Represents 3 Points: Positive Factor V Leiden


Thrombosis Risk Factor Assessment Total Risk Factor Score: 4


Thrombosis Risk Factor Assessment Level: Moderate Risk





Assessment and Plan


Plan: 





ASSESSMENT:





Palpitations 2 weeks, increasing in frequency with associated chest tightness 

and shortness of breath, EKG reveals SR with PVCs





History of pulmonary embolism in 2003, secondary to factor II mutation, 

maintained on long-term anticoagulation with Coumadin





History of sleep apnea





History of sleeve gastrectomy secondary to obesity





Obesity: BMI 36.3





History of nicotine dependence, patient smoked 1 pack per day for less than 10 

years and quit 15 years ago.





PLAN:





Case discussed with Dr. Sumner, cardiology.  Echocardiogram has been 

ordered.  Awaiting results.  If echocardiogram is normal, patient may be 

discharged home today with an event monitor for 30 days and then follow up with 

cardiology outpatient along with Dr. Kimbrough/Stromberg. 





Nurse practitioner note has been reviewed by physician. Signing provider agrees 

with the documented findings, assessment, and plan of care.

## 2018-06-20 NOTE — P.CRDCN
History of Present Illness


History of present illness: 


This is a pleasant 38-year-old  female past medical history 

significant for pulmonary embolism in 2003 currently maintained on Coumadin, 

sleep apnea with regular BiPAP use bariatric surgery.  She denies history of 

coronary artery disease and is never seen a cardiologist for any reason.  She 

has been feeling fluttering in her chest for the last few weeks. In the last 2 

days it has intensified immensely causing shortness of breath, fatigue and 

tightness in her chest. Denies nausea, vomiting, diaphoresis or dizziness. No 

radiation of pain to the arm, back, neck or jaw. 





EKG reveals sinus mechanism with frequent PVCs noted.  No acute ST or T-wave 

abnormalities.  Telemetry tracings have been unremarkable and indicate sinus 

mechanism.  Repeat EKG this morning reveals sinus bradycardia heart rate in the 

50s with no evidence of PVCs.


Chest x-ray is negative for an acute cardiopulmonary process.


Laboratory data reviewed, hemoglobin 13.4, platelets 484, WBC 12.2, INR 2.9, d-

dimer 0.21, sodium 138, potassium 4.2, magnesium 1.9, creatinine 0.65, cardiac 

enzymes negative 3, LDL 82, HDL 40, TSH 1.55 with a free T4 4 0.06.


Current medications include gabapentin, Imitrex, Coumadin and Norco.





Review of Systems





At the time of my exam:


CONSTITUTIONAL: Denies fever. Denies chills.


EYES: Denies blurred vision. Denies vision changes. Denies eye pain.


EARS, NOSE, MOUTH & THROAT: Denies headache. Denies sore throat. Denies ear 

pain.


CARDIOVASCULAR: Denies chest pain. Denies shortness of breath. Denies 

orthopnea. Denies PND. Denies palpitations.


RESPIRATORY: Denies cough. 


GASTROINTESTINAL: Denies abdominal pain. Denies diarrhea. Denies constipation. 

Denies nausea. Denies vomiting.


MUSCULOSKELETAL: Denies myalgias.


INTEGUMENTARY: Denies pruitis. Denies rash.


NEUROLOGIC: Denies numbness. Denies tingling. Denies weakness.


PSYCHIATRIC: Denies anxiety. Denies depression.


ENDOCRINE: Denies fatigue. Denies weight change. Denies polydipsia. Denies 

polyurina.


GENITOURINARY: Denies burning, hematuria or urgency with micturation.


HEMATOLOGIC: Denies history of anemia. Denies bleeding. 








Past Medical History


Past Medical History: Blood Disorder, Musculoskeletal Disorder, Osteoarthritis (

OA), Pulmonary Embolus (PE), Sleep Apnea/CPAP/BIPAP


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 

2003, severe spondylosis of back,


History of Any Multi-Drug Resistant Organisms: None Reported


Past Surgical History: Bariatric Surgery, Tonsillectomy


Additional Past Surgical History / Comment(s): splenectomy, EGD, gastric sleeve


Past Anesthesia/Blood Transfusion Reactions: Motion Sickness, Postoperative 

Nausea & Vomiting (PONV)


Past Psychological History: No Psychological Hx Reported


Smoking Status: Former smoker


Past Alcohol Use History: None Reported


Additional Past Alcohol Use History / Comment(s): quit 15 years ago, smoked <

ppd for 10 yrs.


Past Drug Use History: None Reported





- Past Family History


  ** Mother


Family Medical History: Blood Disorder, Pulmonary Embolus


Additional Family Medical History / Comment(s): mom has factor 2 also





  ** Father


Family Medical History: Myocardial Infarction (MI)


Additional Family Medical History / Comment(s): passed at age 63





Medications and Allergies


 Home Medications











 Medication  Instructions  Recorded  Confirmed  Type


 


Gabapentin 600 mg PO BID@0800,1200 02/22/17 06/19/18 History


 


Gabapentin [Neurontin] 1,200 mg PO HS 03/10/17 06/19/18 History


 


HYDROcodone/APAP 7.5-325MG [Norco 1 tab PO TID 06/19/18 06/19/18 History





7.5-325]    


 


SUMAtriptan SUCCINATE [Imitrex] 25 mg PO DAILY PRN 06/19/18 06/19/18 History


 


Warfarin [Coumadin] 5 mg PO TUTH 06/19/18 06/19/18 History


 


Warfarin [Coumadin] 7.5 mg PO SUMOWEFRSA 06/19/18 06/19/18 History











 Allergies











Allergy/AdvReac Type Severity Reaction Status Date / Time


 


morphine AdvReac Severe Became Verified 06/19/18 18:37





   Unresponsive  














Physical Exam


Vitals: 


 Vital Signs











  Temp Pulse Pulse Resp BP BP Pulse Ox


 


 06/20/18 04:00  97.8 F   62  16   99/50  97


 


 06/20/18 03:13    50 L  16   


 


 06/19/18 23:39    54 L  16   


 


 06/19/18 23:27  97.7 F   58 L  16   136/79  97


 


 06/19/18 23:04  98.4 F  80   16  110/58   97


 


 06/19/18 22:07   63   18  113/66   97


 


 06/19/18 21:00   58 L   18  115/55   99


 


 06/19/18 20:03   55 L   18  116/65   98


 


 06/19/18 18:49   62   18  132/80   99


 


 06/19/18 17:50     18   


 


 06/19/18 17:39  98.4 F  53 L   18  153/85   100








 Intake and Output











 06/19/18 06/20/18 06/20/18





 22:59 06:59 14:59


 


Other:   


 


  Voiding Method  Toilet 


 


  # Voids 1  


 


  Weight 105.007 kg 105.007 kg 














Blood pressure 99/50 heart rate 62 afebrile maintaining oxygen saturation on 

room air


GENERAL: This is a 38-year-old  female in no apparent distress at the 

time of my examination.  Obese.


HEENT: Head is atraumatic, normocephalic. Pupils are equal, round. Sclerae 

anicteric. Conjunctivae are clear. Mucous membranes of the mouth are moist. 

Neck is supple. There is no jugular venous distention. No carotid bruit is 

heard.


LUNGS: Clear to auscultation no wheezes, rales or rhonchi. No chest wall 

tenderness is noted on palpation or with deep breathing.


HEART: Regular rate and rhythm without murmurs, rubs or gallops. S1 and S2 

heard.


ABDOMEN: Soft, nontender. Bowel sounds are heard. No organomegaly noted.


EXTREMITIES: No evidence of peripheral edema and no calf tenderness noted.


VASCULAR: Radial and dorsalis pedis pulses palpated, no evidence of clubbing.  


NEUROLOGIC: Patient is awake, alert and oriented x3.


 








Results





 06/19/18 18:20





 06/19/18 18:20


 Cardiac Enzymes











  06/19/18 06/19/18 06/19/18 Range/Units





  18:20 18:20 23:29 


 


AST   28   (14-36)  U/L


 


CK-MB (CK-2)  <0.2   <0.2  (0.0-2.4)  ng/mL


 


Troponin I  <0.012   <0.012  (0.000-0.034)  ng/mL








 Coagulation











  06/19/18 Range/Units





  18:20 


 


PT  26.1 H  (9.0-12.0)  sec


 


APTT  31.0 H  (22.0-30.0)  sec








 CBC











  06/19/18 Range/Units





  18:20 


 


WBC  12.2 H  (3.8-10.6)  k/uL


 


RBC  4.61  (3.80-5.40)  m/uL


 


Hgb  13.4  (11.4-16.0)  gm/dL


 


Hct  41.3  (34.0-46.0)  %


 


Plt Count  484 H  (150-450)  k/uL








 Comprehensive Metabolic Panel











  06/19/18 Range/Units





  18:20 


 


Sodium  138  (137-145)  mmol/L


 


Potassium  4.2  (3.5-5.1)  mmol/L


 


Chloride  100  ()  mmol/L


 


Carbon Dioxide  29  (22-30)  mmol/L


 


BUN  10  (7-17)  mg/dL


 


Creatinine  0.65  (0.52-1.04)  mg/dL


 


Glucose  83  (74-99)  mg/dL


 


Calcium  9.7  (8.4-10.2)  mg/dL


 


AST  28  (14-36)  U/L


 


ALT  38  (9-52)  U/L


 


Alkaline Phosphatase  58  ()  U/L


 


Total Protein  7.0  (6.3-8.2)  g/dL


 


Albumin  3.9  (3.5-5.0)  g/dL








 Current Medications











Generic Name Dose Route Start Last Admin





  Trade Name Freq  PRN Reason Stop Dose Admin


 


Diphenhydramine HCl  50 mg  06/20/18 00:00  06/20/18 01:15





  Benadryl  PO   50 mg





  HS PRN   Administration





  Insomnia   


 


Gabapentin  600 mg  06/20/18 08:00  





  Neurontin  PO   





  BID@0800,1200 [place]   


 


Gabapentin  1,200 mg  06/19/18 23:00  06/20/18 00:41





  Neurontin  PO   1,200 mg





  HS [place]   Administration


 


Hydromorphone HCl  0.5 mg  06/19/18 22:32  





  Dilaudid  IVP   





  Q3HR PRN   





  Pain   


 


Sumatriptan Succinate  25 mg  06/19/18 22:32  





  Imitrex  PO   





  DAILY PRN   





  Migraine Headache   


 


Warfarin Sodium  7.5 mg  06/20/18 18:00  





  Coumadin  PO   





  SuMoWeFrSa@1800 [place]   


 


Warfarin Sodium  5 mg  06/19/18 23:27  





  Coumadin  PO   





  TuTh@1800 [facility]   








 Intake and Output











 06/19/18 06/20/18 06/20/18





 22:59 06:59 14:59


 


Other:   


 


  Voiding Method  Toilet 


 


  # Voids 1  


 


  Weight 105.007 kg 105.007 kg 








 





 06/19/18 18:20 





 06/19/18 18:20 











Assessment and Plan


Assessment: 





ASSESSMENT


1.  Palpitations causing shortness of breath and chest discomfort, frequent PVCs


2.  History of pulmonary embolism on long-term anticoagulation secondary to 

factor II deficiency


3.  Obesity





PLAN


Obtain 2-D echocardiogram and Doppler study to assess cardiac structure and 

function.


TSH and free T4 have been added on this morning and are normal.


Vitamin B12 level had been elevated in the past we'll repeat today.  She states 

she is still taking this vitamin supplementation.


Event monitor for 30 days. 


Follow up with Dr. Bates in 5-6 weeks. 


Thank you kindly for this consultation. 





Nurse Practitioner note has been reviewed, I agree with a documented findings 

and plan of care.  Patient was seen and examined.

## 2018-06-20 NOTE — ECHOF
Referral Reason:sob



MEASUREMENTS

--------

HEIGHT: 170.2 cm

WEIGHT: 104.3 kg

BP: 136/79

RVIDd:   2.4 cm     (< 3.3)

IVSd:   1.2 cm     (0.6 - 1.1)

LVIDd:   4.4 cm     (3.9 - 5.3)

LVPWd:   1.3 cm     (0.6 - 1.1)

IVSs:   1.7 cm

LVIDs:   3.0 cm

LVPWs:   1.4 cm

LA Diam:   3.2 cm     (2.7 - 3.8)

LAESV Index (A-L):   32.58 ml/m

Ao Diam:   3.0 cm     (2.0 - 3.7)

AV Cusp:   2.2 cm     (1.5 - 2.6)

LA Diam:   4.1 cm     (2.7 - 3.8)

MV EXCURSION:   11.800 mm     (> 18.000)

MV EF SLOPE:   91 mm/s     (70 - 150)

EPSS:   0.5 cm

MV E Rigoberto:   0.94 m/s

MV DecT:   162 ms

MV A Rigoberto:   0.59 m/s

MV E/A Ratio:   1.59 

RAP:   5.00 mmHg

RVSP:   23.32 mmHg







FINDINGS

--------

Sinus rhythm.

This was a technically good study.

The left ventricular size is normal.   There is borderline concentric left ventricular hypertrophy.  
 Overall left ventricular systolic function is low-normal with, an EF between 50 - 55 %.

The right ventricle is normal in size.

The left atrial size is normal.   LA is midly dilated 29-33ml/m2.

The right atrial size is normal.

The aortic valve is trileaflet, and appears structurally normal. No aortic stenosis or regurgitation.


Mild mitral annular calcification present.   Mild mitral regurgitation is present.

Mild tricuspid regurgitation present.   There is no evidence of pulmonary hypertension.   The right v
entricular systolic pressure, as measured by Doppler, is 23.32mmHg.

Trace/mild (physiologic)  pulmonic regurgitation.

The aortic root size is normal.

There is no pericardial effusion.



CONCLUSIONS

--------

1. The left ventricular size is normal.

2. There is borderline concentric left ventricular hypertrophy.

3. Overall left ventricular systolic function is low-normal with, an EF between 50 - 55 %.

4. The right ventricle is normal in size.

5. The left atrial size is normal.

6. LA is midly dilated 29-33ml/m2.

7. The right atrial size is normal.

8. The aortic valve is trileaflet, and appears structurally normal. No aortic stenosis or regurgitati
on.

9. Mild mitral annular calcification present.

10. Mild mitral regurgitation is present.

11. Mild tricuspid regurgitation present.

12. There is no evidence of pulmonary hypertension.

13. The right ventricular systolic pressure, as measured by Doppler, is 23.32mmHg.

14. Trace/mild (physiologic)  pulmonic regurgitation.

15. The aortic root size is normal.

16. There is no pericardial effusion.





SONOGRAPHER: Argenis Marino RDCS

## 2018-06-22 ENCOUNTER — HOSPITAL ENCOUNTER (OUTPATIENT)
Dept: HOSPITAL 47 - EC | Age: 38
Setting detail: OBSERVATION
LOS: 1 days | Discharge: HOME | End: 2018-06-23
Payer: COMMERCIAL

## 2018-06-22 VITALS — BODY MASS INDEX: 35.3 KG/M2

## 2018-06-22 VITALS — RESPIRATION RATE: 16 BRPM

## 2018-06-22 DIAGNOSIS — Z98.84: ICD-10-CM

## 2018-06-22 DIAGNOSIS — Z88.5: ICD-10-CM

## 2018-06-22 DIAGNOSIS — R06.02: ICD-10-CM

## 2018-06-22 DIAGNOSIS — R00.2: Primary | ICD-10-CM

## 2018-06-22 DIAGNOSIS — Z82.49: ICD-10-CM

## 2018-06-22 DIAGNOSIS — Z90.81: ICD-10-CM

## 2018-06-22 DIAGNOSIS — Z86.711: ICD-10-CM

## 2018-06-22 DIAGNOSIS — D68.2: ICD-10-CM

## 2018-06-22 DIAGNOSIS — M47.819: ICD-10-CM

## 2018-06-22 DIAGNOSIS — Z79.01: ICD-10-CM

## 2018-06-22 DIAGNOSIS — E66.9: ICD-10-CM

## 2018-06-22 DIAGNOSIS — Z79.899: ICD-10-CM

## 2018-06-22 DIAGNOSIS — M19.90: ICD-10-CM

## 2018-06-22 DIAGNOSIS — Z87.891: ICD-10-CM

## 2018-06-22 DIAGNOSIS — G47.33: ICD-10-CM

## 2018-06-22 DIAGNOSIS — Z83.2: ICD-10-CM

## 2018-06-22 DIAGNOSIS — R55: ICD-10-CM

## 2018-06-22 DIAGNOSIS — R07.89: ICD-10-CM

## 2018-06-22 DIAGNOSIS — I49.3: ICD-10-CM

## 2018-06-22 DIAGNOSIS — Z79.891: ICD-10-CM

## 2018-06-22 LAB
ALBUMIN SERPL-MCNC: 4.1 G/DL (ref 3.5–5)
ALP SERPL-CCNC: 58 U/L (ref 38–126)
ALT SERPL-CCNC: 42 U/L (ref 9–52)
ANION GAP SERPL CALC-SCNC: 9 MMOL/L
APTT BLD: 28.1 SEC (ref 22–30)
AST SERPL-CCNC: 37 U/L (ref 14–36)
BASOPHILS # BLD AUTO: 0.1 K/UL (ref 0–0.2)
BASOPHILS NFR BLD AUTO: 1 %
BUN SERPL-SCNC: 8 MG/DL (ref 7–17)
CALCIUM SPEC-MCNC: 9.7 MG/DL (ref 8.4–10.2)
CHLORIDE SERPL-SCNC: 102 MMOL/L (ref 98–107)
CK SERPL-CCNC: 36 U/L (ref 30–135)
CK SERPL-CCNC: 38 U/L (ref 30–135)
CO2 SERPL-SCNC: 28 MMOL/L (ref 22–30)
D DIMER PPP FEU-MCNC: 0.27 MG/L FEU (ref ?–0.6)
EOSINOPHIL # BLD AUTO: 0.2 K/UL (ref 0–0.7)
EOSINOPHIL NFR BLD AUTO: 2 %
ERYTHROCYTE [DISTWIDTH] IN BLOOD BY AUTOMATED COUNT: 4.71 M/UL (ref 3.8–5.4)
ERYTHROCYTE [DISTWIDTH] IN BLOOD: 13.2 % (ref 11.5–15.5)
GLUCOSE SERPL-MCNC: 88 MG/DL (ref 74–99)
HCT VFR BLD AUTO: 42.2 % (ref 34–46)
HGB BLD-MCNC: 13.9 GM/DL (ref 11.4–16)
INR PPP: 2.2 (ref ?–1.2)
LYMPHOCYTES # SPEC AUTO: 3.8 K/UL (ref 1–4.8)
LYMPHOCYTES NFR SPEC AUTO: 41 %
MAGNESIUM SPEC-SCNC: 1.9 MG/DL (ref 1.6–2.3)
MCH RBC QN AUTO: 29.4 PG (ref 25–35)
MCHC RBC AUTO-ENTMCNC: 32.9 G/DL (ref 31–37)
MCV RBC AUTO: 89.6 FL (ref 80–100)
MONOCYTES # BLD AUTO: 0.8 K/UL (ref 0–1)
MONOCYTES NFR BLD AUTO: 9 %
NEUTROPHILS # BLD AUTO: 4.2 K/UL (ref 1.3–7.7)
NEUTROPHILS NFR BLD AUTO: 45 %
PLATELET # BLD AUTO: 462 K/UL (ref 150–450)
POTASSIUM SERPL-SCNC: 4.5 MMOL/L (ref 3.5–5.1)
PROT SERPL-MCNC: 7.1 G/DL (ref 6.3–8.2)
PT BLD: 19.5 SEC (ref 9–12)
SODIUM SERPL-SCNC: 139 MMOL/L (ref 137–145)
TROPONIN I SERPL-MCNC: <0.012 NG/ML (ref 0–0.03)
TROPONIN I SERPL-MCNC: <0.012 NG/ML (ref 0–0.03)
WBC # BLD AUTO: 9.3 K/UL (ref 3.8–10.6)

## 2018-06-22 PROCEDURE — 83735 ASSAY OF MAGNESIUM: CPT

## 2018-06-22 PROCEDURE — 80053 COMPREHEN METABOLIC PANEL: CPT

## 2018-06-22 PROCEDURE — 82550 ASSAY OF CK (CPK): CPT

## 2018-06-22 PROCEDURE — 84484 ASSAY OF TROPONIN QUANT: CPT

## 2018-06-22 PROCEDURE — 71046 X-RAY EXAM CHEST 2 VIEWS: CPT

## 2018-06-22 PROCEDURE — 85730 THROMBOPLASTIN TIME PARTIAL: CPT

## 2018-06-22 PROCEDURE — 82553 CREATINE MB FRACTION: CPT

## 2018-06-22 PROCEDURE — 93005 ELECTROCARDIOGRAM TRACING: CPT

## 2018-06-22 PROCEDURE — 36415 COLL VENOUS BLD VENIPUNCTURE: CPT

## 2018-06-22 PROCEDURE — 85610 PROTHROMBIN TIME: CPT

## 2018-06-22 PROCEDURE — 94760 N-INVAS EAR/PLS OXIMETRY 1: CPT

## 2018-06-22 PROCEDURE — 99285 EMERGENCY DEPT VISIT HI MDM: CPT

## 2018-06-22 PROCEDURE — 85025 COMPLETE CBC W/AUTO DIFF WBC: CPT

## 2018-06-22 PROCEDURE — 85379 FIBRIN DEGRADATION QUANT: CPT

## 2018-06-22 RX ADMIN — CEFAZOLIN SCH: 330 INJECTION, POWDER, FOR SOLUTION INTRAMUSCULAR; INTRAVENOUS at 23:25

## 2018-06-22 RX ADMIN — CEFAZOLIN SCH MLS/HR: 330 INJECTION, POWDER, FOR SOLUTION INTRAMUSCULAR; INTRAVENOUS at 14:30

## 2018-06-22 RX ADMIN — HYDROCODONE BITARTRATE AND ACETAMINOPHEN SCH EACH: 7.5; 325 TABLET ORAL at 16:46

## 2018-06-22 RX ADMIN — HYDROCODONE BITARTRATE AND ACETAMINOPHEN SCH EACH: 7.5; 325 TABLET ORAL at 22:01

## 2018-06-22 NOTE — XR
EXAMINATION TYPE: XR chest 2V

 

DATE OF EXAM: 6/22/2018

 

COMPARISON: 6/19/2018

 

TECHNIQUE: PA and lateral views submitted.

 

HISTORY: Chest pain

 

FINDINGS:

The lungs are clear and  there is no pneumothorax, pleural effusion, or focal pneumonia.  Hypertrophi
c and degenerative change of the spine noted.

 

IMPRESSION: 

1. No acute process.

## 2018-06-22 NOTE — ED
General Adult HPI





- General


Chief complaint: Chest Pain


Stated complaint: Chest pain


Time Seen by Provider: 06/22/18 12:06


Source: patient, family, RN notes reviewed, old records reviewed


Mode of arrival: wheelchair


Limitations: no limitations





- History of Present Illness


Initial comments: 





Chief complaint and history of present illness is a 38-year-old female here 

with her significant other.  The patient was in hospital several days ago with 

similar complaints as she's had today but she reports today's complaints for 

worse.  Patient describes chest pressure shortness of breath and sweats patient 

felt as though she was about to pass out and a fluttery heart sensation.  The 

patient has a Holter monitor on this to be investigated.





- Related Data


 Home Medications











 Medication  Instructions  Recorded  Confirmed


 


Gabapentin 600 mg PO BID@0800,1200 02/22/17 06/22/18


 


Gabapentin [Neurontin] 1,200 mg PO HS 03/10/17 06/22/18


 


HYDROcodone/APAP 7.5-325MG [Norco 1 tab PO TID 06/19/18 06/22/18





7.5-325]   


 


SUMAtriptan SUCCINATE [Imitrex] 25 mg PO DAILY PRN 06/19/18 06/22/18


 


Warfarin [Coumadin] 5 mg PO TUTH 06/19/18 06/22/18


 


Warfarin [Coumadin] 7.5 mg PO SUMOWEFRSA 06/19/18 06/22/18


 


Ferrous Sulfate [Feosol] 325 mg PO DAILY 06/22/18 06/22/18


 


Multivitamins, Thera [Multivitamin 1 tab PO DAILY 06/22/18 06/22/18





(formulary)]   











 Allergies











Allergy/AdvReac Type Severity Reaction Status Date / Time


 


morphine AdvReac Severe Became Verified 06/22/18 12:25





   Unresponsive  














Review of Systems


ROS Statement: 


Those systems with pertinent positive or pertinent negative responses have been 

documented in the HPI.


Review of systems.  Currently the patient reports she still has a mild chest 

pressure.  No sweats no dizziness no nausea.  Denying headache shortness of 

breath of significance, no nausea no vomiting no abdominal pain no neuro 

deficits.  All systems were reviewed.


Past medical problems significant for a blood disorder factor to disorder.  She 

is on Coumadin since 2003.  Patient has had osteoarthritis pulmonary embolism 

acting 2003 which started the Coumadin as well.  Also sleep apnea.  The patient'

s surgeries include bariatric surgery tonsillectomy splenectomy EGD.  Patient's 

family history noncontributory she has ALLERGIES to morphine.  Nonsmoker 

nondrinker.





ROS Other: All systems not noted in ROS Statement are negative.





Past Medical History


Past Medical History: Blood Disorder, Musculoskeletal Disorder, Osteoarthritis (

OA), Pulmonary Embolus (PE), Sleep Apnea/CPAP/BIPAP


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 

2003, severe spondylosis of back,


History of Any Multi-Drug Resistant Organisms: None Reported


Past Surgical History: Bariatric Surgery, Tonsillectomy


Additional Past Surgical History / Comment(s): splenectomy, EGD, gastric sleeve


Past Anesthesia/Blood Transfusion Reactions: Motion Sickness, Postoperative 

Nausea & Vomiting (PONV)


Past Psychological History: No Psychological Hx Reported


Smoking Status: Former smoker


Past Alcohol Use History: None Reported


Past Drug Use History: None Reported





- Past Family History


  ** Mother


Family Medical History: Blood Disorder, Pulmonary Embolus


Additional Family Medical History / Comment(s): mom has factor 2 also





  ** Father


Family Medical History: Myocardial Infarction (MI)


Additional Family Medical History / Comment(s): passed at age 63





General Exam





- General Exam Comments


Initial Comments: 





General:


The patient is awake and alert, here because of palpitations, chest heaviness, 

sweats and near syncope.  Lasting for the past one hour.  Vital signs temp 98.6 

pulse 60 respiratory rate 20 pulse ox 99% room air blood pressure 149/86


Eye:


Pupils are equal, round and reactive to light, extra-ocular movements are intact

; there is normal conjunctiva bilaterally. No signs of icterus. 


Ears, nose, mouth and throat:


There are moist mucous membranes and no oral lesions. 


Neck:


The neck is supple, there is no tenderness.


Cardiovascular:


Regular rhythm, bradycardic rate 58.. No murmur, rub or gallop is appreciated.


Respiratory:


Lungs are clear to auscultation, respirations are non-labored, breath sounds 

are equal. No wheezes, stridor, rales, or rhonchi.  Subjective sensation of 

shortness of breath


Gastrointestinal:


Soft, non-distended, non-tender abdomen without masses or organomegaly noted. 

There is no rebound or guarding present. No CVA tenderness. Bowel sounds are 

unremarkable.


Back:


There is no tenderness to palpation in the midline. There is no obvious 

deformity. No rashes noted. 


Musculoskeletal:


Normal ROM, no tenderness, There is no pedal edema. There is no calf tenderness 

or swelling. Sensation intact. Pulses equal bilaterally 2+. 


Neurological:


CN II-XII intact, There are no obvious motor or sensory deficits. Coordination 

appears grossly intact. Speech is normal.


Skin:


Skin is warm and dry and no rashes or lesions are noted. 


Psychiatric:


Cooperative,  


Limitations: no limitations





Course


 Vital Signs











  06/22/18





  11:39


 


Temperature 98.6 F


 


Pulse Rate 60


 


Respiratory 20





Rate 


 


Blood Pressure 149/86


 


O2 Sat by Pulse 99





Oximetry 














EKG Findings





- EKG Comments:


EKG Findings:: EKG was done and reviewed at 1136 showing sinus bradycardia 

otherwise no acute ST elevation no ectopy no ischemic changes.  Rate 56 DE 

interval is 132 QRS 80  QTc 43.  Dr. Hay.  This EKG was compared to one 

done on 06/19/2018 and they are similar in general morphology but less ectopic 

activity.





Medical Decision Making





- Medical Decision Making





Medical decision making; this is a 38-year-old female here with his significant 

other.  The patient can emergency room because of having had one hour with 

chest pressure, shortness of breath and sweats.  She reports she nearly passed 

out.  The patient was in the hospital just several days ago similar symptoms.  

She does have a Holter monitor on.  This will be interrogated.  Labs today were 

white count of 9 hemoglobin 13.9 hematocrit of 42 with a potassium 4.5.  

Patient is on Coumadin and her INR is 2.2.  D dimer normal at 0.27.  The patient

's BUNs 8 creatinine 0.5 GFR greater than 90.  Glucose 88 the troponin less 

than 0.012.





Chest x-ray is done AP and lateral view and reviewed by radiologist his 

findings are the lungs are clear and there is no pneumothorax, pleural effusion

, or focal pneumonia.  Hypertrophic degenerative change of the spine noted.  

Impression no acute process.  As read by Dr. Walker








The case was discussed with her attending patient be admitted with cardiology 

consultation.




















- Lab Data


Result diagrams: 


 06/22/18 12:20





 06/22/18 12:20


 Lab Results











  06/22/18 06/22/18 06/22/18 Range/Units





  12:20 12:20 12:20 


 


WBC   9.3   (3.8-10.6)  k/uL


 


RBC   4.71   (3.80-5.40)  m/uL


 


Hgb   13.9   (11.4-16.0)  gm/dL


 


Hct   42.2   (34.0-46.0)  %


 


MCV   89.6   (80.0-100.0)  fL


 


MCH   29.4   (25.0-35.0)  pg


 


MCHC   32.9   (31.0-37.0)  g/dL


 


RDW   13.2   (11.5-15.5)  %


 


Plt Count   462 H   (150-450)  k/uL


 


Neutrophils %   45   %


 


Lymphocytes %   41   %


 


Monocytes %   9   %


 


Eosinophils %   2   %


 


Basophils %   1   %


 


Neutrophils #   4.2   (1.3-7.7)  k/uL


 


Lymphocytes #   3.8   (1.0-4.8)  k/uL


 


Monocytes #   0.8   (0-1.0)  k/uL


 


Eosinophils #   0.2   (0-0.7)  k/uL


 


Basophils #   0.1   (0-0.2)  k/uL


 


PT     (9.0-12.0)  sec


 


INR     (<1.2)  


 


APTT     (22.0-30.0)  sec


 


D-Dimer     (<0.60)  mg/L FEU


 


Sodium    139  (137-145)  mmol/L


 


Potassium    4.5  (3.5-5.1)  mmol/L


 


Chloride    102  ()  mmol/L


 


Carbon Dioxide    28  (22-30)  mmol/L


 


Anion Gap    9  mmol/L


 


BUN    8  (7-17)  mg/dL


 


Creatinine    0.59  (0.52-1.04)  mg/dL


 


Est GFR (CKD-EPI)AfAm    >90  (>60 ml/min/1.73 sqM)  


 


Est GFR (CKD-EPI)NonAf    >90  (>60 ml/min/1.73 sqM)  


 


Glucose    88  (74-99)  mg/dL


 


Calcium    9.7  (8.4-10.2)  mg/dL


 


Magnesium    1.9  (1.6-2.3)  mg/dL


 


Total Bilirubin    0.5  (0.2-1.3)  mg/dL


 


AST    37 H  (14-36)  U/L


 


ALT    42  (9-52)  U/L


 


Alkaline Phosphatase    58  ()  U/L


 


Total Creatine Kinase  38    ()  U/L


 


CK-MB (CK-2)  <0.2    (0.0-2.4)  ng/mL


 


CK-MB (CK-2) Rel Index      


 


Troponin I  <0.012    (0.000-0.034)  ng/mL


 


Total Protein    7.1  (6.3-8.2)  g/dL


 


Albumin    4.1  (3.5-5.0)  g/dL














  06/22/18 Range/Units





  12:20 


 


WBC   (3.8-10.6)  k/uL


 


RBC   (3.80-5.40)  m/uL


 


Hgb   (11.4-16.0)  gm/dL


 


Hct   (34.0-46.0)  %


 


MCV   (80.0-100.0)  fL


 


MCH   (25.0-35.0)  pg


 


MCHC   (31.0-37.0)  g/dL


 


RDW   (11.5-15.5)  %


 


Plt Count   (150-450)  k/uL


 


Neutrophils %   %


 


Lymphocytes %   %


 


Monocytes %   %


 


Eosinophils %   %


 


Basophils %   %


 


Neutrophils #   (1.3-7.7)  k/uL


 


Lymphocytes #   (1.0-4.8)  k/uL


 


Monocytes #   (0-1.0)  k/uL


 


Eosinophils #   (0-0.7)  k/uL


 


Basophils #   (0-0.2)  k/uL


 


PT  19.5 H  (9.0-12.0)  sec


 


INR  2.2 H  (<1.2)  


 


APTT  28.1  (22.0-30.0)  sec


 


D-Dimer  0.27  (<0.60)  mg/L FEU


 


Sodium   (137-145)  mmol/L


 


Potassium   (3.5-5.1)  mmol/L


 


Chloride   ()  mmol/L


 


Carbon Dioxide   (22-30)  mmol/L


 


Anion Gap   mmol/L


 


BUN   (7-17)  mg/dL


 


Creatinine   (0.52-1.04)  mg/dL


 


Est GFR (CKD-EPI)AfAm   (>60 ml/min/1.73 sqM)  


 


Est GFR (CKD-EPI)NonAf   (>60 ml/min/1.73 sqM)  


 


Glucose   (74-99)  mg/dL


 


Calcium   (8.4-10.2)  mg/dL


 


Magnesium   (1.6-2.3)  mg/dL


 


Total Bilirubin   (0.2-1.3)  mg/dL


 


AST   (14-36)  U/L


 


ALT   (9-52)  U/L


 


Alkaline Phosphatase   ()  U/L


 


Total Creatine Kinase   ()  U/L


 


CK-MB (CK-2)   (0.0-2.4)  ng/mL


 


CK-MB (CK-2) Rel Index   


 


Troponin I   (0.000-0.034)  ng/mL


 


Total Protein   (6.3-8.2)  g/dL


 


Albumin   (3.5-5.0)  g/dL














Disposition


Clinical Impression: 


 Heart palpitations, Near syncope





Disposition: ADMITTED AS IP TO THIS [facility]


Condition: Serious


Referrals: 


Diogenes Kimbrough Jr,  [Primary Care Provider] - 1-2 days

## 2018-06-23 VITALS — DIASTOLIC BLOOD PRESSURE: 53 MMHG | TEMPERATURE: 98.4 F | HEART RATE: 55 BPM | SYSTOLIC BLOOD PRESSURE: 105 MMHG

## 2018-06-23 LAB
CK SERPL-CCNC: 35 U/L (ref 30–135)
TROPONIN I SERPL-MCNC: <0.012 NG/ML (ref 0–0.03)

## 2018-06-23 RX ADMIN — GABAPENTIN SCH MG: 300 CAPSULE ORAL at 14:51

## 2018-06-23 RX ADMIN — GABAPENTIN SCH MG: 300 CAPSULE ORAL at 09:15

## 2018-06-23 RX ADMIN — HYDROCODONE BITARTRATE AND ACETAMINOPHEN SCH EACH: 7.5; 325 TABLET ORAL at 09:14

## 2018-06-23 NOTE — P.DS
Providers


Date of admission: 


06/22/18 13:48





Expected date of discharge: 06/23/18


Attending physician: 


Reid Stromberg





Consults: 





 





06/22/18 13:48


Consult Physician Stat 


   Consulting Provider: Joshua Mauricio


   Consult Reason/Comments: Symptomatic palpitations, near-syncope


   Do you want consulting provider notified?: Yes











Primary care physician: 


Diogenes Kimbrough








- Discharge Diagnosis(es)


(1) Obstructive sleep apnea


Current Visit: Yes   Status: Acute   





(2) History of pulmonary embolus (PE)


Current Visit: Yes   Status: Acute   





(3) Long term current use of anticoagulant


Current Visit: Yes   Status: Acute   





(4) History of bariatric surgery


Current Visit: Yes   Status: Acute   





(5) Heart palpitations


Current Visit: Yes   Status: Acute   





(6) Near syncope


Current Visit: Yes   Status: Acute   





(7) Chest pain


Current Visit: No   Status: Acute   


Hospital Course: 








This is a 38-year-old  female who presented to the emergency room with 

a chief complaint of palpitations, chest pressure or shortness of breath. The 

patient reports she began having palpitations intermittently about 2 weeks ago.

  She reports on Monday she began having palpitations more frequently. Tuesday, 

she felt like she was having palpitations all day long. She reports associated 

chest tightness and shortness of breath when she feels the palpitations, but 

otherwise she denies chest tightness or shortness of breath.  She denies nausea 

or vomiting.  Denies fever or chills.  Denies lightheadedness or dizziness. 


She was admitted on 6/20/2018 [facility], and evaluated by cardiology and 

discharged home with a telemetry monitor.  He reports recurrence of symptoms 

one day ago, she became short of breath and had chest pressures symptoms seem 

more severe and the episode lasted about 10 minutes.  She said she was better 

by the time she came emergency room.  Initial labs were negative.  Troponin 3 

negative.





The patient has a history of sleep apnea and uses a BiPAP machine, 

osteoarthritis, pulmonary embolism in 2003.  She was diagnosed with factor II 

mutation and is on coumadin for anticoagulation.  She is a former cigarette 

smoker and quit smoking 15 years ago after smoking less than a pack per day for 

10 years.





She does use some caffeine products.  He also takes vitamins to help with her 

history of sleeve gastrectomy secondary to her obesity.





She was seen by cardiology.  They started on verapamil 40 mg BID.  She is 

cleared for discharge after tolerating this medication.  She will refrain from 

caffeinated beverage use and B12 at this time.











Patient Condition at Discharge: Serious





Plan - Discharge Summary


Discharge Rx Participant: No


New Discharge Prescriptions: 


New


   Verapamil [Isoptin] 40 mg PO BID #60 tab





Continue


   Gabapentin 600 mg PO BID@0800,1200


   Gabapentin [Neurontin] 1,200 mg PO HS


   Warfarin [Coumadin] 5 mg PO TUTH


   Warfarin [Coumadin] 7.5 mg PO SUMOWEFRSA


   SUMAtriptan SUCCINATE [Imitrex] 25 mg PO DAILY PRN


     PRN Reason: Migraine Headache


   HYDROcodone/APAP 7.5-325MG [[place] 7.5-325] 1 tab PO TID


   Multivitamins, Thera [Multivitamin (formulary)] 1 tab PO DAILY


   Ferrous Sulfate [Iron (65 MG Elemental)] 325 mg PO DAILY


Discharge Medication List





Gabapentin 600 mg PO BID@0800,1200 02/22/17 [History]


Gabapentin [Neurontin] 1,200 mg PO HS 03/10/17 [History]


HYDROcodone/APAP 7.5-325MG [[place] 7.5-325] 1 tab PO TID 06/19/18 [History]


SUMAtriptan SUCCINATE [Imitrex] 25 mg PO DAILY PRN 06/19/18 [History]


Warfarin [Coumadin] 5 mg PO TUTH 06/19/18 [History]


Warfarin [Coumadin] 7.5 mg PO SUMOWEFRSA 06/19/18 [History]


Ferrous Sulfate [Iron (65 MG Elemental)] 325 mg PO DAILY 06/22/18 [History]


Multivitamins, Thera [Multivitamin (formulary)] 1 tab PO DAILY 06/22/18 [History

]


Verapamil [Isoptin] 40 mg PO BID #60 tab 06/23/18 [Rx]








Follow up Appointment(s)/Referral(s): 


Diogenes Kimbrough Jr, DO [Primary Care Provider] - 1-2 days


Kamran Bates MD [STAFF PHYSICIAN] - 4 Weeks


Discharge Disposition: HOME SELF-CARE

## 2018-06-23 NOTE — P.CRDCN
History of Present Illness


History of present illness: 


This is a pleasant 38-year-old  female past medical history 

significant for pulmonary embolism on long-term anticoagulation with Coumadin 

and sleep apnea.  She denies history of coronary artery disease and is never 

seen a cardiologist for any reason.  We have been asked to see her in 

consultation for complaints of palpitations.  She was here in the hospital on 

Wednesday with similar symptoms and noted to have frequent PVCs on telemetry.  

At that time she was given an event monitor to her home.  She is currently 

wearing the event monitor as recommended.  She states the palpitations started 

yesterday morning and were very intense.  She describes fluttering feeling in 

her chest associated with shortness of breath and tightness in her chest.  

These episodes last approximately 5-10 minutes in duration.  There is no 

specific aggravating or alleviating factors that she can recognize.  Last 

admission her vitamin B12 level was seen to be over 1400.  She has since 

stopped taking vitamin B supplementation.  Thyroid function was normal at that 

time.  Echocardiogram obtained reveals preserved left ventricular systolic 

function with ejection fraction 50-55%, mildly dilated left atrium.





EKG reveals sinus bradycardia heart rate 56 no PVCs noted on initial EKG no 

acute ST or T wave abnormalities noted.  Telemetry indicates frequent PVCs 

sometimes trigeminy although not sustained.


Chest x-ray reveals no acute cardiopulmonary process.


Laboratory data reviewed hemoglobin 13.9, platelets 462, INR 2.2, d-dimer 0.27, 

sodium 139, potassium 4.5, magnesium 1.9, creatinine 0.59, cardiac enzymes 

negative 3.


Current medications include ferrous sulfate, gabapentin, Norco, multivitamin, 

Imitrex and Coumadin.





At the time of my exam:


CONSTITUTIONAL: Denies fever. Denies chills.


EYES: Denies blurred vision. Denies vision changes. Denies eye pain.


EARS, NOSE, MOUTH & THROAT: Denies headache. Denies sore throat. Denies ear 

pain.


CARDIOVASCULAR: Denies chest pain. Denies shortness of breath. Denies 

orthopnea. Denies PND. Denies palpitations.


RESPIRATORY: Denies cough. 


GASTROINTESTINAL: Denies abdominal pain. Denies diarrhea. Denies constipation. 

Denies nausea. Denies vomiting.


MUSCULOSKELETAL: Denies myalgias.


INTEGUMENTARY: Denies pruitis. Denies rash.


NEUROLOGIC: Denies numbness. Denies tingling. Denies weakness.


PSYCHIATRIC: Denies anxiety. Denies depression.


ENDOCRINE: Denies fatigue. Denies weight change. Denies polydipsia. Denies 

polyurina.


GENITOURINARY: Denies burning, hematuria or urgency with micturation.


HEMATOLOGIC: Denies history of anemia. Denies bleeding. 





Blood pressure 110/57 heart rate 52 afebrile maintaining oxygen saturation on 

room air


GENERAL: This is a 38-year-old occasion female in no apparent distress at the 

time of my examination.  Obese.


HEENT: Head is atraumatic, normocephalic. Pupils are equal, round. Sclerae 

anicteric. Conjunctivae are clear. Mucous membranes of the mouth are moist. 

Neck is supple. There is no jugular venous distention. No carotid bruit is 

heard.


LUNGS: Clear to auscultation no wheezes, rales or rhonchi. No chest wall 

tenderness is noted on palpation or with deep breathing.


HEART: Regular rate and rhythm without murmurs, rubs or gallops. S1 and S2 

heard.


ABDOMEN: Soft, nontender. Bowel sounds are heard. No organomegaly noted.


EXTREMITIES: No evidence of peripheral edema and no calf tenderness noted.


VASCULAR: Radial and dorsalis pedis pulses palpated, no evidence of clubbing.  


NEUROLOGIC: Patient is awake, alert and oriented x3.





ASSESSMENT


1.  Palpitations with frequent PVCs noted on telemetry tracings.


2.  History of pulmonary embolism on long-term anticoagulation with Coumadin


3.  Obstructive sleep apnea


4.  Elevated level of B12 secondary to increased oral intake of supplements





PLAN


Again we have advised to discontinue all B12 vitamin supplementations.


Add verapamil 40 mg BID. Give first dose now and monitor blood pressure. If she 

tolerates she can go home this afternoon. 


Follow up with Dr. Bates as scheduled previously. 


Thank you kindly for this consultation. 





Nurse Practitioner note has been reviewed, I agree with a documented findings 

and plan of care.  Patient was seen and examined.





 


 





Past Medical History


Past Medical History: Blood Disorder, Musculoskeletal Disorder, Osteoarthritis (

OA), Pulmonary Embolus (PE), Sleep Apnea/CPAP/BIPAP


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 

in 2003, severe spondylosis of back,


History of Any Multi-Drug Resistant Organisms: None Reported


Past Surgical History: Bariatric Surgery, Tonsillectomy


Additional Past Surgical History / Comment(s): splenectomy, EGD, gastric sleeve


Past Anesthesia/Blood Transfusion Reactions: Motion Sickness, Postoperative 

Nausea & Vomiting (PONV)


Past Psychological History: No Psychological Hx Reported


Smoking Status: Former smoker


Past Alcohol Use History: None Reported


Additional Past Alcohol Use History / Comment(s): quit 15 years ago, smoked <

ppd for 10 yrs.


Past Drug Use History: None Reported





- Past Family History


  ** Mother


Family Medical History: Blood Disorder, Pulmonary Embolus


Additional Family Medical History / Comment(s): mom has factor 2 also





  ** Father


Family Medical History: Myocardial Infarction (MI)


Additional Family Medical History / Comment(s): passed at age 63





Medications and Allergies


 Home Medications











 Medication  Instructions  Recorded  Confirmed  Type


 


Gabapentin 600 mg PO BID@0800,1200 02/22/17 06/22/18 History


 


Gabapentin [Neurontin] 1,200 mg PO HS 03/10/17 06/22/18 History


 


HYDROcodone/APAP 7.5-325MG [Norco 1 tab PO TID 06/19/18 06/22/18 History





7.5-325]    


 


SUMAtriptan SUCCINATE [Imitrex] 25 mg PO DAILY PRN 06/19/18 06/22/18 History


 


Warfarin [Coumadin] 5 mg PO TUTH 06/19/18 06/22/18 History


 


Warfarin [Coumadin] 7.5 mg PO SUMOWEFRSA 06/19/18 06/22/18 History


 


Ferrous Sulfate [Feosol] 325 mg PO DAILY 06/22/18 06/22/18 History


 


Multivitamins, Thera [Multivitamin 1 tab PO DAILY 06/22/18 06/22/18 History





(formulary)]    











 Allergies











Allergy/AdvReac Type Severity Reaction Status Date / Time


 


morphine AdvReac Severe Became Verified 06/22/18 12:25





   Unresponsive  














Physical Exam


Vitals: 


 Vital Signs











  Temp Pulse Pulse Resp BP BP Pulse Ox


 


 06/23/18 04:00  98.1 F   52 L  16   96/50  97


 


 06/23/18 00:00    62  16   


 


 06/22/18 23:44  98.8 F   67  16   119/63  97


 


 06/22/18 20:51  98.7 F   66  16   113/53  97


 


 06/22/18 20:00    68  16   


 


 06/22/18 16:00     18   


 


 06/22/18 15:04        96


 


 06/22/18 15:01  98.6 F   67  18   138/84  98


 


 06/22/18 14:39  98.7 F  69   18  119/56   100


 


 06/22/18 13:00   62   18  132/66   100


 


 06/22/18 12:42   58 L   18  121/73   100


 


 06/22/18 11:42   54 L   18  117/57   99


 


 06/22/18 11:39  98.6 F  60   20  149/86   99








 Intake and Output











 06/22/18 06/23/18 06/23/18





 22:59 06:59 14:59


 


Intake Total 200  


 


Balance 200  


 


Intake:   


 


  Oral 200  


 


Other:   


 


  Voiding Method Toilet Toilet 


 


  Weight 102.3 kg  














Results





 06/22/18 12:20





 06/22/18 12:20


 Cardiac Enzymes











  06/22/18 06/22/18 06/22/18 Range/Units





  12:20 12:20 18:37 


 


AST   37 H   (14-36)  U/L


 


CK-MB (CK-2)  <0.2   <0.2  (0.0-2.4)  ng/mL


 


Troponin I  <0.012   <0.012  (0.000-0.034)  ng/mL














  06/23/18 Range/Units





  00:15 


 


AST   (14-36)  U/L


 


CK-MB (CK-2)  <0.2  (0.0-2.4)  ng/mL


 


Troponin I  <0.012  (0.000-0.034)  ng/mL








 Coagulation











  06/22/18 Range/Units





  12:20 


 


PT  19.5 H  (9.0-12.0)  sec


 


APTT  28.1  (22.0-30.0)  sec








 CBC











  06/22/18 Range/Units





  12:20 


 


WBC  9.3  (3.8-10.6)  k/uL


 


RBC  4.71  (3.80-5.40)  m/uL


 


Hgb  13.9  (11.4-16.0)  gm/dL


 


Hct  42.2  (34.0-46.0)  %


 


Plt Count  462 H  (150-450)  k/uL








 Comprehensive Metabolic Panel











  06/22/18 Range/Units





  12:20 


 


Sodium  139  (137-145)  mmol/L


 


Potassium  4.5  (3.5-5.1)  mmol/L


 


Chloride  102  ()  mmol/L


 


Carbon Dioxide  28  (22-30)  mmol/L


 


BUN  8  (7-17)  mg/dL


 


Creatinine  0.59  (0.52-1.04)  mg/dL


 


Glucose  88  (74-99)  mg/dL


 


Calcium  9.7  (8.4-10.2)  mg/dL


 


AST  37 H  (14-36)  U/L


 


ALT  42  (9-52)  U/L


 


Alkaline Phosphatase  58  ()  U/L


 


Total Protein  7.1  (6.3-8.2)  g/dL


 


Albumin  4.1  (3.5-5.0)  g/dL








 Current Medications











Generic Name Dose Route Start Last Admin





  Trade Name Freq  PRN Reason Stop Dose Admin


 


Acetaminophen  650 mg  06/22/18 13:48  





  Tylenol Tab  PO   





  Q6HR PRN   





  Mild Pain or Fever > 100.5   


 


Hydrocodone Bitart/Acetaminophen  1 each  06/22/18 16:00  06/22/18 22:01





  [place] 7.5-325  PO   1 each





  TID [facility]   Administration


 


Ferrous Sulfate  325 mg  06/23/18 09:00  





  Feosol  PO   





  DAILY [facility]   


 


Gabapentin  600 mg  06/23/18 08:00  





  Neurontin  PO   





  BID@0800,1200 [facility]   


 


Gabapentin  1,200 mg  06/22/18 21:00  06/22/18 19:59





  Neurontin  PO   1,200 mg





  HS [facility]   Administration


 


Sodium Chloride  1,000 mls @ 80 mls/hr  06/22/18 14:00  06/22/18 23:25





  Saline 0.9%  IV   Not Given





  [id number] [facility]   


 


Multivitamins  1 each  06/23/18 09:00  





  Theragran  PO   





  DAILY [facility]   


 


Naloxone HCl  0.2 mg  06/22/18 13:48  





  Narcan  IV   





  Q2M PRN   





  Opioid Reversal   


 


Sumatriptan Succinate  25 mg  06/22/18 13:51  





  Imitrex  PO   





  DAILY PRN   





  Migraine Headache   


 


Warfarin Sodium  5 mg  06/26/18 18:00  





  Coumadin  PO   





  TuTh@1800 [facility]   


 


Warfarin Sodium  7.5 mg  06/22/18 18:00  06/22/18 17:40





  Coumadin  PO   7.5 mg





  SuMoWeFrSa@1800 [facility]   Administration








 Intake and Output











 06/22/18 06/23/18 06/23/18





 22:59 06:59 14:59


 


Intake Total 200  


 


Balance 200  


 


Intake:   


 


  Oral 200  


 


Other:   


 


  Voiding Method Toilet Toilet 


 


  Weight 102.3 kg  








 





 06/22/18 12:20 





 06/22/18 12:20

## 2018-06-23 NOTE — P.CRDCN
History of Present Illness


History of present illness: 





Impression and reviewed 


Stenting with fluttering in the chest which makes her dizzy and lightheaded.  

Intermittent PVCs noted, not very frequent but the patient says she is very 

symptomatic with him.  Suggest continue telemetry monitoring start verapamil 40 

mg twice daily watch blood pressure continue to live in ponder which has 

already been prescribed and follow-up with Dr. Bates as an outpatient.  

Patient will be discharged within the next 12-24 hours








Past Medical History


Past Medical History: Blood Disorder, Musculoskeletal Disorder, Osteoarthritis (

OA), Pulmonary Embolus (PE), Sleep Apnea/CPAP/BIPAP


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 

in 2003, severe spondylosis of back,


History of Any Multi-Drug Resistant Organisms: None Reported


Past Surgical History: Bariatric Surgery, Tonsillectomy


Additional Past Surgical History / Comment(s): splenectomy, EGD, gastric sleeve


Past Anesthesia/Blood Transfusion Reactions: Motion Sickness, Postoperative 

Nausea & Vomiting (PONV)


Past Psychological History: No Psychological Hx Reported


Smoking Status: Former smoker


Past Alcohol Use History: None Reported


Additional Past Alcohol Use History / Comment(s): quit 15 years ago, smoked <

ppd for 10 yrs.


Past Drug Use History: None Reported





- Past Family History


  ** Mother


Family Medical History: Blood Disorder, Pulmonary Embolus


Additional Family Medical History / Comment(s): mom has factor 2 also





  ** Father


Family Medical History: Myocardial Infarction (MI)


Additional Family Medical History / Comment(s): passed at age 63





Medications and Allergies


 Home Medications











 Medication  Instructions  Recorded  Confirmed  Type


 


Gabapentin 600 mg PO BID@0800,1200 02/22/17 06/22/18 History


 


Gabapentin [Neurontin] 1,200 mg PO HS 03/10/17 06/22/18 History


 


HYDROcodone/APAP 7.5-325MG [Norco 1 tab PO TID 06/19/18 06/22/18 History





7.5-325]    


 


SUMAtriptan SUCCINATE [Imitrex] 25 mg PO DAILY PRN 06/19/18 06/22/18 History


 


Warfarin [Coumadin] 5 mg PO TUTH 06/19/18 06/22/18 History


 


Warfarin [Coumadin] 7.5 mg PO SUMOWEFRSA 06/19/18 06/22/18 History


 


Ferrous Sulfate [Feosol] 325 mg PO DAILY 06/22/18 06/22/18 History


 


Multivitamins, Thera [Multivitamin 1 tab PO DAILY 06/22/18 06/22/18 History





(formulary)]    











 Allergies











Allergy/AdvReac Type Severity Reaction Status Date / Time


 


morphine AdvReac Severe Became Verified 06/22/18 12:25





   Unresponsive  














Physical Exam


Vitals: 


 Vital Signs











  Temp Pulse Pulse Resp BP BP Pulse Ox


 


 06/23/18 08:00  98.2 F   52 L  16   110/57  95


 


 06/23/18 04:00  98.1 F   52 L  16   96/50  97


 


 06/23/18 00:00    62  16   


 


 06/22/18 23:44  98.8 F   67  16   119/63  97


 


 06/22/18 20:51  98.7 F   66  16   113/53  97


 


 06/22/18 20:00    68  16   


 


 06/22/18 16:00     18   


 


 06/22/18 15:04        96


 


 06/22/18 15:01  98.6 F   67  18   138/84  98


 


 06/22/18 14:39  98.7 F  69   18  119/56   100


 


 06/22/18 13:00   62   18  132/66   100


 


 06/22/18 12:42   58 L   18  121/73   100


 


 06/22/18 11:42   54 L   18  117/57   99


 


 06/22/18 11:39  98.6 F  60   20  149/86   99








 Intake and Output











 06/22/18 06/23/18 06/23/18





 22:59 06:59 14:59


 


Intake Total 200  


 


Balance 200  


 


Intake:   


 


  Oral 200  


 


Other:   


 


  Voiding Method Toilet Toilet Toilet


 


  Weight 102.3 kg  














Results





 06/22/18 12:20





 06/22/18 12:20


 Cardiac Enzymes











  06/22/18 06/22/18 06/22/18 Range/Units





  12:20 12:20 18:37 


 


AST   37 H   (14-36)  U/L


 


CK-MB (CK-2)  <0.2   <0.2  (0.0-2.4)  ng/mL


 


Troponin I  <0.012   <0.012  (0.000-0.034)  ng/mL














  06/23/18 Range/Units





  00:15 


 


AST   (14-36)  U/L


 


CK-MB (CK-2)  <0.2  (0.0-2.4)  ng/mL


 


Troponin I  <0.012  (0.000-0.034)  ng/mL








 Coagulation











  06/22/18 Range/Units





  12:20 


 


PT  19.5 H  (9.0-12.0)  sec


 


APTT  28.1  (22.0-30.0)  sec








 CBC











  06/22/18 Range/Units





  12:20 


 


WBC  9.3  (3.8-10.6)  k/uL


 


RBC  4.71  (3.80-5.40)  m/uL


 


Hgb  13.9  (11.4-16.0)  gm/dL


 


Hct  42.2  (34.0-46.0)  %


 


Plt Count  462 H  (150-450)  k/uL








 Comprehensive Metabolic Panel











  06/22/18 Range/Units





  12:20 


 


Sodium  139  (137-145)  mmol/L


 


Potassium  4.5  (3.5-5.1)  mmol/L


 


Chloride  102  ()  mmol/L


 


Carbon Dioxide  28  (22-30)  mmol/L


 


BUN  8  (7-17)  mg/dL


 


Creatinine  0.59  (0.52-1.04)  mg/dL


 


Glucose  88  (74-99)  mg/dL


 


Calcium  9.7  (8.4-10.2)  mg/dL


 


AST  37 H  (14-36)  U/L


 


ALT  42  (9-52)  U/L


 


Alkaline Phosphatase  58  ()  U/L


 


Total Protein  7.1  (6.3-8.2)  g/dL


 


Albumin  4.1  (3.5-5.0)  g/dL








 Current Medications











Generic Name Dose Route Start Last Admin





  Trade Name Freq  PRN Reason Stop Dose Admin


 


Acetaminophen  650 mg  06/22/18 13:48  





  Tylenol Tab  PO   





  Q6HR PRN   





  Mild Pain or Fever > 100.5   


 


Hydrocodone Bitart/Acetaminophen  1 each  06/22/18 16:00  06/23/18 09:14





  [place] 7.5-325  PO   1 each





  TID [facility]   Administration


 


Ferrous Sulfate  325 mg  06/23/18 09:00  06/23/18 09:15





  Feosol  PO   325 mg





  DAILY [facility]   Administration


 


Gabapentin  600 mg  06/23/18 08:00  06/23/18 09:15





  Neurontin  PO   600 mg





  BID@0800,1200 [facility]   Administration


 


Gabapentin  1,200 mg  06/22/18 21:00  06/22/18 19:59





  Neurontin  PO   1,200 mg





  HS [facility]   Administration


 


Sodium Chloride  1,000 mls @ 80 mls/hr  06/22/18 14:00  06/22/18 23:25





  Saline 0.9%  IV   Not Given





  [id number] [facility]   


 


Multivitamins  1 each  06/23/18 09:00  06/23/18 09:14





  Theragran  PO   1 each





  DAILY [facility]   Administration


 


Naloxone HCl  0.2 mg  06/22/18 13:48  





  Narcan  IV   





  Q2M PRN   





  Opioid Reversal   


 


Sumatriptan Succinate  25 mg  06/22/18 13:51  





  Imitrex  PO   





  DAILY PRN   





  Migraine Headache   


 


Verapamil HCl  40 mg  06/23/18 10:45  





  Isoptin  PO   





  BID [facility]   


 


Warfarin Sodium  5 mg  06/26/18 18:00  





  Coumadin  PO   





  TuTh@1800 [facility]   


 


Warfarin Sodium  7.5 mg  06/22/18 18:00  06/22/18 17:40





  Coumadin  PO   7.5 mg





  SuMoWeFrSa@1800 [facility]   Administration








 Intake and Output











 06/22/18 06/23/18 06/23/18





 22:59 06:59 14:59


 


Intake Total 200  


 


Balance 200  


 


Intake:   


 


  Oral 200  


 


Other:   


 


  Voiding Method Toilet Toilet Toilet


 


  Weight 102.3 kg  








 





 06/22/18 12:20 





 06/22/18 12:20

## 2018-06-23 NOTE — P.HPIM
History of Present Illness


H&P Date: 06/23/18


Chief Complaint: Palpitations, chest pressure and shortness of breath





This is a 38-year-old  female who presented to the emergency room with 

a chief complaint of palpitations, chest pressure or shortness of breath. The 

patient reports she began having palpitations intermittently about 2 weeks ago.

  She reports on Monday she began having palpitations more frequently. Tuesday, 

she felt like she was having palpitations all day long. She reports associated 

chest tightness and shortness of breath when she feels the palpitations, but 

otherwise she denies chest tightness or shortness of breath.  She denies nausea 

or vomiting.  Denies fever or chills.  Denies lightheadedness or dizziness. 


She was admitted on 6/20/2018 fomiles this, and evaluated by cardiology and 

discharged home with a telemetry monitor.  He reports recurrence of symptoms 

one day ago, she became short of breath and had chest pressures symptoms seem 

more severe and the episode lasted about 10 minutes.  She said she was better 

by the time she came emergency room.  Initial labs were negative.  Troponin 3 

negative.





The patient has a history of sleep apnea and uses a BiPAP machine, 

osteoarthritis, pulmonary embolism in 2003.  She was diagnosed with factor II 

mutation and is on coumadin for anticoagulation.  She is a former cigarette 

smoker and quit smoking 15 years ago after smoking less than a pack per day for 

10 years.





She does use some caffeine products.  He also takes vitamins to help with her 

history of sleeve gastrectomy secondary to her obesity.











Review of Systems


All systems: negative





Past Medical History


Past Medical History: Blood Disorder, Musculoskeletal Disorder, Osteoarthritis (

OA), Pulmonary Embolus (PE), Sleep Apnea/CPAP/BIPAP


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 

in 2003, severe spondylosis of back,


History of Any Multi-Drug Resistant Organisms: None Reported


Past Surgical History: Bariatric Surgery, Tonsillectomy


Additional Past Surgical History / Comment(s): splenectomy, EGD, gastric sleeve


Past Anesthesia/Blood Transfusion Reactions: Motion Sickness, Postoperative 

Nausea & Vomiting (PONV)


Past Psychological History: No Psychological Hx Reported


Smoking Status: Former smoker


Past Alcohol Use History: None Reported


Additional Past Alcohol Use History / Comment(s): quit 15 years ago, smoked <

ppd for 10 yrs.


Past Drug Use History: None Reported





- Past Family History


  ** Mother


Family Medical History: Blood Disorder, Pulmonary Embolus


Additional Family Medical History / Comment(s): mom has factor 2 also





  ** Father


Family Medical History: Myocardial Infarction (MI)


Additional Family Medical History / Comment(s): passed at age 63





Medications and Allergies


 Home Medications











 Medication  Instructions  Recorded  Confirmed  Type


 


Gabapentin 600 mg PO BID@0800,1200 02/22/17 06/22/18 History


 


Gabapentin [Neurontin] 1,200 mg PO HS 03/10/17 06/22/18 History


 


HYDROcodone/APAP 7.5-325MG [Norco 1 tab PO TID 06/19/18 06/22/18 History





7.5-325]    


 


SUMAtriptan SUCCINATE [Imitrex] 25 mg PO DAILY PRN 06/19/18 06/22/18 History


 


Warfarin [Coumadin] 5 mg PO TUTH 06/19/18 06/22/18 History


 


Warfarin [Coumadin] 7.5 mg PO SUMOWEFRSA 06/19/18 06/22/18 History


 


Ferrous Sulfate [Feosol] 325 mg PO DAILY 06/22/18 06/22/18 History


 


Multivitamins, Thera [Multivitamin 1 tab PO DAILY 06/22/18 06/22/18 History





(formulary)]    











 Allergies











Allergy/AdvReac Type Severity Reaction Status Date / Time


 


morphine AdvReac Severe Became Verified 06/22/18 12:25





   Unresponsive  














Physical Exam


Vitals: 


 Vital Signs











  Temp Pulse Pulse Resp BP BP Pulse Ox


 


 06/23/18 08:00  98.2 F   52 L  16   110/57  95


 


 06/23/18 04:00  98.1 F   52 L  16   96/50  97


 


 06/23/18 00:00    62  16   


 


 06/22/18 23:44  98.8 F   67  16   119/63  97


 


 06/22/18 20:51  98.7 F   66  16   113/53  97


 


 06/22/18 20:00    68  16   


 


 06/22/18 16:00     18   


 


 06/22/18 15:04        96


 


 06/22/18 15:01  98.6 F   67  18   138/84  98


 


 06/22/18 14:39  98.7 F  69   18  119/56   100


 


 06/22/18 13:00   62   18  132/66   100


 


 06/22/18 12:42   58 L   18  121/73   100








 Intake and Output











 06/22/18 06/23/18 06/23/18





 22:59 06:59 14:59


 


Intake Total 200  


 


Balance 200  


 


Intake:   


 


  Oral 200  


 


Other:   


 


  Voiding Method Toilet Toilet Toilet


 


  Weight 102.3 kg  














GENERAL: Well-appearing, well-nourished and in no acute distress.


HEAD: Atraumatic, normocephalic.


EYES: Pupils equal round and reactive to light, extraocular movements intact, 

sclera anicteric, conjunctiva are normal.


ENT:nares patent, oropharynx clear without exudates.  Moist mucous membranes.


NECK: Normal range of motion, supple without lymphadenopathy or JVD, no 

thyromegaly


LUNGS: Breath sounds clear to auscultation bilaterally and equal.  No wheezes 

rales or rhonchi.


HEART: Regular rate and rhythm without murmurs, rubs or gallops.S1S2 Normal


ABDOMEN: Soft, nontender, normoactive bowel sounds.  No guarding, no rebound.  

No masses appreciated.


EXTREMITIES: Normal range of motion, no pitting or edema.  No clubbing or 

cyanosis.


NEUROLOGICAL: Cranial nerves II through XII grossly intact.  Normal speech, 

normal gait.


PSYCH: Normal mood, normal affect.


SKIN: Warm, Dry, normal turgor, no rashes or lesions noted.





Results


CBC & Chem 7: 


 06/22/18 12:20





 06/22/18 12:20


Labs: 


 Abnormal Lab Results - Last 24 Hours (Table)











  06/22/18 06/22/18 06/22/18 Range/Units





  12:20 12:20 12:20 


 


Plt Count  462 H    (150-450)  k/uL


 


PT    19.5 H  (9.0-12.0)  sec


 


INR    2.2 H  (<1.2)  


 


AST   37 H   (14-36)  U/L














Thrombosis Risk Factor Assmnt





- DVT/VTE Prophylaxis


DVT/VTE Prophylaxis: Mechanical Prophylaxis ordered





- Choose All That Apply


Each Factor Represents 1 point: Obesity (BMI >25)


Each Risk Factor Represents 3 Points: Positive Factor V Leiden


Thrombosis Risk Factor Assessment Total Risk Factor Score: 4


Thrombosis Risk Factor Assessment Level: Moderate Risk





Assessment and Plan


(1) Obstructive sleep apnea


Current Visit: Yes   Status: Acute   Code(s): G47.33 - OBSTRUCTIVE SLEEP APNEA (

ADULT) (PEDIATRIC)   SNOMED Code(s): 91695835


   





(2) History of pulmonary embolus (PE)


Current Visit: Yes   Status: Acute   Code(s): Z86.711 - PERSONAL HISTORY OF 

PULMONARY EMBOLISM   SNOMED Code(s): 747454239


   





(3) Long term current use of anticoagulant


Current Visit: Yes   Status: Acute   Code(s): Z79.01 - LONG TERM (CURRENT) USE 

OF ANTICOAGULANTS   SNOMED Code(s): 806895810


   





(4) History of bariatric surgery


Current Visit: Yes   Status: Acute   Code(s): Z98.84 - BARIATRIC SURGERY STATUS

   SNOMED Code(s): 517418789


   





(5) Heart palpitations


Current Visit: Yes   Status: Acute   Code(s): R00.2 - PALPITATIONS   SNOMED Code

(s): 23879458


   





(6) Near syncope


Current Visit: Yes   Status: Acute   Code(s): R55 - SYNCOPE AND COLLAPSE   

SNOMED Code(s): 923962997


   





(7) Chest pain


Current Visit: No   Status: Acute   Code(s): R07.9 - CHEST PAIN, UNSPECIFIED   

SNOMED Code(s): 40897406


   


Plan: 


She'll remain on telemetry, awaiting a cardiology consultation.  She'll refrain 

from using B12 as her levels are very high.  She will refrain from using 

caffeinated beverages of any type this may be contributing to her symptoms.


She will be reevaluated in the next 12-24 hours he started her cardiology 

consultation.  If they're agreeable, she may be discharged home today.

## 2019-09-09 ENCOUNTER — HOSPITAL ENCOUNTER (OUTPATIENT)
Dept: HOSPITAL 47 - RADXRMAIN | Age: 39
Discharge: HOME | End: 2019-09-09
Attending: FAMILY MEDICINE
Payer: COMMERCIAL

## 2019-09-09 DIAGNOSIS — J20.9: Primary | ICD-10-CM

## 2019-09-09 LAB
ALBUMIN SERPL-MCNC: 4 G/DL (ref 3.5–5)
ALP SERPL-CCNC: 70 U/L (ref 38–126)
ALT SERPL-CCNC: 29 U/L (ref 9–52)
ANION GAP SERPL CALC-SCNC: 6 MMOL/L
AST SERPL-CCNC: 30 U/L (ref 14–36)
BUN SERPL-SCNC: 10 MG/DL (ref 7–17)
CALCIUM SPEC-MCNC: 9.4 MG/DL (ref 8.4–10.2)
CELLS COUNTED: 100
CHLORIDE SERPL-SCNC: 105 MMOL/L (ref 98–107)
CO2 SERPL-SCNC: 30 MMOL/L (ref 22–30)
ERYTHROCYTE [DISTWIDTH] IN BLOOD BY AUTOMATED COUNT: 4.97 M/UL (ref 3.8–5.4)
ERYTHROCYTE [DISTWIDTH] IN BLOOD: 13 % (ref 11.5–15.5)
GLUCOSE SERPL-MCNC: 88 MG/DL (ref 74–99)
HCT VFR BLD AUTO: 45 % (ref 34–46)
HGB BLD-MCNC: 14.8 GM/DL (ref 11.4–16)
INR PPP: 3.8 (ref ?–1.2)
LYMPHOCYTES # BLD MANUAL: 5.19 K/UL (ref 1–4.8)
MCH RBC QN AUTO: 29.7 PG (ref 25–35)
MCHC RBC AUTO-ENTMCNC: 32.8 G/DL (ref 31–37)
MCV RBC AUTO: 90.6 FL (ref 80–100)
MONOCYTES # BLD MANUAL: 0.95 K/UL (ref 0–1)
NEUTROPHILS NFR BLD MANUAL: 42 %
PLATELET # BLD AUTO: 380 K/UL (ref 150–450)
POTASSIUM SERPL-SCNC: 4.1 MMOL/L (ref 3.5–5.1)
PROT SERPL-MCNC: 7.5 G/DL (ref 6.3–8.2)
PT BLD: 36.3 SEC (ref 9–12)
SODIUM SERPL-SCNC: 141 MMOL/L (ref 137–145)
WBC # BLD AUTO: 10.6 K/UL (ref 3.8–10.6)

## 2019-09-09 PROCEDURE — 71046 X-RAY EXAM CHEST 2 VIEWS: CPT

## 2019-09-09 PROCEDURE — 36415 COLL VENOUS BLD VENIPUNCTURE: CPT

## 2019-09-09 PROCEDURE — 85610 PROTHROMBIN TIME: CPT

## 2019-09-09 PROCEDURE — 85025 COMPLETE CBC W/AUTO DIFF WBC: CPT

## 2019-09-09 PROCEDURE — 80053 COMPREHEN METABOLIC PANEL: CPT

## 2019-09-09 NOTE — XR
EXAMINATION TYPE: XR chest 2V

 

DATE OF EXAM: 9/9/2019

 

COMPARISON: 6/22/2018

 

TECHNIQUE: PA and lateral views submitted.

 

HISTORY: Cough and shortness of breath

 

FINDINGS:

The lungs are clear and  there is no pneumothorax, pleural effusion, or focal pneumonia.  No overt fa
ilure. Hypertrophic change of the spine.

 

IMPRESSION: 

1. No acute process.

## 2019-10-30 ENCOUNTER — HOSPITAL ENCOUNTER (OUTPATIENT)
Dept: HOSPITAL 47 - BARWHC3 | Age: 39
End: 2019-10-30
Payer: COMMERCIAL

## 2019-10-30 VITALS
TEMPERATURE: 98.1 F | HEART RATE: 63 BPM | SYSTOLIC BLOOD PRESSURE: 143 MMHG | RESPIRATION RATE: 16 BRPM | DIASTOLIC BLOOD PRESSURE: 91 MMHG

## 2019-10-30 VITALS — BODY MASS INDEX: 29.7 KG/M2

## 2019-10-30 DIAGNOSIS — E55.9: ICD-10-CM

## 2019-10-30 DIAGNOSIS — K74.1: ICD-10-CM

## 2019-10-30 DIAGNOSIS — K50.90: ICD-10-CM

## 2019-10-30 DIAGNOSIS — D50.9: ICD-10-CM

## 2019-10-30 DIAGNOSIS — K21.9: Primary | ICD-10-CM

## 2019-10-30 DIAGNOSIS — N19: ICD-10-CM

## 2019-10-30 DIAGNOSIS — E44.0: ICD-10-CM

## 2019-10-30 DIAGNOSIS — E89.1: ICD-10-CM

## 2019-10-30 DIAGNOSIS — E21.1: ICD-10-CM

## 2019-10-30 DIAGNOSIS — E66.01: ICD-10-CM

## 2019-10-30 LAB
ALBUMIN SERPL-MCNC: 4.5 G/DL (ref 3.8–4.9)
ALBUMIN/GLOB SERPL: 1.5 G/DL (ref 1.6–3.17)
ALP SERPL-CCNC: 61 U/L (ref 41–126)
ALT SERPL-CCNC: 28 U/L (ref 8–44)
ANION GAP SERPL CALC-SCNC: 2.9 MMOL/L (ref 4–12)
APTT BLD: 30.5 SEC (ref 22–30)
AST SERPL-CCNC: 29 U/L (ref 13–35)
BUN SERPL-SCNC: 13 MG/DL (ref 9–27)
BUN/CREAT SERPL: 21.67 RATIO (ref 12–20)
CALCIUM SPEC-MCNC: 9.9 MG/DL (ref 8.7–10.3)
CHLORIDE SERPL-SCNC: 103 MMOL/L (ref 96–109)
CHOLEST SERPL-MCNC: 173 MG/DL (ref 0–200)
CO2 SERPL-SCNC: 33.1 MMOL/L (ref 21.6–31.8)
ERYTHROCYTE [DISTWIDTH] IN BLOOD BY AUTOMATED COUNT: 5.23 M/UL (ref 3.8–5.4)
ERYTHROCYTE [DISTWIDTH] IN BLOOD: 13 % (ref 11.5–15.5)
FERRITIN SERPL-MCNC: 139.5 NG/ML (ref 10–291)
GLOBULIN SER CALC-MCNC: 3 G/DL (ref 1.6–3.3)
GLUCOSE SERPL-MCNC: 70 MG/DL (ref 70–110)
HCT VFR BLD AUTO: 48.2 % (ref 34–46)
HDLC SERPL-MCNC: 50 MG/DL (ref 40–60)
HGB BLD-MCNC: 15.4 GM/DL (ref 11.4–16)
INR PPP: 1.8 (ref ?–1.2)
IRON SERPL-MCNC: 58 UG/DL (ref 50–170)
MAGNESIUM SPEC-SCNC: 1.8 MG/DL (ref 1.5–2.4)
MCH RBC QN AUTO: 29.4 PG (ref 25–35)
MCHC RBC AUTO-ENTMCNC: 31.9 G/DL (ref 31–37)
MCV RBC AUTO: 92.1 FL (ref 80–100)
PLATELET # BLD AUTO: 485 K/UL (ref 150–450)
POTASSIUM SERPL-SCNC: 4.3 MMOL/L (ref 3.5–5.5)
PREALB SERPL-MCNC: 24 MG/DL (ref 18–42)
PROT SERPL-MCNC: 7.5 G/DL (ref 6.2–8.2)
PT BLD: 18.1 SEC (ref 9–12)
SODIUM SERPL-SCNC: 139 MMOL/L (ref 135–145)
TIBC SERPL-MCNC: 332 UG/DL (ref 228–460)
TRIGL SERPL-MCNC: <50 MG/DL (ref 0–149)
WBC # BLD AUTO: 10.2 K/UL (ref 3.8–10.6)

## 2019-10-30 PROCEDURE — 82306 VITAMIN D 25 HYDROXY: CPT

## 2019-10-30 PROCEDURE — 84425 ASSAY OF VITAMIN B-1: CPT

## 2019-10-30 PROCEDURE — 84134 ASSAY OF PREALBUMIN: CPT

## 2019-10-30 PROCEDURE — 85610 PROTHROMBIN TIME: CPT

## 2019-10-30 PROCEDURE — 83540 ASSAY OF IRON: CPT

## 2019-10-30 PROCEDURE — 83735 ASSAY OF MAGNESIUM: CPT

## 2019-10-30 PROCEDURE — 85730 THROMBOPLASTIN TIME PARTIAL: CPT

## 2019-10-30 PROCEDURE — 82728 ASSAY OF FERRITIN: CPT

## 2019-10-30 PROCEDURE — 83550 IRON BINDING TEST: CPT

## 2019-10-30 PROCEDURE — 84590 ASSAY OF VITAMIN A: CPT

## 2019-10-30 PROCEDURE — 84443 ASSAY THYROID STIM HORMONE: CPT

## 2019-10-30 PROCEDURE — 84100 ASSAY OF PHOSPHORUS: CPT

## 2019-10-30 PROCEDURE — 99211 OFF/OP EST MAY X REQ PHY/QHP: CPT

## 2019-10-30 PROCEDURE — 83036 HEMOGLOBIN GLYCOSYLATED A1C: CPT

## 2019-10-30 PROCEDURE — 85027 COMPLETE CBC AUTOMATED: CPT

## 2019-10-30 PROCEDURE — 36415 COLL VENOUS BLD VENIPUNCTURE: CPT

## 2019-10-30 PROCEDURE — 80061 LIPID PANEL: CPT

## 2019-10-30 PROCEDURE — 82746 ASSAY OF FOLIC ACID SERUM: CPT

## 2019-10-30 PROCEDURE — 83970 ASSAY OF PARATHORMONE: CPT

## 2019-10-30 PROCEDURE — 80053 COMPREHEN METABOLIC PANEL: CPT

## 2019-10-30 PROCEDURE — 82607 VITAMIN B-12: CPT

## 2019-10-30 PROCEDURE — 84255 ASSAY OF SELENIUM: CPT

## 2019-10-30 PROCEDURE — 82525 ASSAY OF COPPER: CPT

## 2019-10-30 PROCEDURE — 84630 ASSAY OF ZINC: CPT

## 2019-10-30 NOTE — P.PN
Subjective


Progress Note Date: 10/30/19





She reports GERD with calcium. She is 2 years out. Recommend labs. Change of 

medications. Recommend followup with Hematologist, Dr. King for factor 2. 

Coumadin levels. Recommend further investigation. 





Objective





- Vital Signs


Vital signs: 


                                   Vital Signs











Temp  98.1 F   10/30/19 15:54


 


Pulse  63   10/30/19 15:54


 


Resp  16   10/30/19 15:54


 


BP  143/91   10/30/19 15:54


 


Pulse Ox      








                                 Intake & Output











 10/29/19 10/30/19 10/30/19





 18:59 06:59 18:59


 


Weight   86.835 kg

## 2019-10-31 LAB
HBA1C MFR BLD: 5 % (ref 4–6)
ZINC SERPL-MCNC: 76 UG/DL (ref 60–130)

## 2021-04-21 ENCOUNTER — HOSPITAL ENCOUNTER (OUTPATIENT)
Age: 41
End: 2021-04-21
Payer: COMMERCIAL

## 2021-04-21 PROCEDURE — 99211 OFF/OP EST MAY X REQ PHY/QHP: CPT

## 2021-04-21 NOTE — P.PN
Subjective


Progress Note Date: 04/21/21








DATE OF SERVICE: 04/21/21





CHIEF COMPLAINT: Status post sleeve gastrectomy.





HISTORY OF PRESENT ILLNESS: Marlin Zhong is a very pleasant 41-year-old female 

status post sleeve gastrectomy, 9/18/17.  She is 4 years out. She comes in with 

moderate panniculitis including symptomatic ventral hernia ongoing for over 2 to

3 years. She reports being very active and continues to lose weight. She is 

hiking and moving around much. She is running and jumping. She avoids breads and

carbs. She is taking her multivitamins. She eats peanut butter daily as a 

protein source as she reports protein first. She reports intermittent back 

pulling from her pannus. She has some troubles grooming from her pannus. She 

reports worsening large symptomatic ventral hernia which was present prior to 

her bariatric procedure.





At her height of 5 feet 7-1/4 inches, her highest weight was 315 pounds.  Today 

she weighs 164 pounds from 191 pounds, 2 years ago. She has lost 27 pounds in 2 

years.  Her lifetime weight loss is 151 pounds.  Body mass index is down from 

49.1 to 25.5  She is 6 pounds overweight. Percent excess weight loss of 96 %.





PAST MEDICAL HISTORY: 


1. 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 


2. Factor II disorder. 


3. Previous history of pulmonary embolism to the lungs. 


4. Osteoarthritis of the lower back. 


5. Osteoarthritis of the left hip. 


6. Sciatica of the left leg. 


7. History of foot drop. 


8. Obstructive sleep apnea. 


9. Morbid obesity due to excess calories, BMI 49.1


10. Chronic pain syndrome. 


11. History of spondylolisthesis. 


12.  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





PAST SURGICAL HISTORY: 


1. Tonsillectomy. 


2. Splenectomy. 


3. Cholecystectomy. 


4.  EGD.


5.  Status post sleeve gastrectomy





MEDICATIONS:


                                Home Medications











 Medication  Instructions  Recorded  Confirmed


 


Gabapentin 600 mg PO BID@0800,1200 02/22/17 05/26/21


 


Gabapentin [Neurontin] 1,200 mg PO HS 03/10/17 05/26/21


 


SUMAtriptan succinate [Imitrex] 25 mg PO DAILY PRN 06/19/18 05/26/21


 


Warfarin [Coumadin] 5 mg PO TUTH 06/19/18 05/26/21


 


Warfarin [Coumadin] 7.5 mg PO SUMOWEFRSA 06/19/18 05/26/21


 


Multivitamins, Thera [Multivitamin 1 tab PO DAILY 06/22/18 05/26/21





(formulary)]   


 


traMADol HCL [Ultram] 50 mg PO Q8HR PRN 09/17/18 05/26/21








                                  Previous Rx's











 Medication  Instructions  Recorded


 


Nystatin 100,000 Unit/gm Powd 1 applic TOPICAL BID #60 powder 04/21/21





[Mycostatin Powder]  














ALLERGIES: MORPHINE. 





SOCIAL HISTORY: Former tobacco user. 





FAMILY HISTORY: No reports of esophageal or stomach cancer. 





REVIEW OF SYSTEMS:


CONSTITUTIONAL: At her height of 5 feet 7-1/4 inches,  her highest weight was 

315 pounds. Body mass index was 49.1. 


HEENT: No troubles with vision, hearing. Denies dysphagia. 


ENDOCRINE: No reports of diabetes or thyroid disorders. 


RESPIRATORY: Denies any dyspnea on exertion. She has previous history of 

pulmonary embolism.  


CARDIOVASCULAR: No reports of palpitations or heart attack. 


GASTROINTESTINAL: No reports of food allergies. No reports of diarrhea.  No 

reports of moderate gastroesophageal reflux disease.


MUSCULOSKELETAL: Has spondylosis of the lower back. Chronic lower back pain. 

Also reports osteoarthritis of the hip and sciatica.  


NEURO: No reports of stroke or seizure disorder. Has chronic pain. Has 

neurorpathy.


PSYCH: Has depression. No suicidal ideation.  


HEMATOLOGIC: History of hypercoagulable disorder. Previous history of pulmonary 

embolism. She is on Xarelto.


SKIN: Has panniculitis. No cancer.





PHYSICAL EXAM:


VITAL SIGNS:  5 foot 7-1/4 frame, 164 pounds. Body mass index 25.5


                                   Vital Signs











Temp  98.2 F   04/21/21 17:25


 


Pulse  57 L  04/21/21 17:25


 


Resp  16   04/21/21 17:25


 


BP  126/66   04/21/21 17:25


 


Pulse Ox      











GENERAL: Well-developed, pleasant female in no acute distress. 


HEENT: No scleral icterus. Extraocular movements grossly intact. Moist buccal 

mucosa.  


NECK: Supple without lymphadenopathy. 


CHEST: Nonlabored respirations with equal breath excursions. 


CARDIOVASCULAR: Regular rate and rhythm. 


ABDOMEN: Soft, nondistended.  Nontender. Large ventral hernia 8-cm at the 

epigastrium. No peritonitis.  Grade 3 panniculosis with panniculitis.


MUSCULOSKELETAL: No clubbing, cyanosis, or edema. 


NEURO: No focal or lateralizing signs.  Cranial nerves II through XII grossly 

intact.


PSYCH: Appropriate affect. Alert and oriented to person, place, and time.  


SKIN: Well perfused.  Good skin turgor.





ASSESSMENT: 


1. Morbid obesity due to excess calories. 


2. Body mass index 49.1 down to 25.5


5. Personal history of hypercoagulable disorder factor II. 


6. Previous history of pulmonary embolism. 


7. Spondylosis of the lower spine. 


8. Osteoarthritis of the lower back. 


9. Osteoarthritis of the left hip. 


10. Obstructive sleep apnea, resolved.


11. Chronic anticoagulation. 


12. Asplenia. 


13. Hypertension, resolved.


14. 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 


15. 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 


16. Vitamin D deficiency. 


17. Chronic gastritis. 


18.  Status post sleeve gastrectomy.


19.  Ventral hernia present prior to bariatric procedure.


20.  Panniculitis.


21.  Chronic anticoagulation. 





PLAN: 


1.  She comes in with panniculitis. Recommend referral to dermatologist for 

managment of her panniculitis.


2.  Recommend Nystatin powder for treatment. 


3.  Recommend correction of micro and macro deficiences following completion of 

bariatric metabolic panel.


4.  Recommend panniculectomy for chronic panniculitis with concomittant severe 

lower back pain and uncontrolled symptoms despite systemic and local treatment 

including limitation of activities of daily living. Anticipated resection over 

10+  pounds described. Panniculectomy should correct her functional deficits.


5.  Recommend 2 week protein diet for optimal recovery


6.  Risks of bleeding, needs for drains, flap failure, infection, need for 

further surgery were described.  She is high risk for jose-operative 

complications with anticipated 10+ skin resection including chronic 

anticoagulant use. 


7.  Inpatient hospitalization also described


8.  DVT prophylaxis.


9.  Antibiotic prophylaxis


10.  Extended recovery more than 6-8 weeks described including placement of 

drains more than 2 weeks reviewed.


11.  Recommend repair of large abdominal wall defect with myocutaneous flap 

advancement bilateral.











Objective





- Vital Signs


Vital signs: 


                                   Vital Signs











Temp  98.2 F   04/21/21 17:25


 


Pulse  57 L  04/21/21 17:25


 


Resp  16   04/21/21 17:25


 


BP  126/66   04/21/21 17:25


 


Pulse Ox      








                                 Intake & Output











 04/20/21 04/21/21 04/21/21





 18:59 06:59 18:59


 


Weight   74.389 kg

## 2021-04-21 NOTE — P.PN
Progress Note - Text


Progress Note Date: 04/21/21





She comes in with panniculectomy and hernia repair. Recommend paninculectomy. 

Recommend dermatologist for panniculitis.  Recommend bariatric labs. She reports

being very active. She is hiking and moving much. She is running and jumping. 

She avoids bread and carbs. She is taking multivitamin. She eats peanut butter 

daily.  


Nystatin powder. She is protein first. She has hernia over 8 cm epigastric 

hernia.  She has grade 3 panniculitis

## 2021-04-30 ENCOUNTER — HOSPITAL ENCOUNTER (OUTPATIENT)
Dept: HOSPITAL 47 - LABWHC1 | Age: 41
Discharge: HOME | End: 2021-04-30
Attending: SURGERY
Payer: COMMERCIAL

## 2021-04-30 DIAGNOSIS — E66.01: Primary | ICD-10-CM

## 2021-04-30 DIAGNOSIS — D50.8: ICD-10-CM

## 2021-04-30 DIAGNOSIS — K74.1: ICD-10-CM

## 2021-04-30 DIAGNOSIS — E55.9: ICD-10-CM

## 2021-04-30 DIAGNOSIS — K90.89: ICD-10-CM

## 2021-04-30 DIAGNOSIS — N19: ICD-10-CM

## 2021-04-30 DIAGNOSIS — K50.90: ICD-10-CM

## 2021-04-30 PROCEDURE — 80061 LIPID PANEL: CPT

## 2021-04-30 PROCEDURE — 84590 ASSAY OF VITAMIN A: CPT

## 2021-04-30 PROCEDURE — 82728 ASSAY OF FERRITIN: CPT

## 2021-04-30 PROCEDURE — 36415 COLL VENOUS BLD VENIPUNCTURE: CPT

## 2021-04-30 PROCEDURE — 84255 ASSAY OF SELENIUM: CPT

## 2021-04-30 PROCEDURE — 84630 ASSAY OF ZINC: CPT

## 2021-04-30 PROCEDURE — 82306 VITAMIN D 25 HYDROXY: CPT

## 2021-04-30 PROCEDURE — 82607 VITAMIN B-12: CPT

## 2021-04-30 PROCEDURE — 85730 THROMBOPLASTIN TIME PARTIAL: CPT

## 2021-04-30 PROCEDURE — 82525 ASSAY OF COPPER: CPT

## 2021-04-30 PROCEDURE — 83735 ASSAY OF MAGNESIUM: CPT

## 2021-04-30 PROCEDURE — 84100 ASSAY OF PHOSPHORUS: CPT

## 2021-04-30 PROCEDURE — 80053 COMPREHEN METABOLIC PANEL: CPT

## 2021-04-30 PROCEDURE — 85610 PROTHROMBIN TIME: CPT

## 2021-04-30 PROCEDURE — 83970 ASSAY OF PARATHORMONE: CPT

## 2021-04-30 PROCEDURE — 82746 ASSAY OF FOLIC ACID SERUM: CPT

## 2021-04-30 PROCEDURE — 84443 ASSAY THYROID STIM HORMONE: CPT

## 2021-04-30 PROCEDURE — 85027 COMPLETE CBC AUTOMATED: CPT

## 2021-04-30 PROCEDURE — 83540 ASSAY OF IRON: CPT

## 2021-04-30 PROCEDURE — 84134 ASSAY OF PREALBUMIN: CPT

## 2021-04-30 PROCEDURE — 83036 HEMOGLOBIN GLYCOSYLATED A1C: CPT

## 2021-04-30 PROCEDURE — 84425 ASSAY OF VITAMIN B-1: CPT

## 2021-04-30 PROCEDURE — 83550 IRON BINDING TEST: CPT

## 2021-05-01 LAB
ALBUMIN SERPL-MCNC: 3.8 G/DL (ref 3.8–4.9)
ALBUMIN/GLOB SERPL: 1.31 G/DL (ref 1.6–3.17)
ALP SERPL-CCNC: 61 U/L (ref 41–126)
ALT SERPL-CCNC: 19 U/L (ref 8–44)
ANION GAP SERPL CALC-SCNC: 5.8 MMOL/L (ref 4–12)
APTT BLD: 35.6 SEC (ref 23.5–31)
AST SERPL-CCNC: 24 U/L (ref 13–35)
BUN SERPL-SCNC: 12 MG/DL (ref 9–27)
BUN/CREAT SERPL: 15 RATIO (ref 12–20)
CALCIUM SPEC-MCNC: 9 MG/DL (ref 8.7–10.3)
CHLORIDE SERPL-SCNC: 105 MMOL/L (ref 96–109)
CHOLEST SERPL-MCNC: 149 MG/DL (ref 0–200)
CO2 SERPL-SCNC: 29.2 MMOL/L (ref 21.6–31.8)
ERYTHROCYTE [DISTWIDTH] IN BLOOD BY AUTOMATED COUNT: 4.27 X 10*6/UL (ref 4.1–5.2)
ERYTHROCYTE [DISTWIDTH] IN BLOOD: 14.1 % (ref 11.5–14.5)
FERRITIN SERPL-MCNC: 92.2 NG/ML (ref 10–291)
GLOBULIN SER CALC-MCNC: 2.9 G/DL (ref 1.6–3.3)
GLUCOSE SERPL-MCNC: 107 MG/DL (ref 70–110)
HBA1C MFR BLD: 5.2 % (ref 4–6)
HCT VFR BLD AUTO: 39.7 % (ref 37.2–46.3)
HDLC SERPL-MCNC: 52 MG/DL (ref 40–60)
HGB BLD-MCNC: 12.8 G/DL (ref 12–15)
INR PPP: 2.19 (ref 0.9–1.11)
IRON SERPL-MCNC: 69 UG/DL (ref 50–170)
LDLC SERPL CALC-MCNC: 85 MG/DL (ref 0–131)
MAGNESIUM SPEC-SCNC: 1.8 MG/DL (ref 1.5–2.4)
MCH RBC QN AUTO: 30 PG (ref 27–32)
MCHC RBC AUTO-ENTMCNC: 32.2 G/DL (ref 32–37)
MCV RBC AUTO: 93 FL (ref 80–97)
PLATELET # BLD AUTO: 403 X 10*3/UL (ref 140–440)
POTASSIUM SERPL-SCNC: 3.8 MMOL/L (ref 3.5–5.5)
PREALB SERPL-MCNC: 16 MG/DL (ref 18–42)
PROT SERPL-MCNC: 6.7 G/DL (ref 6.2–8.2)
PT BLD: 22.7 SEC (ref 9.9–11.9)
SODIUM SERPL-SCNC: 140 MMOL/L (ref 135–145)
TIBC SERPL-MCNC: 277 UG/DL (ref 228–460)
TRIGL SERPL-MCNC: 60 MG/DL (ref 0–149)
VIT B12 SERPL-MCNC: 638 PG/ML (ref 200–944)
VLDLC SERPL CALC-MCNC: 12 MG/DL (ref 5–40)
WBC # BLD AUTO: 9.39 X 10*3/UL (ref 4.5–10)

## 2021-05-03 LAB — ZINC SERPL-MCNC: 52 UG/DL (ref 60–130)

## 2021-05-26 ENCOUNTER — HOSPITAL ENCOUNTER (OUTPATIENT)
Dept: HOSPITAL 47 - BARWHC3 | Age: 41
End: 2021-05-26
Attending: SURGERY
Payer: COMMERCIAL

## 2021-05-26 VITALS
RESPIRATION RATE: 18 BRPM | DIASTOLIC BLOOD PRESSURE: 82 MMHG | HEART RATE: 59 BPM | SYSTOLIC BLOOD PRESSURE: 124 MMHG | TEMPERATURE: 97.2 F

## 2021-05-26 VITALS — BODY MASS INDEX: 25.6 KG/M2

## 2021-05-26 DIAGNOSIS — M79.3: ICD-10-CM

## 2021-05-26 DIAGNOSIS — M16.12: ICD-10-CM

## 2021-05-26 DIAGNOSIS — G47.33: ICD-10-CM

## 2021-05-26 DIAGNOSIS — E55.9: ICD-10-CM

## 2021-05-26 DIAGNOSIS — K29.50: ICD-10-CM

## 2021-05-26 DIAGNOSIS — E50.9: ICD-10-CM

## 2021-05-26 DIAGNOSIS — E66.01: Primary | ICD-10-CM

## 2021-05-26 DIAGNOSIS — Z79.01: ICD-10-CM

## 2021-05-26 DIAGNOSIS — D47.3: ICD-10-CM

## 2021-05-26 DIAGNOSIS — G89.4: ICD-10-CM

## 2021-05-26 DIAGNOSIS — Q89.01: ICD-10-CM

## 2021-05-26 DIAGNOSIS — Z98.84: ICD-10-CM

## 2021-05-26 DIAGNOSIS — E60: ICD-10-CM

## 2021-05-26 DIAGNOSIS — Z87.891: ICD-10-CM

## 2021-05-26 DIAGNOSIS — M47.9: ICD-10-CM

## 2021-05-26 DIAGNOSIS — Z86.2: ICD-10-CM

## 2021-05-26 DIAGNOSIS — I10: ICD-10-CM

## 2021-05-26 DIAGNOSIS — D72.829: ICD-10-CM

## 2021-05-26 DIAGNOSIS — Z88.5: ICD-10-CM

## 2021-05-26 DIAGNOSIS — Z86.711: ICD-10-CM

## 2021-05-26 DIAGNOSIS — K43.2: ICD-10-CM

## 2021-05-26 PROCEDURE — 99211 OFF/OP EST MAY X REQ PHY/QHP: CPT

## 2021-05-26 NOTE — P.PN
Subjective


Progress Note Date: 05/26/21








DATE OF SERVICE: 05/26/21





CHIEF COMPLAINT: Status post sleeve gastrectomy.





HISTORY OF PRESENT ILLNESS: Marlin Zhong is a 41-year-old female status post 

sleeve gastrectomy, 9/18/17.  She is 4 years out. She comes in with severe 

panniculitis and complicated incisional hernia unrelated to her bariatric pr

ocedure. She reports abdominal discomfort. She comes in for surgical options. 





At her height of 5 feet 7-1/4 inches, her highest weight was 315 pounds.  Today 

she weighs 164 unchanged from 1 month ago. Her lifetime weight loss is 151 

pounds.  Body mass index is down from 49.1 to 25.6  She is 6 pounds overweight. 

Percent excess weight loss of 96 %.





PAST MEDICAL HISTORY: 


1. 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 


2. Factor II disorder. 


3. Previous history of pulmonary embolism to the lungs. 


4. Osteoarthritis of the lower back. 


5. Osteoarthritis of the left hip. 


6. Sciatica of the left leg. 


7. History of foot drop. 


8. Obstructive sleep apnea. 


9. Morbid obesity due to excess calories, BMI 49.1


10. Chronic pain syndrome. 


11. History of spondylolisthesis. 


12.  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





PAST SURGICAL HISTORY: 


1. Tonsillectomy. 


2. Splenectomy. 


3. Cholecystectomy. 


4.  EGD.


5.  Status post sleeve gastrectomy





MEDICATIONS:


                                Home Medications











 Medication  Instructions  Recorded  Confirmed


 


Gabapentin 600 mg PO BID@0800,1200 02/22/17 05/26/21


 


Gabapentin [Neurontin] 1,200 mg PO HS 03/10/17 05/26/21


 


SUMAtriptan succinate [Imitrex] 25 mg PO DAILY PRN 06/19/18 05/26/21


 


Warfarin [Coumadin] 5 mg PO TUTH 06/19/18 05/26/21


 


Warfarin [Coumadin] 7.5 mg PO SUMOWEFRSA 06/19/18 05/26/21


 


Multivitamins, Thera [Multivitamin 1 tab PO DAILY 06/22/18 05/26/21





(formulary)]   


 


traMADol HCL [Ultram] 50 mg PO Q8HR PRN 09/17/18 05/26/21








                                  Previous Rx's











 Medication  Instructions  Recorded


 


Nystatin 100,000 Unit/gm Powd 1 applic TOPICAL BID #60 powder 04/21/21





[Mycostatin Powder]  














ALLERGIES: MORPHINE. 





SOCIAL HISTORY: Former tobacco user. 





FAMILY HISTORY: No reports of esophageal or stomach cancer. 





REVIEW OF SYSTEMS:


CONSTITUTIONAL: At her height of 5 feet 7-1/4 inches,  her highest weight was 

315 pounds. Body mass index was 49.1. 


HEENT: No troubles with vision, hearing. Denies dysphagia. 


ENDOCRINE: No reports of diabetes or thyroid disorders. 


RESPIRATORY: Denies any dyspnea on exertion. She has previous history of 

pulmonary embolism.  


CARDIOVASCULAR: No reports of palpitations or heart attack. 


GASTROINTESTINAL: No reports of food allergies. No reports of diarrhea.  No 

reports of moderate gastroesophageal reflux disease.


MUSCULOSKELETAL: Has spondylosis of the lower back. Chronic lower back pain. 

Also reports osteoarthritis of the hip and sciatica.  


NEURO: No reports of stroke or seizure disorder. Has chronic pain. Has 

neurorpathy.


PSYCH: Has depression. No suicidal ideation.  


HEMATOLOGIC: History of hypercoagulable disorder. Previous history of pulmonary 

embolism. She is on Xarelto.


SKIN: Has panniculitis. No cancer.





PHYSICAL EXAM:


VITAL SIGNS:  5 foot 7-1/4 frame, 164 pounds. Body mass index 25.6


                                   Vital Signs











Temp  97.2 F L  05/26/21 14:57


 


Pulse  59 L  05/26/21 14:57


 


Resp  18   05/26/21 14:57


 


BP  124/82   05/26/21 14:57


 


Pulse Ox      











GENERAL: Well-developed, pleasant female in no acute distress. 


HEENT: No scleral icterus. Extraocular movements grossly intact. Moist buccal 

mucosa.  


NECK: Supple without lymphadenopathy. 


CHEST: Nonlabored respirations with equal breath excursions. 


CARDIOVASCULAR: Regular rate and rhythm. 


ABDOMEN: Soft, nondistended.  Nontender. Large ventral hernia 8-cm at the 

epigastrium. No peritonitis.  Grade 3 panniculosis with panniculitis.


MUSCULOSKELETAL: No clubbing, cyanosis, or edema. 


NEURO: No focal or lateralizing signs.  Cranial nerves II through XII grossly 

intact.


PSYCH: Appropriate affect. Alert and oriented to person, place, and time.  


SKIN: Well perfused.  Good skin turgor.





LABS: Reviewed. INR elevated 2.19. Pre-albumin low. Vitamin A low. Zinc low





ASSESSMENT: 


1. Morbid obesity due to excess calories. 


2. Body mass index 49.1 down to 25.5


5. Personal history of hypercoagulable disorder factor II. 


6. Previous history of pulmonary embolism. 


7. Spondylosis of the lower spine. 


8. Osteoarthritis of the lower back. 


9. Osteoarthritis of the left hip. 


10. Obstructive sleep apnea, resolved.


11. Chronic anticoagulation. 


12. Asplenia. 


13. Hypertension, resolved.


14. 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 


15. 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 


16. Vitamin D deficiency. 


17. Chronic gastritis. 


18.  Status post sleeve gastrectomy.


19.  Ventral hernia present prior to bariatric procedure.


20.  Panniculitis.


21.  Chronic anticoagulation. 


22.  Zinc deficiency


23.  Vitamin A deficiency





PLAN: 


1.  She has Zinc, Vitamin A deficiency which will need correction. 


2.  Zinc supplement 50 mg daily.


3.  Vitamin A supplement 10,000 units daily.


4.  Recommend re-check in 4 to 6 weeks


5.  Referral to dermatologist for panniculitis described.


6.  Recommend panniculectomy and ventral hernia repair. 


7.  She is elevated risk for complications with chronic anticoagulant use and 

hypercoaguable disorder.











Objective





- Vital Signs


Vital signs: 


                                   Vital Signs











Temp  97.2 F L  05/26/21 14:57


 


Pulse  59 L  05/26/21 14:57


 


Resp  18   05/26/21 14:57


 


BP  124/82   05/26/21 14:57


 


Pulse Ox      








                                 Intake & Output











 05/25/21 05/26/21 05/26/21





 18:59 06:59 18:59


 


Weight   74.752 kg

## 2021-07-14 ENCOUNTER — HOSPITAL ENCOUNTER (OUTPATIENT)
Dept: HOSPITAL 47 - LABWHC1 | Age: 41
Discharge: HOME | End: 2021-07-14
Attending: SURGERY
Payer: COMMERCIAL

## 2021-07-14 DIAGNOSIS — R00.1: ICD-10-CM

## 2021-07-14 DIAGNOSIS — R94.31: ICD-10-CM

## 2021-07-14 DIAGNOSIS — Z01.818: Primary | ICD-10-CM

## 2021-07-14 LAB
BASOPHILS # BLD AUTO: 0.13 X 10*3/UL (ref 0–0.1)
BASOPHILS NFR BLD AUTO: 1.3 %
EOSINOPHIL # BLD AUTO: 0.23 X 10*3/UL (ref 0.04–0.35)
EOSINOPHIL NFR BLD AUTO: 2.3 %
ERYTHROCYTE [DISTWIDTH] IN BLOOD BY AUTOMATED COUNT: 4.45 X 10*6/UL (ref 4.1–5.2)
ERYTHROCYTE [DISTWIDTH] IN BLOOD: 13.5 % (ref 11.5–14.5)
HCT VFR BLD AUTO: 40.5 % (ref 37.2–46.3)
HGB BLD-MCNC: 13.3 G/DL (ref 12–15)
LYMPHOCYTES # SPEC AUTO: 4.81 X 10*3/UL (ref 0.9–5)
LYMPHOCYTES NFR SPEC AUTO: 48.8 %
MCH RBC QN AUTO: 29.9 PG (ref 27–32)
MCHC RBC AUTO-ENTMCNC: 32.8 G/DL (ref 32–37)
MCV RBC AUTO: 91 FL (ref 80–97)
MONOCYTES # BLD AUTO: 0.97 X 10*3/UL (ref 0.2–1)
MONOCYTES NFR BLD AUTO: 9.8 %
NEUTROPHILS # BLD AUTO: 3.69 X 10*3/UL (ref 1.8–7.7)
NEUTROPHILS NFR BLD AUTO: 37.6 %
PLATELET # BLD AUTO: 411 X 10*3/UL (ref 140–440)
WBC # BLD AUTO: 9.85 X 10*3/UL (ref 4.5–10)

## 2021-07-14 PROCEDURE — 85025 COMPLETE CBC W/AUTO DIFF WBC: CPT

## 2021-07-14 PROCEDURE — 80053 COMPREHEN METABOLIC PANEL: CPT

## 2021-07-14 PROCEDURE — 93005 ELECTROCARDIOGRAM TRACING: CPT

## 2021-07-14 PROCEDURE — 36415 COLL VENOUS BLD VENIPUNCTURE: CPT

## 2021-07-15 LAB
ALBUMIN SERPL-MCNC: 3.8 G/DL (ref 3.8–4.9)
ALBUMIN/GLOB SERPL: 1.36 G/DL (ref 1.6–3.17)
ALP SERPL-CCNC: 59 U/L (ref 41–126)
ALT SERPL-CCNC: 24 U/L (ref 8–44)
ANION GAP SERPL CALC-SCNC: 11.4 MMOL/L (ref 4–12)
AST SERPL-CCNC: 30 U/L (ref 13–35)
BUN SERPL-SCNC: 11 MG/DL (ref 9–27)
BUN/CREAT SERPL: 15.71 RATIO (ref 12–20)
CALCIUM SPEC-MCNC: 9.1 MG/DL (ref 8.7–10.3)
CHLORIDE SERPL-SCNC: 106 MMOL/L (ref 96–109)
CO2 SERPL-SCNC: 24.6 MMOL/L (ref 21.6–31.8)
GLOBULIN SER CALC-MCNC: 2.8 G/DL (ref 1.6–3.3)
GLUCOSE SERPL-MCNC: 103 MG/DL (ref 70–110)
POTASSIUM SERPL-SCNC: 4.2 MMOL/L (ref 3.5–5.5)
PROT SERPL-MCNC: 6.6 G/DL (ref 6.2–8.2)
SODIUM SERPL-SCNC: 142 MMOL/L (ref 135–145)

## 2021-07-19 ENCOUNTER — HOSPITAL ENCOUNTER (INPATIENT)
Dept: HOSPITAL 47 - 2ORMAIN | Age: 41
LOS: 1 days | Discharge: HOME | DRG: 571 | End: 2021-07-20
Attending: SURGERY | Admitting: SURGERY
Payer: COMMERCIAL

## 2021-07-19 VITALS — RESPIRATION RATE: 16 BRPM

## 2021-07-19 VITALS — BODY MASS INDEX: 24.5 KG/M2

## 2021-07-19 DIAGNOSIS — Z87.891: ICD-10-CM

## 2021-07-19 DIAGNOSIS — Z79.01: ICD-10-CM

## 2021-07-19 DIAGNOSIS — Z86.711: ICD-10-CM

## 2021-07-19 DIAGNOSIS — Z90.81: ICD-10-CM

## 2021-07-19 DIAGNOSIS — D72.829: ICD-10-CM

## 2021-07-19 DIAGNOSIS — G89.4: ICD-10-CM

## 2021-07-19 DIAGNOSIS — D68.69: ICD-10-CM

## 2021-07-19 DIAGNOSIS — G47.33: ICD-10-CM

## 2021-07-19 DIAGNOSIS — M21.379: ICD-10-CM

## 2021-07-19 DIAGNOSIS — M16.12: ICD-10-CM

## 2021-07-19 DIAGNOSIS — K29.50: ICD-10-CM

## 2021-07-19 DIAGNOSIS — E55.9: ICD-10-CM

## 2021-07-19 DIAGNOSIS — M54.32: ICD-10-CM

## 2021-07-19 DIAGNOSIS — Z98.84: ICD-10-CM

## 2021-07-19 DIAGNOSIS — R79.89: ICD-10-CM

## 2021-07-19 DIAGNOSIS — M47.9: ICD-10-CM

## 2021-07-19 DIAGNOSIS — K43.0: ICD-10-CM

## 2021-07-19 DIAGNOSIS — M79.3: Primary | ICD-10-CM

## 2021-07-19 DIAGNOSIS — I10: ICD-10-CM

## 2021-07-19 DIAGNOSIS — F32.9: ICD-10-CM

## 2021-07-19 DIAGNOSIS — Z82.49: ICD-10-CM

## 2021-07-19 LAB
BASOPHILS # BLD AUTO: 0.1 K/UL (ref 0–0.2)
BASOPHILS NFR BLD AUTO: 1 %
EOSINOPHIL # BLD AUTO: 0.2 K/UL (ref 0–0.7)
EOSINOPHIL NFR BLD AUTO: 2 %
ERYTHROCYTE [DISTWIDTH] IN BLOOD BY AUTOMATED COUNT: 4.94 M/UL (ref 3.8–5.4)
ERYTHROCYTE [DISTWIDTH] IN BLOOD: 13 % (ref 11.5–15.5)
HCT VFR BLD AUTO: 45.5 % (ref 34–46)
HGB BLD-MCNC: 14.7 GM/DL (ref 11.4–16)
INR PPP: 1.1 (ref ?–1.2)
LYMPHOCYTES # SPEC AUTO: 4.1 K/UL (ref 1–4.8)
LYMPHOCYTES NFR SPEC AUTO: 42 %
MCH RBC QN AUTO: 29.7 PG (ref 25–35)
MCHC RBC AUTO-ENTMCNC: 32.2 G/DL (ref 31–37)
MCV RBC AUTO: 92.2 FL (ref 80–100)
MONOCYTES # BLD AUTO: 0.6 K/UL (ref 0–1)
MONOCYTES NFR BLD AUTO: 7 %
NEUTROPHILS # BLD AUTO: 4.5 K/UL (ref 1.3–7.7)
NEUTROPHILS NFR BLD AUTO: 46 %
PLATELET # BLD AUTO: 449 K/UL (ref 150–450)
PT BLD: 11.5 SEC (ref 9–12)
WBC # BLD AUTO: 9.8 K/UL (ref 3.8–10.6)

## 2021-07-19 PROCEDURE — 0JB80ZZ EXCISION OF ABDOMEN SUBCUTANEOUS TISSUE AND FASCIA, OPEN APPROACH: ICD-10-PCS

## 2021-07-19 PROCEDURE — 94640 AIRWAY INHALATION TREATMENT: CPT

## 2021-07-19 PROCEDURE — 64999 UNLISTED PX NERVOUS SYSTEM: CPT

## 2021-07-19 PROCEDURE — 0JX80ZZ TRANSFER ABDOMEN SUBCUTANEOUS TISSUE AND FASCIA, OPEN APPROACH: ICD-10-PCS

## 2021-07-19 PROCEDURE — 85025 COMPLETE CBC W/AUTO DIFF WBC: CPT

## 2021-07-19 PROCEDURE — 0WQF0ZZ REPAIR ABDOMINAL WALL, OPEN APPROACH: ICD-10-PCS

## 2021-07-19 PROCEDURE — 86850 RBC ANTIBODY SCREEN: CPT

## 2021-07-19 PROCEDURE — 86900 BLOOD TYPING SEROLOGIC ABO: CPT

## 2021-07-19 PROCEDURE — 81025 URINE PREGNANCY TEST: CPT

## 2021-07-19 PROCEDURE — 85610 PROTHROMBIN TIME: CPT

## 2021-07-19 PROCEDURE — 88302 TISSUE EXAM BY PATHOLOGIST: CPT

## 2021-07-19 PROCEDURE — 86901 BLOOD TYPING SEROLOGIC RH(D): CPT

## 2021-07-19 PROCEDURE — 76942 ECHO GUIDE FOR BIOPSY: CPT

## 2021-07-19 RX ADMIN — POTASSIUM CHLORIDE SCH: 14.9 INJECTION, SOLUTION INTRAVENOUS at 21:46

## 2021-07-19 RX ADMIN — HYDROMORPHONE HYDROCHLORIDE PRN MG: 1 INJECTION, SOLUTION INTRAMUSCULAR; INTRAVENOUS; SUBCUTANEOUS at 20:10

## 2021-07-19 RX ADMIN — KETOROLAC TROMETHAMINE SCH MG: 15 INJECTION, SOLUTION INTRAMUSCULAR; INTRAVENOUS at 23:30

## 2021-07-19 RX ADMIN — HYDROMORPHONE HYDROCHLORIDE PRN MG: 1 INJECTION, SOLUTION INTRAMUSCULAR; INTRAVENOUS; SUBCUTANEOUS at 20:30

## 2021-07-19 RX ADMIN — HYDROMORPHONE HYDROCHLORIDE PRN MG: 1 INJECTION, SOLUTION INTRAMUSCULAR; INTRAVENOUS; SUBCUTANEOUS at 20:01

## 2021-07-19 RX ADMIN — THIAMINE HYDROCHLORIDE SCH MLS/HR: 100 INJECTION, SOLUTION INTRAMUSCULAR; INTRAVENOUS at 22:48

## 2021-07-19 NOTE — P.OP
Date of Procedure: 07/19/21


Description of Procedure: 











SURGEON:  NORA SCOTT MD


PREOPERATIVE DIAGNOSES:


1.  Panniculitis


2.  Adiposus panniculus. 


3.  Incisional hernia status post splenectomy


4.  Status post sleeve gastrectomy


5.  Massive weight loss over 150 pounds


6.  Depressive disorder


7.  Postoperative nausea and vomiting


8. Morbid obesity due to excess calories, resolved


9. Body mass index 49.1 down to 24.5


10. Personal history of hypercoagulable disorder factor II. 


11. Previous history of pulmonary embolism. 


12. Spondylosis of the lower spine. 


13. Osteoarthritis of the lower back. 


14. Osteoarthritis of the left hip. 


15. Obstructive sleep apnea, resolved.


16. Chronic anticoagulation. 


17. Asplenia. 


18. Hypertension, resolved.


19. 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 


20. 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 





POSTOPERATIVE DIAGNOSES:


1.  Panniculitis


2.  Adiposus panniculus. 


3.  Incisional hernia status post splenectomy


4.  Status post sleeve gastrectomy


5.  Massive weight loss over 150 pounds


6.  Depressive disorder


7.  Postoperative nausea and vomiting


8. Morbid obesity due to excess calories, resolved


9. Body mass index 49.1 down to 24.5


10. Personal history of hypercoagulable disorder factor II. 


11. Previous history of pulmonary embolism. 


12. Spondylosis of the lower spine. 


13. Osteoarthritis of the lower back. 


14. Osteoarthritis of the left hip. 


15. Obstructive sleep apnea, resolved.


16. Chronic anticoagulation. 


17. Asplenia. 


18. Hypertension, resolved.


19. 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 


20. 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 


13.  Abdominal ventral hernia, 30 x 11 cm, unrelated to prior bariatric surgery.





OPERATION:   


1. Sbdominal wall reconstruction bilateral myocutaneous bilateral flap 

advancement.


2. Primary repair of ventral hernia 25 x 13 cm cm without mesh.   


3. Panniculectomy, 3.52 pounds





ANESTHESIA:  General, regional block


ESTIMATED BLOOD LOSS:  250  mL 


SPECIMENS REMOVED:  Pannus 3.52 pounds.


COMPLICATIONS:  None.


CONDITION: Stable.





DRAINS: Two #19 Jorge drains below abdominal flap extending through the pubis.





OPERATIVE FINDINGS:  


1. Pannus weighing 3.52 pounds, excised. 


2. Abdominal ventral hernia of 25 x 13 cm along the midline repaired primarily 

using fascial imbrication. 





INDICATIONS: The patient is a 41-year-old female with a complicated history of 

large incisional hernia following splenectomy including status post massive 

weight loss over 150 pounds after sleeve gastrectomy.   Her body mass index is 

reduced from approximately 49.1 down to 24.5. Given her clinical symptoms, 

including massive weight loss, she elected for surgical intervention with a 

panniculectomy. Benefits and risks of the procedure including bleeding, 

infection, risk of flap failure were described at length. Informed consent was 

obtained. 





DESCRIPTION: In the preanesthesia care unit the patient was marked with an 

indelible marker.  Additionally, regional block was placed per anesthesia She 

had also been given heparin subcutaneously. 





The patient was brought into the operating room and laid in supine position. 

After general induction, a Arvizu catheter was placed. The abdomen was then 

prepped and draped in standard sterile fashion using ChloraPrep. The skin was 

prepped as far laterally to the back, inferiorly to the upper thighs and haskins

periorly to above the bilateral breasts. 





A timeout protocol was confirmed with the surgical team regarding patient's 

name, procedure to be performed, including preoperative medications. She had 

received Ancef 2 grams IV antibiotics. 





Once the time-out protocol was confirmed with the surgical team, the patient was

re-marked with indelible marker whereby the midline of the xiphoid to the mons 

pubis was marked. The anterior/superior iliac spine along the bilateral hips was

also marked. At 7 cm above the pubis commissure a transverse incision was made 

for the inferior portion of the flap. Using a #10 blade, the incision was taken 

from the midline laterally to above the anterior/superior iliac spine, initially

on the left side of the patient and then on the right side of the patient.





Electro-Bovie cautery was used to control for hemostasis. The dissection was 

taken down to the level of the fascia. Landmarks used were the xiphoid process 

as well as the bilateral costal margins for the superior margin. Care was taken 

to avoid any creation of dog ears during the dissection. 





Once hemostasis was checked, a large incisional swiss cheese ventral hernia 

fascial defect of 13 x 25 cm was identified. During this dissection, the 

umbilicus was truncated at its fascial insertion.  Bilateral myocutaneous flap 

advancement was performed to close the large defect of 25 x 13 cm using the 

rectus muscle.  After the flaps were raised, the midline was re-marked again 

from the xiphoid to the pubis commissure. Fascial imbrication was proposed for 

primary repair and to reinforce the bilateral myocutaneous flap advancement. 





Starting from the xiphoid process, the rectus muscle was overlapped in the 

bilateral myocutaneous flap advancement using #2 Ethibond.  The ventral hernia 

defect was completely repaired and closed.  





Hemostasis was once again checked with electro-Bovie cautery and all defects 

were addressed. Attention was now brought to closure of the flap. Using 

stainless steel skin staples, the midline was once again marked of the upper 

flap as well as the pubic commissure. The patient was placed in a flexed 

position of approximately 20 degrees at the hips. The pannus was extended 

inferiorly to the feet. The upper flap was created once the excess skin was 

excised. Again care was taken to avoid any dog ears along the lateral aspect of 

the incisions. Once excised, the pannus weighed approximately 3.52 pounds.





The upper and lower flaps were reapproximated at the midline and then laterally 

to the skin with skin staples. Once reapproximated, the skin was closed in 

layers using 0 Vicryl for the superficial fascial system followed by running 3-0

Monocryl for the deep dermis.





Prior to skin closure, two round #19 Jorge drains were placed underneath the 

flap and brought out just inferior to the incision along the pubis. Drain stitch

using 2-0 nylon was placed. 





Once the incision was closed, bulb suction was attached. Hemostasis was checked.

Exofin tape with glue including Optifoam dressing was placed.





At the end of the procedure, the needle, sponge and instrument count was 

verified correct. 





The patient was then transferred to a hospital bed in a beach chair position. An

abdominal binder was placed and marked. 





The patient was taken to the postanesthesia care unit in stable condition, awake

and extubated.

## 2021-07-19 NOTE — P.GSHP
History of Present Illness


H&P Date: 07/19/21











CHIEF COMPLAINT: Panniculitis and abdominal wall hernia





HISTORY OF PRESENT ILLNESS: Marlin Zhong is a very pleasant 41-year-old female 

status post sleeve gastrectomy, 9/18/17.  She is 4 years out.  She comes in with

a large ventral hernia including symptomatic panniculus with panniculitis.  She 

is maintained weight loss over 150+ pounds.  She is presents today for surgical 

resection and repair of abdominal wall hernia.





At her height of 5 feet 7-1/4 inches, her highest weight was 315 pounds.  Her 

lifetime weight loss is 151 pounds.  Body mass index is down from 49.1 to 25.5  

She is 6 pounds overweight. Percent excess weight loss of 96 %.





PAST MEDICAL HISTORY: 


1. 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 


2. Factor II disorder. 


3. Previous history of pulmonary embolism to the lungs. 


4. Osteoarthritis of the lower back. 


5. Osteoarthritis of the left hip. 


6. Sciatica of the left leg. 


7. History of foot drop. 


8. Obstructive sleep apnea. 


9. Morbid obesity due to excess calories, BMI 49.1


10. Chronic pain syndrome. 


11. History of spondylolisthesis. 


12.  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





PAST SURGICAL HISTORY: 


1. Tonsillectomy. 


2. Splenectomy. 


3. Cholecystectomy. 


4.  EGD.


5.  Status post sleeve gastrectomy





MEDICATIONS:


                                Home Medications











 Medication  Instructions  Recorded  Confirmed


 


Gabapentin 600 mg PO BID@0800,1200 02/22/17 05/26/21


 


Gabapentin [Neurontin] 1,200 mg PO HS 03/10/17 05/26/21


 


SUMAtriptan succinate [Imitrex] 25 mg PO DAILY PRN 06/19/18 05/26/21


 


Warfarin [Coumadin] 5 mg PO TUTH 06/19/18 05/26/21


 


Warfarin [Coumadin] 7.5 mg PO SUMOWEFRSA 06/19/18 05/26/21


 


Multivitamins, Thera [Multivitamin 1 tab PO DAILY 06/22/18 05/26/21





(formulary)]   


 


traMADol HCL [Ultram] 50 mg PO Q8HR PRN 09/17/18 05/26/21








                                  Previous Rx's











 Medication  Instructions  Recorded


 


Nystatin 100,000 Unit/gm Powd 1 applic TOPICAL BID #60 powder 04/21/21





[Mycostatin Powder]  














ALLERGIES: MORPHINE. 





SOCIAL HISTORY: Former tobacco user. 





FAMILY HISTORY: No reports of esophageal or stomach cancer. 





REVIEW OF SYSTEMS:


CONSTITUTIONAL: At her height of 5 feet 7-1/4 inches,  her highest weight was 

315 pounds. Body mass index was 49.1. 


HEENT: No troubles with vision, hearing. Denies dysphagia. 


ENDOCRINE: No reports of diabetes or thyroid disorders. 


RESPIRATORY: Denies any dyspnea on exertion. She has previous history of 

pulmonary embolism.  


CARDIOVASCULAR: No reports of palpitations or heart attack. 


GASTROINTESTINAL: No reports of food allergies. No reports of diarrhea.  No 

reports of moderate gastroesophageal reflux disease.


MUSCULOSKELETAL: Has spondylosis of the lower back. Chronic lower back pain. 

Also reports osteoarthritis of the hip and sciatica.  


NEURO: No reports of stroke or seizure disorder. Has chronic pain. Has 

neurorpathy.


PSYCH: Has depression. No suicidal ideation.  


HEMATOLOGIC: History of hypercoagulable disorder. Previous history of pulmonary 

embolism. She is on Xarelto.


SKIN: Has panniculitis. No cancer.





PHYSICAL EXAM:


VITAL SIGNS:  5 foot 7-1/4 frame, 164 pounds. Body mass index 25.5





GENERAL: Well-developed, pleasant female in no acute distress. 


HEENT: No scleral icterus. Extraocular movements grossly intact. Moist buccal 

mucosa.  


NECK: Supple without lymphadenopathy. 


CHEST: Nonlabored respirations with equal breath excursions. 


CARDIOVASCULAR: Regular rate and rhythm. 


ABDOMEN: Soft, nondistended.  Nontender. Large ventral hernia 8-cm at the 

epigastrium. No peritonitis.  Grade 3 panniculosis with panniculitis.


MUSCULOSKELETAL: No clubbing, cyanosis, or edema. 


NEURO: No focal or lateralizing signs.  Cranial nerves II through XII grossly 

intact.


PSYCH: Appropriate affect. Alert and oriented to person, place, and time.  


SKIN: Well perfused.  Good skin turgor.





ASSESSMENT: 


1. Morbid obesity due to excess calories. 


2. Body mass index 49.1 down to 25.5


5. Personal history of hypercoagulable disorder factor II. 


6. Previous history of pulmonary embolism. 


7. Spondylosis of the lower spine. 


8. Osteoarthritis of the lower back. 


9. Osteoarthritis of the left hip. 


10. Obstructive sleep apnea, resolved.


11. Chronic anticoagulation. 


12. Asplenia. 


13. Hypertension, resolved.


14. 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 


15. 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 


16. Vitamin D deficiency. 


17. Chronic gastritis. 


18.  Status post sleeve gastrectomy.


19.  Ventral hernia present prior to bariatric procedure.


20.  Panniculitis.


21.  Chronic anticoagulation. 





PLAN: 


1.  Recommend panniculectomy for chronic panniculitis with concomittant severe 

lower back pain and uncontrolled symptoms despite systemic and local treatment 

including limitation of activities of daily living. Anticipated resection over 

10+  pounds described. Panniculectomy should correct her functional deficits.


2. Recommend repair of large abdominal wall defect with myocutaneous flap 

advancement bilateral.


3. Benefits and risks including but not limited to recurrence of abdominal wall 

hernia, bleeding, infection and cosmetic deformity, chronic pain, seromas were 

reviewed.





Past Medical History


Past Medical History: Blood Disorder, Musculoskeletal Disorder, Osteoarthritis 

(OA), Pulmonary Embolus (PE), Sleep Apnea/CPAP/BIPAP


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

2003, severe spondylosis of back.  COVID VACCINATED


History of Any Multi-Drug Resistant Organisms: None Reported


Past Surgical History: Bariatric Surgery, Tonsillectomy


Additional Past Surgical History / Comment(s): splenectomy, EGD, gastric sleeve 

(9/18/17 Dr. Lora Arias)


Past Anesthesia/Blood Transfusion Reactions: Motion Sickness, Postoperative 

Nausea & Vomiting (PONV)


Past Psychological History: Depression


Smoking Status: Former smoker


Past Alcohol Use History: None Reported


Past Drug Use History: None Reported





- Past Family History


  ** Mother


Family Medical History: Blood Disorder, Pulmonary Embolus


Additional Family Medical History / Comment(s): mom has factor 2 also





  ** Father


Family Medical History: Myocardial Infarction (MI)


Additional Family Medical History / Comment(s): passed at age 63





Medications and Allergies


                                Home Medications











 Medication  Instructions  Recorded  Confirmed  Type


 


Gabapentin 600 mg PO QAM 02/22/17 07/15/21 History


 


Warfarin [Coumadin] 5 mg PO AS DIRECTED 06/19/18 07/15/21 History


 


Warfarin [Coumadin] 7.5 mg PO AS DIRECTED 06/19/18 07/15/21 History


 


traMADol HCL [Ultram] 50 mg PO QID 09/17/18 07/15/21 History


 


Gabapentin 600 mg PO AC-LUNCH 07/15/21 07/15/21 History


 


Gabapentin [Neurontin] 1,200 mg PO HS 07/15/21 07/15/21 History


 


Sertraline HCl [Zoloft] 150 mg PO HS 07/15/21 07/15/21 History








                                    Allergies











Allergy/AdvReac Type Severity Reaction Status Date / Time


 


No Known Allergies Allergy   Verified 07/15/21 15:30

## 2021-07-20 VITALS — TEMPERATURE: 97.9 F | DIASTOLIC BLOOD PRESSURE: 59 MMHG | SYSTOLIC BLOOD PRESSURE: 109 MMHG

## 2021-07-20 VITALS — HEART RATE: 80 BPM

## 2021-07-20 LAB
BASOPHILS # BLD AUTO: 0.1 K/UL (ref 0–0.2)
BASOPHILS NFR BLD AUTO: 0 %
EOSINOPHIL # BLD AUTO: 0.1 K/UL (ref 0–0.7)
EOSINOPHIL NFR BLD AUTO: 0 %
ERYTHROCYTE [DISTWIDTH] IN BLOOD BY AUTOMATED COUNT: 4.45 M/UL (ref 3.8–5.4)
ERYTHROCYTE [DISTWIDTH] IN BLOOD: 12.5 % (ref 11.5–15.5)
HCT VFR BLD AUTO: 42.8 % (ref 34–46)
HGB BLD-MCNC: 13.4 GM/DL (ref 11.4–16)
LYMPHOCYTES # SPEC AUTO: 4.4 K/UL (ref 1–4.8)
LYMPHOCYTES NFR SPEC AUTO: 28 %
MCH RBC QN AUTO: 30.1 PG (ref 25–35)
MCHC RBC AUTO-ENTMCNC: 31.3 G/DL (ref 31–37)
MCV RBC AUTO: 96.2 FL (ref 80–100)
MONOCYTES # BLD AUTO: 1.4 K/UL (ref 0–1)
MONOCYTES NFR BLD AUTO: 9 %
NEUTROPHILS # BLD AUTO: 9.6 K/UL (ref 1.3–7.7)
NEUTROPHILS NFR BLD AUTO: 61 %
PLATELET # BLD AUTO: 384 K/UL (ref 150–450)
WBC # BLD AUTO: 15.6 K/UL (ref 3.8–10.6)

## 2021-07-20 RX ADMIN — KETOROLAC TROMETHAMINE SCH MG: 15 INJECTION, SOLUTION INTRAMUSCULAR; INTRAVENOUS at 17:53

## 2021-07-20 RX ADMIN — THIAMINE HYDROCHLORIDE SCH MLS/HR: 100 INJECTION, SOLUTION INTRAMUSCULAR; INTRAVENOUS at 11:52

## 2021-07-20 RX ADMIN — KETOROLAC TROMETHAMINE SCH MG: 15 INJECTION, SOLUTION INTRAMUSCULAR; INTRAVENOUS at 11:49

## 2021-07-20 RX ADMIN — ACETAMINOPHEN SCH MG: 500 TABLET ORAL at 05:41

## 2021-07-20 RX ADMIN — KETOROLAC TROMETHAMINE SCH MG: 15 INJECTION, SOLUTION INTRAMUSCULAR; INTRAVENOUS at 05:41

## 2021-07-20 RX ADMIN — POTASSIUM CHLORIDE SCH: 14.9 INJECTION, SOLUTION INTRAVENOUS at 05:42

## 2021-07-20 RX ADMIN — ACETAMINOPHEN SCH MG: 500 TABLET ORAL at 11:48

## 2021-07-20 RX ADMIN — ACETAMINOPHEN SCH MG: 500 TABLET ORAL at 17:54

## 2021-07-20 NOTE — P.PN
Subjective


Progress Note Date: 07/20/21





CHIEF COMPLAINT: Panniculitis





HISTORY OF PRESENT ILLNESS: Patient is status post Abdominal wall reconstruction

bilateral myocutaneous bilateral flap advancement, Primary repair of 

incarcerated incisional hernia without mesh and Panniculectomy.  Patient did 

receive nerve block by anesthesia service.  She reports having pain.  Pain is 

controlled with pain medication.  Denies any nausea vomiting.  Started on a 

regular diet this morning.  Patient is sleepy.  Per nursing staff patient has 

ambulated in the hallway twice.  They also reports that patient oxygen 

saturation is only 90% on room air.  When they placed a 2 L on she goes up to 

about 90-94%.  Patient is ex-smoker.  And she reported to the nursing staff that

since having Covid her oxygen saturations have not been the same.  Afebrile.  

WBC 15.16 will and 13.4 platelets are 384.





PHYSICAL EXAM: 


VITAL SIGNS: Reviewed


GENERAL: Well-developed in no acute distress. 


HEENT:  No sclera icterus. Extraocular movements grossly intact.  Moist buccal 

mucosa. 


Head is atraumatic, normocephalic. Hears conversational speech. No nasal 

drainage.


NECK:  Supple without lymphadenopathy.


CHEST:  Non-labored respirations and equal bilateral excursions. 


CARDIOVASCULAR:   Palpable 2+ radial pulses.


ABDOMEN:  Soft.  Nondistended.  Abdominal binder in place.  2 IVETTE drains 15 mL 

sanguinous output from each drain


MUSCULOSKELETAL:  No clubbing or cyanosis.


NEUROLOGIC:  No focal or lateralizing signs.  Cranial nerves II through XII gr

ossly intact.


PSYCH:  Appropriate affect.  Alert and oriented to person, place and time.


SKIN: Well perfused.  Good skin turgor. 





ASSESSMENT: 


1.  Panniculitis


2.  Adiposus panniculus. 


3.  Incisional hernia status post splenectomy


4.  Status post sleeve gastrectomy


5.  Massive weight loss over 150 pounds


6.  Depressive disorder


7.  Postoperative nausea and vomiting


8. Morbid obesity due to excess calories, resolved


9. Body mass index 49.1 down to 24.5


10. Personal history of hypercoagulable disorder factor II. 


11. Previous history of pulmonary embolism. 


12. Spondylosis of the lower spine. 


13. Osteoarthritis of the lower back. 


14. Osteoarthritis of the left hip. 


15. Obstructive sleep apnea, resolved.


16. Chronic anticoagulation. 


17. Asplenia. 


18. Hypertension, resolved.


19. 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 


20. 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 


21.  Abdominal incisional incarcerated ventral hernia, 30 x 11 cm, unrelated to 

prior bariatric surgery.








PLAN:


-Add nebulizer treatments 


-Encouraged patient to ambulate


-Encouraged patient to use incentive spirometer


-Continue pain medication as needed


-GI prophylaxis Protonix and DVT prophylaxis Lovenox





Physician Assistant note has been reviewed by physician. Signing provider agrees

with the documented findings, assessment, and plan of care. 





Objective





- Vital Signs


Vital signs: 


                                   Vital Signs











Temp  98.0 F   07/20/21 08:19


 


Pulse  46 L  07/20/21 08:19


 


Resp  16   07/20/21 08:19


 


BP  120/67   07/20/21 08:19


 


Pulse Ox  100   07/20/21 08:19








                                 Intake & Output











 07/19/21 07/20/21 07/20/21





 18:59 06:59 18:59


 


Intake Total 2050 1000 


 


Output Total  1200 220


 


Balance 2050 -200 -220


 


Weight 70.9 kg  70.9 kg


 


Intake:   


 


  IV 2050 800 


 


  Oral  200 


 


Output:   


 


  Drainage  100 70


 


    Suprapubic A  50 35


 


    Suprapubic B  50 35


 


  Urine  850 150


 


  Estimated Blood Loss  250 


 


Other:   


 


  Voiding Method  Indwelling Catheter 














- Labs


CBC & Chem 7: 


                                 07/20/21 06:45





Labs: 


                  Abnormal Lab Results - Last 24 Hours (Table)











  07/20/21 Range/Units





  06:45 


 


WBC  15.6 H  (3.8-10.6)  k/uL


 


Neutrophils #  9.6 H  (1.3-7.7)  k/uL


 


Monocytes #  1.4 H  (0-1.0)  k/uL

## 2021-07-20 NOTE — P.DS
Providers


Date of admission: 


07/19/21 11:07





Expected date of discharge: 07/20/21


Attending physician: 


Lora Arias





Consults: 





                                        





07/19/21 10:18


Consult Physician Routine 


   Consulting Provider: [facility]


   Consult Reason/Comments: Regional block


   Do you want consulting provider notified?: Yes











Primary care physician: 


Diogenes Kimbrough








Plan - Discharge Summary


Discharge Rx Participant: Yes


New Discharge Prescriptions: 


New


   Omeprazole [PriLOSEC] 40 mg PO DAILY #14 cap


   Acetaminophen Tab [Tylenol Tab] 1,000 mg PO Q6HR PRN #30 tablet


     PRN Reason: Pain


   Ibuprofen [Motrin] 600 mg PO Q8HR PRN #30 tab


     PRN Reason: Pain





Continue


   Gabapentin 600 mg PO QAM


   Warfarin [Coumadin] 7.5 mg PO AS DIRECTED


   Warfarin [Coumadin] 5 mg PO AS DIRECTED


   traMADol HCL [Ultram] 50 mg PO QID


   Gabapentin [Neurontin] 1,200 mg PO HS


   Sertraline HCl [Zoloft] 150 mg PO HS


   Gabapentin 600 mg PO AC-LUNCH


Discharge Medication List





Gabapentin 600 mg PO QAM 02/22/17 [History]


Warfarin [Coumadin] 5 mg PO AS DIRECTED 06/19/18 [History]


Warfarin [Coumadin] 7.5 mg PO AS DIRECTED 06/19/18 [History]


traMADol HCL [Ultram] 50 mg PO QID 09/17/18 [History]


Gabapentin 600 mg PO AC-LUNCH 07/15/21 [History]


Gabapentin [Neurontin] 1,200 mg PO HS 07/15/21 [History]


Sertraline HCl [Zoloft] 150 mg PO HS 07/15/21 [History]


Acetaminophen Tab [Tylenol Tab] 1,000 mg PO Q6HR PRN #30 tablet 07/20/21 [Rx]


Ibuprofen [Motrin] 600 mg PO Q8HR PRN #30 tab 07/20/21 [Rx]


Omeprazole [PriLOSEC] 40 mg PO DAILY #14 cap 07/20/21 [Rx]








Follow up Appointment(s)/Referral(s): 


[facility] [[facility] - 07/23/21 9:00 am


Patient Instructions/Handouts:  How to Use an Incentive Spirometer (DC), 

Raad-Thompson Drain Care (DC), Abdominal Binder (DC), Ventral Hernia Repair 

(GEN), Panniculectomy (DC)


Activity/Diet/Wound Care/Special Instructions: 


START COUMADIN MONDAY JULY 26TH


CONTACT YOUR PAIN SPECIALIST FOR ANY NARCOTICS


Protein intake over 75 grams daily for optimal recovery. 


No lifting over 4 pounds in 4 weeks, August 19th


No bathtub soaks. No shower. 


No stretching or twisting. 


Sleep in a recliner. 


DO NOT REMOVE DRESSINGS. 


DO NOT REMOVE BINDER. 


Keep record of IVETTE outputs daily. 


Discharge Disposition: HOME SELF-CARE

## 2021-07-20 NOTE — P.ANPRN
Procedure Note - Anesthesia





- Nerve Block Performed


  ** Bilateral Erector Spinae Single


Time Out Performed: Yes


Date of Procedure: 07/19/21


Procedure Start Time: 15:03


Procedure Stop Time: 15:09


Location of Patient: PreOp


Indication: Acute Post-Operative Pain, Requested by Surgeon


Sedation Type: Sedate with meaningful contact maintained


Preparation: Sterile Prep


Position: Prone


Needle Types: Pajunk


Needle Gauge: 21


Ultrasound used to visualize needle placement: Yes


Ultrasound used to observe medication spread: Yes


Blood Aspirated: No


Pain Paresthesia on Injection Noted: No


Resistance on Injection: Normal


Image Stored and Saved: Yes


Events: Uneventful and Well Tolerated (Ropivacaine 0.5% 15 mL plus Xylocaine 

1.5% with epi injected bilaterally at T12)

## 2022-04-04 ENCOUNTER — HOSPITAL ENCOUNTER (OUTPATIENT)
Dept: HOSPITAL 47 - RADMAMWWP | Age: 42
Discharge: HOME | End: 2022-04-04
Attending: FAMILY MEDICINE
Payer: COMMERCIAL

## 2022-04-04 DIAGNOSIS — R92.1: ICD-10-CM

## 2022-04-04 DIAGNOSIS — Z80.3: ICD-10-CM

## 2022-04-04 DIAGNOSIS — Z12.31: Primary | ICD-10-CM

## 2022-04-04 PROCEDURE — 77067 SCR MAMMO BI INCL CAD: CPT

## 2022-04-05 NOTE — MM
Reason for exam: screening  (asymptomatic).

Baseline mammogram.



History:

Family history of breast cancer in paternal grandmother and breast cancer in 4 

aunts.

Taking hormonal contraceptives for 7 months.



Physical Findings:

A clinical breast exam by your physician is recommended on an annual basis and 

results should be correlated with mammographic findings.



MG Screening Mammo w CAD

Bilateral CC and MLO view(s) were taken.

The breast tissue is heterogeneously dense. This may lower the sensitivity of 

mammography.

Finding: There are faint coarse heterogeneous, grouped/clustered calcifications in

the left breast.





ASSESSMENT: Incomplete: need additional imaging evaluation, BI-RAD 0



RECOMMENDATION:

Special view mammogram of the left breast.



[facility] will attempt to contact patient to return for supplemental 

views.

## 2022-07-13 ENCOUNTER — HOSPITAL ENCOUNTER (EMERGENCY)
Dept: HOSPITAL 47 - EC | Age: 42
Discharge: HOME | End: 2022-07-13
Payer: COMMERCIAL

## 2022-07-13 VITALS — HEART RATE: 54 BPM | TEMPERATURE: 98.1 F | SYSTOLIC BLOOD PRESSURE: 113 MMHG | DIASTOLIC BLOOD PRESSURE: 69 MMHG

## 2022-07-13 VITALS — RESPIRATION RATE: 16 BRPM

## 2022-07-13 DIAGNOSIS — E04.1: Primary | ICD-10-CM

## 2022-07-13 DIAGNOSIS — Z87.891: ICD-10-CM

## 2022-07-13 DIAGNOSIS — Z20.822: ICD-10-CM

## 2022-07-13 PROCEDURE — 76536 US EXAM OF HEAD AND NECK: CPT

## 2022-07-13 PROCEDURE — 87636 SARSCOV2 & INF A&B AMP PRB: CPT

## 2022-07-13 PROCEDURE — 96372 THER/PROPH/DIAG INJ SC/IM: CPT

## 2022-07-13 PROCEDURE — 99284 EMERGENCY DEPT VISIT MOD MDM: CPT

## 2022-07-13 NOTE — US
EXAMINATION TYPE: US thyroid st tissue head/neck

 

DATE OF EXAM: 7/13/2022

 

COMPARISON: NONE

 

CLINICAL HISTORY: swelling. Neck edema. Difficulty swallowing 

 

GLAND SIZE:

 

Right Lobe: 5.6 x 2.0 x 1.8 cm

** Overall Parenchyma:  homogenous

Left Lobe: 5.3 x 2.0 x 2.0 cm

** Overall Parenchyma:  homogeneous

Isthmus Thickness:  0.5 cm

 

NODULES

 

RIGHT:   # of nodules measured on right: 1

1.  1.8 X 1.1  x 1.6 cm, upper , mixed cystic and solid, hypoechoic nodule, which is wider than tall,
 with smooth margins, without echogenic foci.

 ** Prior size: no prior 

 

LEFT:    # of nodules measured on left:  2

1.  1.8 X 1.6  x 1.5 cm, lower , mixed cystic and solid, hypoechoic nodule, which is wider than tall,
 with smooth margins, without echogenic foci.

 ** Prior size: no prior 

 

2.  0.9 X 0.4  x 0.7 cm, medial,  mixed cystic and solid, hypoechoic nodule, which is wider than tall
, with smooth margins, without echogenic foci.

 ** Prior size: no prior 

 

ISTHMUS:    # of nodules measured in the isthmus:  0

 

Bilateral neck scanned, normal appearing lymph nodes noted

 

IMPRESSION:

Bilateral thyroid nodules which are TR-RADS 3  and meet criteria for follow-up in one year.

 

2017 ACR TI-RADS LEVEL: TR-RADS 3 - Mildly Suspicious: Follow if > 1.5 cm, FNA if > 2.5 cm

*Highest TI-RADS level nodule reported

## 2022-07-13 NOTE — ED
ENT HPI





- General


Chief complaint: ENT


Stated complaint: Swelling in throat, Difficulty Swallowing


Time Seen by Provider: 07/13/22 16:15


Source: patient, RN notes reviewed


Mode of arrival: ambulatory


Limitations: no limitations





- History of Present Illness


Initial comments: 


Patient is a 42-year-old  female presents to the emergency room at the 

direction of her dentist after she has had 2 days of increased swelling in her 

throat and pain with swallowing after eating some almonds. She reports that it 

felt like something was stuck under her tongue however she never was able to 

clear any objects. She has a history of a tonsillectomy but is unsure if her 

adenoids were also removed. She reports that she has to swallow multiple times 

to get fluids down but once she is able to get fluids or food down she does not 

have any nausea or vomiting with her symptoms. She reports that the increase in 

swelling has also caused her to feel some pressure in her ears without any 

tinnitus dizziness or overt headaches. She denies any chest pain, difficulty 

breathing, fevers or chills. She has a past medical history significant for 

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 She also has

a past medical history significant for sleep apnea and arthritis. She denies any

other complaints or concerns at this time. 





- Related Data


                                Home Medications











 Medication  Instructions  Recorded  Confirmed


 


traMADol HCL [Ultram] 50 mg PO Q6H PRN 09/17/18 07/13/22


 


Gabapentin 600 mg PO TID 07/15/21 07/13/22


 


Sertraline HCl [Zoloft] 150 mg PO HS 07/15/21 07/13/22


 


Apixaban [Eliquis] 5 mg PO BID 07/13/22 07/13/22


 


Multivitamins, Thera [Multivitamin 1 tab PO DAILY 07/13/22 07/13/22





(formulary)]   











                                    Allergies











Allergy/AdvReac Type Severity Reaction Status Date / Time


 


No Known Allergies Allergy   Verified 07/13/22 18:45














Review of Systems


ROS Statement: 


Those systems with pertinent positive or pertinent negative responses have been 

documented in the HPI.





ROS Other: All systems not noted in ROS Statement are negative.





Past Medical History


Past Medical History: Blood Disorder, Musculoskeletal Disorder, Osteoarthritis 

(OA), Pulmonary Embolus (PE), Sleep Apnea/CPAP/BIPAP


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

2003, severe spondylosis of back.  COVID VACCINATED


History of Any Multi-Drug Resistant Organisms: None Reported


Past Surgical History: Bariatric Surgery, Tonsillectomy


Additional Past Surgical History / Comment(s): splenectomy, EGD, gastric sleeve 

(9/18/17 Dr. Lora Arias) panniculectomy 7-19-21


Past Anesthesia/Blood Transfusion Reactions: Motion Sickness, Postoperative 

Nausea & Vomiting (PONV)


Past Psychological History: Depression


Smoking Status: Former smoker


Past Alcohol Use History: None Reported


Past Drug Use History: None Reported





- Past Family History


  ** Mother


Family Medical History: Blood Disorder, Pulmonary Embolus


Additional Family Medical History / Comment(s): mom has factor 2 also





  ** Father


Family Medical History: Myocardial Infarction (MI)


Additional Family Medical History / Comment(s): passed at age 63





General Exam


Limitations: no limitations


General appearance: alert, in no apparent distress


Head exam: Present: atraumatic, normocephalic, normal inspection


Eye exam: Present: normal appearance, PERRL, EOMI.  Absent: scleral icterus, 

conjunctival injection, periorbital swelling


ENT exam: Present: other (Right TM not visualized due to cerumen. Left normal)


  ** Expanded


Mouth exam: Present: tongue normal.  Absent: drooling


Teeth exam: Present: normal inspection


Throat exam: other (Tonsils absent, mild exudate near uvula bilaterally. Large 

submandibular tender nodules right greater than left right approximately 2 cm in

diameter.)


Neck exam: Present: tenderness, lymphadenopathy


Respiratory exam: Present: normal lung sounds bilaterally.  Absent: respiratory 

distress, wheezes, rales, rhonchi, stridor


Cardiovascular Exam: Present: regular rate, normal rhythm, normal heart sounds. 

Absent: systolic murmur, diastolic murmur, rubs, gallop, clicks


GI/Abdominal exam: Present: soft, normal bowel sounds.  Absent: distended, 

tenderness, guarding, rebound, rigid


Extremities exam: Present: normal inspection, full ROM, normal capillary refill.

 Absent: tenderness, pedal edema, joint swelling, calf tenderness


Back exam: Present: normal inspection


Neurological exam: Present: alert, oriented X3, CN II-XII intact


Psychiatric exam: Present: normal affect, normal mood


Skin exam: Present: warm, dry, intact, normal color.  Absent: rash





Course


                                   Vital Signs











  07/13/22





  15:45


 


Temperature 98.1 F


 


Pulse Rate 54 L


 


Respiratory 18





Rate 


 


Blood Pressure 113/69


 


O2 Sat by Pulse 100





Oximetry 














Medical Decision Making





- Medical Decision Making


Given development after eating with increase in symptoms with each food intake 

concern for salivary stone. No evidence of stones on oral exam will check 

ultrasound of the throat and thyroid along with swabs for COVID influenza strep 

and RSV.





Culture swabs negative for COVID, influenza, strep and RSV. Ultrasound thyroid 

soft tissue shows normal lymph nodes with multiple mixed cystic solid nodules 

bilaterally. Given recent increase in size suspect underlying inflammatory 

process will give 10 mg of Decadron IM. No indication for admission. Airway 

patent. Able to swallow fluids. We'll continue oral steroids. Advised to follow-

up with primary care provider and advised need for 1 year follow-up imaging and 

possible FNA if continued growth greater than 2.5 cm.





Case discussed with Dr. Morales





- Lab Data


                                   Lab Results











  07/13/22 Range/Units





  16:54 


 


Influenza Type A (PCR)  Not Detected  (Not Detectd)  


 


Influenza Type B (PCR)  Not Detected  (Not Detectd)  


 


RSV (PCR)  Not Detected  (Not Detectd)  


 


SARS-CoV-2 (PCR)  Not Detected  (Not Detectd)  














- Radiology Data


Radiology results: report reviewed, image reviewed


Ultrasound thyroid and soft tissue head and neck shows bilateral thyroid nodules

2 to the left and 1 to the right right nodule 1.8 x 1.1 x 1.6 cm mixed cystic 

solid. Left #1 :1.8 x 1.6 x 1.5 mixed cystic solid, #2:0.9 x 0.4 x 0.7 cm mixed 

6 cystic solid





Disposition


Clinical Impression: 


 Multiple thyroid nodules





Disposition: HOME SELF-CARE


Instructions (If sedation given, give patient instructions):  Thyroid Nodules 

(ED)


Additional Instructions: 


Please complete Medrol Dosepak as prescribed. Please follow-up with your primary

care provider for further evaluation and treatment plan for bilateral thyroid 

nodules. Monitor for any difficulty eating and drinking or impaired airway. 

Please return to the Emergency Department if symptoms worsen or any other 

concerns.


Is patient prescribed a controlled substance at d/c from ED?: No


Referrals: 


Diogenes Kimbrough Jr, DO [Primary Care Provider] - 1-2 days


Time of Disposition: 19:18

## 2023-06-14 ENCOUNTER — HOSPITAL ENCOUNTER (OUTPATIENT)
Dept: HOSPITAL 47 - EC | Age: 43
Setting detail: OBSERVATION
LOS: 3 days | Discharge: HOME | End: 2023-06-17
Attending: ORTHOPAEDIC SURGERY | Admitting: ORTHOPAEDIC SURGERY
Payer: COMMERCIAL

## 2023-06-14 DIAGNOSIS — Z79.01: ICD-10-CM

## 2023-06-14 DIAGNOSIS — Z86.711: ICD-10-CM

## 2023-06-14 DIAGNOSIS — Z23: ICD-10-CM

## 2023-06-14 DIAGNOSIS — G47.33: ICD-10-CM
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
## 2023-06-14 DIAGNOSIS — Z90.81: ICD-10-CM

## 2023-06-14 DIAGNOSIS — F17.200: ICD-10-CM

## 2023-06-14 DIAGNOSIS — Z79.899: ICD-10-CM

## 2023-06-14 DIAGNOSIS — S61.252A: ICD-10-CM

## 2023-06-14 DIAGNOSIS — M65.841: Primary | ICD-10-CM

## 2023-06-14 DIAGNOSIS — Z98.84: ICD-10-CM

## 2023-06-14 DIAGNOSIS — F32.A: ICD-10-CM

## 2023-06-14 DIAGNOSIS — L03.113: ICD-10-CM

## 2023-06-14 DIAGNOSIS — R00.1: ICD-10-CM

## 2023-06-14 LAB
ALBUMIN SERPL-MCNC: 3.7 G/DL (ref 3.5–5)
ALP SERPL-CCNC: 51 U/L (ref 38–126)
ALT SERPL-CCNC: 22 U/L (ref 4–34)
ANION GAP SERPL CALC-SCNC: 4 MMOL/L
APTT BLD: 25.3 SEC (ref 22–30)
AST SERPL-CCNC: 30 U/L (ref 14–36)
BASOPHILS # BLD AUTO: 0 K/UL (ref 0–0.2)
BASOPHILS NFR BLD AUTO: 0 %
BUN SERPL-SCNC: 15 MG/DL (ref 7–17)
CALCIUM SPEC-MCNC: 9.3 MG/DL (ref 8.4–10.2)
CHLORIDE SERPL-SCNC: 104 MMOL/L (ref 98–107)
CO2 SERPL-SCNC: 30 MMOL/L (ref 22–30)
EOSINOPHIL # BLD AUTO: 0.4 K/UL (ref 0–0.7)
EOSINOPHIL NFR BLD AUTO: 4 %
ERYTHROCYTE [DISTWIDTH] IN BLOOD BY AUTOMATED COUNT: 4.72 M/UL (ref 3.8–5.4)
ERYTHROCYTE [DISTWIDTH] IN BLOOD: 12.7 % (ref 11.5–15.5)
GLUCOSE SERPL-MCNC: 79 MG/DL (ref 74–99)
HCT VFR BLD AUTO: 44.2 % (ref 34–46)
HGB BLD-MCNC: 14.4 GM/DL (ref 11.4–16)
INR PPP: 1 (ref ?–1.2)
LYMPHOCYTES # SPEC AUTO: 3.1 K/UL (ref 1–4.8)
LYMPHOCYTES NFR SPEC AUTO: 31 %
MCH RBC QN AUTO: 30.5 PG (ref 25–35)
MCHC RBC AUTO-ENTMCNC: 32.6 G/DL (ref 31–37)
MCV RBC AUTO: 93.6 FL (ref 80–100)
MONOCYTES # BLD AUTO: 1 K/UL (ref 0–1)
MONOCYTES NFR BLD AUTO: 10 %
NEUTROPHILS # BLD AUTO: 5.1 K/UL (ref 1.3–7.7)
NEUTROPHILS NFR BLD AUTO: 52 %
PLATELET # BLD AUTO: 418 K/UL (ref 150–450)
POTASSIUM SERPL-SCNC: 4.4 MMOL/L (ref 3.5–5.1)
PROT SERPL-MCNC: 7.2 G/DL (ref 6.3–8.2)
PT BLD: 11 SEC (ref 9–12)
SODIUM SERPL-SCNC: 138 MMOL/L (ref 137–145)
WBC # BLD AUTO: 9.8 K/UL (ref 3.8–10.6)

## 2023-06-14 PROCEDURE — 73218 MRI UPPER EXTREMITY W/O DYE: CPT

## 2023-06-14 PROCEDURE — 73140 X-RAY EXAM OF FINGER(S): CPT

## 2023-06-14 PROCEDURE — 80048 BASIC METABOLIC PNL TOTAL CA: CPT

## 2023-06-14 PROCEDURE — 99284 EMERGENCY DEPT VISIT MOD MDM: CPT

## 2023-06-14 PROCEDURE — 80202 ASSAY OF VANCOMYCIN: CPT

## 2023-06-14 PROCEDURE — 82565 ASSAY OF CREATININE: CPT

## 2023-06-14 PROCEDURE — 96367 TX/PROPH/DG ADDL SEQ IV INF: CPT

## 2023-06-14 PROCEDURE — 85652 RBC SED RATE AUTOMATED: CPT

## 2023-06-14 PROCEDURE — 26020 DRAIN HAND TENDON SHEATH: CPT

## 2023-06-14 PROCEDURE — 90715 TDAP VACCINE 7 YRS/> IM: CPT

## 2023-06-14 PROCEDURE — 85730 THROMBOPLASTIN TIME PARTIAL: CPT

## 2023-06-14 PROCEDURE — 85610 PROTHROMBIN TIME: CPT

## 2023-06-14 PROCEDURE — 87075 CULTR BACTERIA EXCEPT BLOOD: CPT

## 2023-06-14 PROCEDURE — 96375 TX/PRO/DX INJ NEW DRUG ADDON: CPT

## 2023-06-14 PROCEDURE — 96366 THER/PROPH/DIAG IV INF ADDON: CPT

## 2023-06-14 PROCEDURE — 96365 THER/PROPH/DIAG IV INF INIT: CPT

## 2023-06-14 PROCEDURE — 81025 URINE PREGNANCY TEST: CPT

## 2023-06-14 PROCEDURE — 87205 SMEAR GRAM STAIN: CPT

## 2023-06-14 PROCEDURE — 80053 COMPREHEN METABOLIC PANEL: CPT

## 2023-06-14 PROCEDURE — 87070 CULTURE OTHR SPECIMN AEROBIC: CPT

## 2023-06-14 PROCEDURE — 90471 IMMUNIZATION ADMIN: CPT

## 2023-06-14 PROCEDURE — 85025 COMPLETE CBC W/AUTO DIFF WBC: CPT

## 2023-06-14 RX ADMIN — APIXABAN SCH MG: 5 TABLET, FILM COATED ORAL at 19:57

## 2023-06-14 RX ADMIN — GABAPENTIN SCH MG: 400 CAPSULE ORAL at 19:56

## 2023-06-14 RX ADMIN — SODIUM CHLORIDE SCH MLS/HR: 9 INJECTION, SOLUTION INTRAVENOUS at 23:44

## 2023-06-14 RX ADMIN — SODIUM CHLORIDE SCH MLS/HR: 9 INJECTION, SOLUTION INTRAVENOUS at 17:40

## 2023-06-14 NOTE — P.HPOR
History of Present Illness


H&P Date: 06/14/23


Chief Complaint: Cellulitis right hand


This is a 43-year-old female sustained a dog bite on 06/02/2023.  She was bit in

the right middle finger by her friend's dog.  She states that she had minimal 

swelling at the time of the dog bite.  The area of the bite healed fine.  The 

last couple of days she developed increased swelling to the middle finger and 

hand.  The pain became quite severe last evening and she presented to the 

emergency department for evaluation.  She is admitted to our service for IV 

antibiotics and pain management.  She denies fever or chills.  Her white blood 

cell count is within normal limits.








Past Medical History


Past Medical History: Blood Disorder, Musculoskeletal Disorder, Osteoarthritis 

(OA), Pulmonary Embolus (PE), Sleep Apnea/CPAP/BIPAP


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

2003, severe spondylosis of back, right hand dog bite 6/2023.  COVID VACCINATED


History of Any Multi-Drug Resistant Organisms: None Reported


Past Surgical History: Bariatric Surgery, Tonsillectomy


Additional Past Surgical History / Comment(s): splenectomy, EGD, gastric sleeve 

(9/18/17 Dr. Lora Arias) panniculectomy 7-19-21


Past Anesthesia/Blood Transfusion Reactions: Motion Sickness, Postoperative 

Nausea & Vomiting (PONV)


Past Psychological History: Depression


Smoking Status: Current every day smoker


Past Alcohol Use History: None Reported


Past Drug Use History: None Reported





- Past Family History


  ** Mother


Family Medical History: Blood Disorder, Pulmonary Embolus


Additional Family Medical History / Comment(s): mom has factor 2 also





  ** Father


Family Medical History: Myocardial Infarction (MI)


Additional Family Medical History / Comment(s): passed at age 63





Medications and Allergies


                                Home Medications











 Medication  Instructions  Recorded  Confirmed  Type


 


traMADol HCL [Ultram] 50 mg PO Q6H 09/17/18 06/14/23 History


 


Gabapentin 600 mg PO BID@0700,1300 07/15/21 06/14/23 History


 


Apixaban [Eliquis] 5 mg PO BID 07/13/22 06/14/23 History


 


Gabapentin 1,200 mg PO HS 06/14/23 06/14/23 History








                                    Allergies











Allergy/AdvReac Type Severity Reaction Status Date / Time


 


No Known Allergies Allergy   Verified 06/14/23 08:26














Physical Examination


This is a pleasant 43-year-old female in no acute distress.  She is alert and 

oriented 3.  She appears healthy.  Exam of the right hand reveals swelling to 

the right middle finger.  There is minimal erythema.  She is able to flex finger

 to about 30-40.  She is lacking full extension of about 20 of the PIP and DIP

 joints.  There are no open wounds or abrasions noted.  There is a small scar on

 the radial aspect of the middle finger from a dog bite puncture.  She is 

nontender with palpation about the palm along the flexor sheath.  There is 

tenderness to the ring finger on the palmar and dorsal aspect.  Capillary refill

 to the finger is less than 3 seconds.  She does have a ring on the ring finger 

which is very tight.  She is unable to remove the ring.  Neurovascular status to

 the fingers is intact.








Results


X-rays reveal no acute fracture or bony abnormality.  No foreign bodies noted.








- Labs


Labs: 


                                      H & H











  06/14/23 Range/Units





  06:34 


 


Hgb  14.4  (11.4-16.0)  gm/dL


 


Hct  44.2  (34.0-46.0)  %








                                   Coagulation











  06/14/23 Range/Units





  06:34 


 


INR  1.0  (<1.2)  











Result Diagrams: 


                                 06/14/23 06:34





                                 06/14/23 06:34





Assessment and Plan


(1) History of dog bite


Current Visit: Yes   Status: Acute   Code(s): Z78.9 - OTHER SPECIFIED HEALTH 

STATUS   SNOMED Code(s): 023622547


   





(2) Cellulitis of right hand


Current Visit: Yes   Status: Acute   Code(s): L03.113 - CELLULITIS OF RIGHT 

UPPER LIMB   SNOMED Code(s): 60773557


   


Plan: 


The clinical and x-ray findings are discussed with the patient.  She has a 

generalized cellulitis to the finger.  We will continue with IV antibiotics for 

the next 24-48 hours along with moist heat.  She states that she is already 

improved with her first doses of antibiotics today.  We will continue to follow.

## 2023-06-14 NOTE — ED
General Adult HPI





- General


Chief complaint: Animal Bite


Stated complaint: Dog bite on right hand


Time Seen by Provider: 06/14/23 05:25


Source: patient


Mode of arrival: ambulatory


Limitations: no limitations





- History of Present Illness


Initial comments: 


This is a 43-year-old female with a past medical history including factor II on 

Eliquis presented to the emergency department for swelling to the right middle 

finger.  The patient stated that she suffered a dog bite to this finger on June 2 and stated that she denied of any initial swelling or pain.  The patient 

stated that she had noted over the last 1 day that she had pain along the length

of the right middle finger to the base of the finger.  The patient stated that 

this pain woke her up from sleep today which made her come to the emergency 

department.  The patient denied any fevers and chills as well as any nausea and 

vomiting.  The patient had to hold the finger in flexion and could not move the 

finger secondary to pain and swelling.  The patient remained in stable condition

without any further acute complaints.








- Related Data


                                Home Medications











 Medication  Instructions  Recorded  Confirmed


 


traMADol HCL [Ultram] 50 mg PO Q6H PRN 09/17/18 07/13/22


 


Gabapentin 600 mg PO TID 07/15/21 07/13/22


 


Sertraline HCl [Zoloft] 150 mg PO HS 07/15/21 07/13/22


 


Apixaban [Eliquis] 5 mg PO BID 07/13/22 07/13/22


 


Multivitamins, Thera [Multivitamin 1 tab PO DAILY 07/13/22 07/13/22





(formulary)]   








                                  Previous Rx's











 Medication  Instructions  Recorded


 


methylPREDNISolone Dose Pack 4 mg PO AS DIRECTED #21 tab 07/13/22





[Medrol Dose Pack]  











                                    Allergies











Allergy/AdvReac Type Severity Reaction Status Date / Time


 


No Known Allergies Allergy   Verified 06/14/23 05:17














Review of Systems


ROS Statement: 


Those systems with pertinent positive or pertinent negative responses have been 

documented in the HPI.





ROS Other: All systems not noted in ROS Statement are negative.





Past Medical History


Past Medical History: Blood Disorder, Musculoskeletal Disorder, Osteoarthritis 

(OA), Pulmonary Embolus (PE), Sleep Apnea/CPAP/BIPAP


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

2003, severe spondylosis of back.  COVID VACCINATED


History of Any Multi-Drug Resistant Organisms: None Reported


Past Surgical History: Bariatric Surgery, Tonsillectomy


Additional Past Surgical History / Comment(s): splenectomy, EGD, gastric sleeve 

(9/18/17 Dr. Lora Arias) panniculectomy 7-19-21


Past Anesthesia/Blood Transfusion Reactions: Motion Sickness, Postoperative 

Nausea & Vomiting (PONV)


Past Psychological History: Depression


Smoking Status: Current every day smoker


Past Alcohol Use History: None Reported


Past Drug Use History: None Reported





- Past Family History


  ** Mother


Family Medical History: Blood Disorder, Pulmonary Embolus


Additional Family Medical History / Comment(s): mom has factor 2 also





  ** Father


Family Medical History: Myocardial Infarction (MI)


Additional Family Medical History / Comment(s): passed at age 63





General Exam


Limitations: no limitations


General appearance: alert, in no apparent distress


Head exam: Present: atraumatic, normocephalic, normal inspection


Eye exam: Present: normal appearance, PERRL


Pupils: Present: normal accommodation


ENT exam: Present: normal exam, normal oropharynx, mucous membranes moist


Neck exam: Present: normal inspection, full ROM


Respiratory exam: Present: normal lung sounds bilaterally


Cardiovascular Exam: Present: regular rate, normal rhythm, normal heart sounds


GI/Abdominal exam: Present: soft, normal bowel sounds


Extremities exam: Present: normal inspection, full ROM, other (Uniform swelling 

noted down the right middle finger to the base with tenderness palpation at the 

base of the right middle finger.  The right middle finger was held in flexion 

and was tender to palpation over the entirety of the finger.)


Back exam: Present: normal inspection, full ROM


Neurological exam: Present: alert, oriented X3, CN II-XII intact


Psychiatric exam: Present: normal affect, normal mood


Skin exam: Present: warm, dry





Course





                                   Vital Signs











  06/14/23





  05:14


 


Temperature 97.8 F


 


Pulse Rate 55 L


 


Respiratory 18





Rate 


 


Blood Pressure 136/85


 


O2 Sat by Pulse 100





Oximetry 














Medical Decision Making





- Medical Decision Making


Was pt. sent in by a medical professional or institution (, PA, NP, urgent 

care, hospital, or nursing home...) When possible be specific


@  -No


Did you speak to anyone other than the patient for history (EMS, parent, family,

police, friend...)? What history was obtained from this source 


@  -No


Did you review nursing and triage notes (agree or disagree)?  Why? 


@  -I reviewed and agree with nursing and triage notes


Were old charts reviewed (outside hosp., previous admission, EMS record, old 

EKG, old radiological studies, urgent care reports/EKG's, nursing home records)?

Report findings 


@  -No old charts were reviewed


Differential Diagnosis (chest pain, altered mental status, abdominal pain women,

abdominal pain men, vaginal bleeding, weakness, fever, dyspnea, syncope, 

headache, dizziness, GI bleed, back pain, seizure, CVA, palpatations, mental 

health)? 


@  -Abscess, cellulitis, flexor tenosynovitis


EKG interpreted by me (3pts min.).


@  -None


X-rays interpreted by me (1pt min.).


@  -CXR of the right middle finger was ordered and was to pending at this time.


CT interpreted by me (1pt min.).


@  -None done


U/S interpreted by me (1pt. min.).


@  -None done


What testing was considered but not performed or refused? (CT, X-rays, U/S, 

labs)? Why?


@  -None


What meds were considered but not given or refused? Why?


@  -None


Did you discuss the management of the patient with other professionals 

(professionals i.e. DrCrow, PA, NP, lab, RT, psych nurse, , , 

teacher, , )? Give summary


@  -Yes, I did discuss with the orthopedic surgeon on-call, Dr. Contreras who did

recommend observing the patient for evaluation in the hospital for possible 

operative washout.


Was smoking cessation discussed for >3mins.?


@  -No


Was critical care preformed (if so, how long)?


@  -No


Were there social determinants of health that impacted care today? How? 

(Homelessness, low income, unemployed, alcoholism, drug addiction, 

transportation, low edu. Level, literacy, decrease access to med. care, care home, 

rehab)?


@  -No


Was there de-escalation of care discussed even if they declined (Discuss DNR or 

withdrawal of care, Hospice)? DNR status


@  -No


What co-morbidities impacted this encounter? (DM, HTN, Smoking, COPD, CAD, 

Cancer, CVA, ARF, Chemo, Hep., AIDS, mental health diagnosis, sleep apnea, 

morbid obesity)?


@  -Factor II on Eliquis


Was patient admitted / discharged? Hospital course, mention meds given and 

route, prescriptions, significant lab abnormalities, going to OR and other 

pertinent info.


@  -The patient was seen and evaluated emergency department.  Physical exam, the

patient was resting in bed without any acute distress.  Vital signs admission 

were stable.  Due to the nature the patient's complaints, initially the patient 

had received a tetanus update as well as Toradol IM.  Because the patient had 

significant exam findings including all canal signs including finger held in 

flexion, uniform swelling and significant tenderness to palpation, or take 

surgery was contacted and consulted.  They did recommend observation to be 

evaluated in the morning for potential operative washout.  The patient had l

aboratory workup obtained and was given IV ankle mycin Zosyn.  The patient was 

told of this plan and was agreeable.  The patient was placed in observation in 

stable condition.


Undiagnosed new problem with uncertain prognosis?


@  -No


Drug Therapy requiring intensive monitoring for toxicity (Heparin, Nitro, 

Insulin, Cardizem)?


@  -No


Were any procedures done?


@  -No


Diagnosis/symptom?


@  -Flexor tenosynovitis


Acute, or Chronic, or Acute on Chronic?


@  -Acute


Uncomplicated (without systemic symptoms) or Complicated (systemic symptoms)?


@  -Uncomplicated


Side effects of treatment?


@  -No


Exacerbation, Progression, or Severe Exacerbation?


@  -No


Poses a threat to life or bodily function? How? (Chest pain, USA, MI, pneumonia,

PE, COPD, DKA, ARF, appy, cholecystitis, CVA, Diverticulitis, Homicidal, 

Suicidal, threat to staff... and all critical care pts)


@  -No








Disposition


Clinical Impression: 


 Flexor tenosynovitis of finger





Disposition: ADMITTED AS IP TO THIS [facility]


Condition: Stable


Is patient prescribed a controlled substance at d/c from ED?: No


Referrals: 


Diogenes Kimbrough Jr, DO [Primary Care Provider] - 1-2 days


Time of Disposition: 06:00


Decision to Admit Reason: Admit from EC


Decision Date: 06/14/23


Decision Time: 06:00

## 2023-06-14 NOTE — XR
EXAMINATION TYPE: XR finger RT

 

DATE OF EXAM: 6/14/2023 6:52 AM

 

INDICATION: 

Patient age:Female;  43 years old; 

Reason for study: Middle finger swelling; PHH. 

 

COMPARISON: None

 

TECHNIQUE: Frontal, lateral and oblique views of the middle finger of the right hand were obtained.

 

FINDINGS: Normal alignment of the visualized joints.  No acute osseous pathology is identified. Mild 
diffuse soft tissue swelling of the middle finger. No osseous erosions. No unexpected radiopaque fore
ign body.

 

IMPRESSION: 

1.  No acute osseous pathology.

2.  Mild diffuse soft tissue swelling of the middle finger.

## 2023-06-15 RX ADMIN — HYDROCODONE BITARTRATE AND ACETAMINOPHEN PRN EACH: 5; 325 TABLET ORAL at 17:24

## 2023-06-15 RX ADMIN — GABAPENTIN SCH MG: 400 CAPSULE ORAL at 21:12

## 2023-06-15 RX ADMIN — HYDROCODONE BITARTRATE AND ACETAMINOPHEN PRN EACH: 5; 325 TABLET ORAL at 10:20

## 2023-06-15 RX ADMIN — PANTOPRAZOLE SODIUM SCH MG: 40 TABLET, DELAYED RELEASE ORAL at 05:59

## 2023-06-15 RX ADMIN — APIXABAN SCH MG: 5 TABLET, FILM COATED ORAL at 09:20

## 2023-06-15 RX ADMIN — GABAPENTIN SCH MG: 300 CAPSULE ORAL at 13:07

## 2023-06-15 RX ADMIN — APIXABAN SCH: 5 TABLET, FILM COATED ORAL at 20:52

## 2023-06-15 RX ADMIN — SODIUM CHLORIDE SCH MLS/HR: 9 INJECTION, SOLUTION INTRAVENOUS at 09:20

## 2023-06-15 RX ADMIN — HYDROCODONE BITARTRATE AND ACETAMINOPHEN PRN EACH: 5; 325 TABLET ORAL at 21:12

## 2023-06-15 RX ADMIN — GABAPENTIN SCH MG: 300 CAPSULE ORAL at 05:58

## 2023-06-15 RX ADMIN — SODIUM CHLORIDE SCH MLS/HR: 9 INJECTION, SOLUTION INTRAVENOUS at 17:25

## 2023-06-15 NOTE — P.CONS
History of Present Illness





- Reason for Consult


Consult date: 06/15/23


Medical management


Requesting physician: Tuan Contreras





- Chief Complaint


Dog bite





- History of Present Illness





This a 43-year-old female with past medical history of obstructive sleep apnea, 

uses BiPAP, osteoarthritis, pulmonary embolism in 2003, factor II mutation, anti

coagulated on eliquis, for nicotine dependence and multiple other medical issues

presented to the ER status post dog bite of the right middle finger by her 

friend's dog.  Tdap administered.  Antibiotics of Zosyn and vancomycin 

initiated.  Reporting significant increased edema and pain of the right hand and

middle finger.  Reports pain with passive range of motion.  Afebrile.  Denies 

chills.  Denies chest pain, palpitations or shortness of breath.  Bradycardic, 

asymptomatic.





Review of Systems


ROS Statement: 


Those systems with pertinent positive or pertinent negative responses have been 

documented in the HPI.





ROS Other: All systems not noted in ROS Statement are negative.











Past Medical History


Past Medical History: Blood Disorder, Musculoskeletal Disorder, Osteoarthritis 

(OA), Pulmonary Embolus (PE), Sleep Apnea/CPAP/BIPAP


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

2003, severe spondylosis of back, right hand dog bite 6/2023.  COVID VACCINATED


History of Any Multi-Drug Resistant Organisms: None Reported


Past Surgical History: Bariatric Surgery, Tonsillectomy


Additional Past Surgical History / Comment(s): splenectomy, EGD, gastric sleeve 

(9/18/17 Dr. Lora Arias) panniculectomy 7-19-21


Past Anesthesia/Blood Transfusion Reactions: Motion Sickness, Postoperative 

Nausea & Vomiting (PONV)


Past Psychological History: Depression


Smoking Status: Current every day smoker


Past Alcohol Use History: None Reported


Past Drug Use History: None Reported





- Past Family History


  ** Mother


Family Medical History: Blood Disorder, Pulmonary Embolus


Additional Family Medical History / Comment(s): mom has factor 2 also





  ** Father


Family Medical History: Myocardial Infarction (MI)


Additional Family Medical History / Comment(s): passed at age 63





Medications and Allergies


                                Home Medications











 Medication  Instructions  Recorded  Confirmed  Type


 


traMADol HCL [Ultram] 50 mg PO Q6H 09/17/18 06/14/23 History


 


Gabapentin 600 mg PO BID@0700,1300 07/15/21 06/14/23 History


 


Apixaban [Eliquis] 5 mg PO BID 07/13/22 06/14/23 History


 


Gabapentin 1,200 mg PO HS 06/14/23 06/14/23 History








                                    Allergies











Allergy/AdvReac Type Severity Reaction Status Date / Time


 


No Known Allergies Allergy   Verified 06/14/23 08:26














Physical Exam


Vitals: 


                                   Vital Signs











  Temp Pulse Resp BP BP Pulse Ox


 


 06/15/23 15:00  98.8 F  61  16   102/57  100


 


 06/15/23 08:05  98.3 F  50 L  16   114/66  99


 


 06/15/23 00:45  98.1 F  47 L  16  123/76   97


 


 06/14/23 19:58  98.1 F  50 L  16  99/55   97








                                Intake and Output











 06/15/23 06/15/23 06/15/23





 06:59 14:59 22:59


 


Intake Total  358 240


 


Balance  358 240


 


Intake:   


 


  Oral  358 240


 


Other:   


 


  # Voids 3  2











PHYSICAL EXAM:


VITAL SIGNS: As above


GENERAL: Sitting up at side of bed, no acute distress


HEENT:  Normocephalic, atraumatic.Conjunctivae normal. eyes normal. 


NECK:  Supple, No JVD. No thyroid enlargement. No LNs


CARDIOVASCULAR:  S1, S2 regular.. No murmur


RESPIRATION: Unlabored, Breath sounds diminished in the bases. No rhonchi or 

crackles. No bronchial breathing.


ABDOMEN:  Soft, nondistended, nontender . No guarding. no masses palpable. No 

ascites, No hepatosplenomegaly.Bowel sounds heard.


EXTREMITIES: Right hand and middle finger with diffuse swelling, tenderness, 

warmth, brisk capillary refill.Positive radial pulses.No evidence of lower 

extremity peripheral edema, no calf tenderness noted.  Positive DP pulses.


PSYCHIATRY: Alert and oriented X3, mood and affect normal.


NERVOUS SYSTEM:  Cranial N 2-12 grossly normal. No focal deficits. Strength and 

sensation grossly intact.


Skin: Warm and dry, no rash 











Results


CBC & Chem 7: 


                                 06/14/23 06:34





                                 06/15/23 07:21





Assessment and Plan


Assessment: 


Right hand cellulitis secondary to Dog bite


Obstructive sleep apnea, uses BiPAP


History of sleeve gastrectomy 


History of pulmonary embolism in 2003,Factor II mutation, anticoagulated on 

eliquis


History of nicotine dependence








Plan: Continue on current medication regime ,monitoring and symptomatic 

treatment.  MRI of affected extremity pending. Maintain IV antibiotics.  Close 

monitoring of renal function with repeat labs ordered for a.m.  Pain management 

as per orthopedic surgery.  Thank you for this consult.














The impression and plan of care has been dictated as directed.





:


I performed a history and examination of this patient,  discussed the same with 

the dictator.  I agree with the dictator's note ,documented as a scribe.  Any 

additional findings or plans will be noted.

## 2023-06-15 NOTE — MR
EXAMINATION TYPE: MR hand RT wo con

 

DATE OF EXAM: 6/15/2023

 

COMPARISON: Radiographs 6/14/2023

 

HISTORY: 43-year-old female evaluate right middle finger for abscess, flexor tendon sheath. Swelling 
and pain due to dog bite.

 

TECHNIQUE: Multiplanar, multisequence images of the right hand were obtained without IV contrast. 

 

FINDINGS: 

Prominent subcutaneous soft tissue swelling throughout the fingers, especially the third, fourth, and
 fifth fingers. Interest in the index finger, soft tissue swelling extends to the level of the PIP mariusz
int. Swelling extends proximally into the mid palm.

 

There is associated mild-to-moderate tenosynovitis of the third flexor tendon which extends from the 
level of the middle phalanx, proximally to the mid metacarpal level. No other discrete fluid collecti
on is identified.

 

No underlying acute fracture is seen. No suspicious bone marrow replacement. There is otherwise, no a
dditional well-defined fluid collection. Dorsal extensor tendons appear satisfactory.

 

 

IMPRESSION: 

 

1. Prominent soft tissue swelling involving the fingers, especially the third, fourth, and fifth fing
ers. Additional soft tissue swelling of the second finger extends to the PIP joint level. No underlyi
ng acute fracture seen.

2. Soft tissue swelling (correlate for cellulitis versus edema from soft tissue injury) extends proxi
paige into the mid palm.

3. There is a corresponding mild to moderate third flexor tenosynovitis (either reactive or infectiou
s) with fluid extending from the level of the middle phalanx to the mid metacarpal level.

## 2023-06-15 NOTE — P.PN
Subjective


Progress Note Date: 06/15/23


This is a 43-year-old female sustained a dog bite on 06/02/2023.  She was bit in

the right middle finger by her friend's dog.  She states that she had minimal 

swelling at the time of the dog bite.  The area of the bite healed fine.  The 

last couple of days she developed increased swelling to the middle finger and 

hand.  The pain became quite severe last evening and she presented to the 

emergency department for evaluation.  She is admitted to our service for IV 

antibiotics and pain management.  She denies fever or chills.  Her white blood 

cell count is within normal limits.











6/15/23: Patient is examined bedside this morning. She is complaining of 

increased pain and swelling in the right hand. Her pain has increased 

significantly overnight. She is unable to extend her middle finger without 

severe pain. Patient otherwise feels well and denies fevers, chills, nausea, 

vomiting, generalize malaise. 








Objective





- Vital Signs


Vital signs: 


                                   Vital Signs











Temp  98.3 F   06/15/23 08:05


 


Pulse  50 L  06/15/23 08:05


 


Resp  16   06/15/23 08:05


 


BP  114/66   06/15/23 08:05


 


Pulse Ox  99   06/15/23 08:05


 


FiO2      








                                 Intake & Output











 06/14/23 06/15/23 06/15/23





 18:59 06:59 18:59


 


Other:   


 


  # Voids 1 3 














- Exam


On examination, patient is sitting up in bed in no acute distress. She is alert 

and orientated x3. On inspection of the right hand, there is diffuse swelling of

the hand and middle finger. Small area of mile erythema at the palm. Small scar 

at the radial aspect of the middle finger. She is holding the middle finger in a

flexed position. She is unable to actively extend the finger and passive 

extension produces severe pain. There is pain with palpation of the middle 

finger and into the palm. Wrist is nontender. The hand and fingers are warm and 

well perfused with brisk capillary refill. Neurovascular status is intact of the

fingers. 











- Labs


CBC & Chem 7: 


                                 06/14/23 06:34





                                 06/15/23 07:21





Assessment and Plan


Assessment: 


Right hand cellulitis


History of dog bite to right middle finger





Plan: 


- Clinical findings were discussed with Dr. Contreras. Recommend obtaining MRI of

the right hand due to increased pain. MRI is ordered stat this morning. 


- Continue IV antibiotics. Patient currently receiving Vancomycin. Apply moist 

heat. 


- Pain management as needed. Norco 5/325 added this morning.


- Further recommendations pending MRI results.

## 2023-06-16 LAB
ANION GAP SERPL CALC-SCNC: 3 MMOL/L
BASOPHILS # BLD AUTO: 0 K/UL (ref 0–0.2)
BASOPHILS NFR BLD AUTO: 0 %
BUN SERPL-SCNC: 13 MG/DL (ref 7–17)
CALCIUM SPEC-MCNC: 8.8 MG/DL (ref 8.4–10.2)
CHLORIDE SERPL-SCNC: 107 MMOL/L (ref 98–107)
CO2 SERPL-SCNC: 27 MMOL/L (ref 22–30)
EOSINOPHIL # BLD AUTO: 0.3 K/UL (ref 0–0.7)
EOSINOPHIL NFR BLD AUTO: 4 %
ERYTHROCYTE [DISTWIDTH] IN BLOOD BY AUTOMATED COUNT: 4.1 M/UL (ref 3.8–5.4)
ERYTHROCYTE [DISTWIDTH] IN BLOOD: 12.5 % (ref 11.5–15.5)
GLUCOSE SERPL-MCNC: 80 MG/DL (ref 74–99)
HCT VFR BLD AUTO: 38.4 % (ref 34–46)
HGB BLD-MCNC: 12.8 GM/DL (ref 11.4–16)
LYMPHOCYTES # SPEC AUTO: 3.8 K/UL (ref 1–4.8)
LYMPHOCYTES NFR SPEC AUTO: 41 %
MCH RBC QN AUTO: 31.1 PG (ref 25–35)
MCHC RBC AUTO-ENTMCNC: 33.3 G/DL (ref 31–37)
MCV RBC AUTO: 93.5 FL (ref 80–100)
MONOCYTES # BLD AUTO: 0.9 K/UL (ref 0–1)
MONOCYTES NFR BLD AUTO: 10 %
NEUTROPHILS # BLD AUTO: 3.9 K/UL (ref 1.3–7.7)
NEUTROPHILS NFR BLD AUTO: 43 %
PLATELET # BLD AUTO: 340 K/UL (ref 150–450)
POTASSIUM SERPL-SCNC: 4.2 MMOL/L (ref 3.5–5.1)
SODIUM SERPL-SCNC: 137 MMOL/L (ref 137–145)
WBC # BLD AUTO: 9.2 K/UL (ref 3.8–10.6)

## 2023-06-16 RX ADMIN — GABAPENTIN SCH MG: 300 CAPSULE ORAL at 14:57

## 2023-06-16 RX ADMIN — SODIUM CHLORIDE SCH MLS/HR: 9 INJECTION, SOLUTION INTRAVENOUS at 23:58

## 2023-06-16 RX ADMIN — HYDROCODONE BITARTRATE AND ACETAMINOPHEN PRN EACH: 5; 325 TABLET ORAL at 09:10

## 2023-06-16 RX ADMIN — SODIUM CHLORIDE SCH MLS/HR: 9 INJECTION, SOLUTION INTRAVENOUS at 03:41

## 2023-06-16 RX ADMIN — APIXABAN SCH: 5 TABLET, FILM COATED ORAL at 07:57

## 2023-06-16 RX ADMIN — HYDROCODONE BITARTRATE AND ACETAMINOPHEN PRN EACH: 5; 325 TABLET ORAL at 14:57

## 2023-06-16 RX ADMIN — APIXABAN SCH MG: 5 TABLET, FILM COATED ORAL at 21:39

## 2023-06-16 RX ADMIN — GABAPENTIN SCH MG: 400 CAPSULE ORAL at 21:39

## 2023-06-16 RX ADMIN — PANTOPRAZOLE SODIUM SCH MG: 40 TABLET, DELAYED RELEASE ORAL at 06:40

## 2023-06-16 RX ADMIN — SODIUM CHLORIDE SCH MLS/HR: 9 INJECTION, SOLUTION INTRAVENOUS at 14:58

## 2023-06-16 RX ADMIN — HYDROCODONE BITARTRATE AND ACETAMINOPHEN PRN EACH: 5; 325 TABLET ORAL at 03:41

## 2023-06-16 RX ADMIN — GABAPENTIN SCH MG: 300 CAPSULE ORAL at 06:40

## 2023-06-16 NOTE — P.OP
Date of Procedure: 06/16/23


Preoperative Diagnosis: 


Flexor tenosynovitis right middle finger


Postoperative Diagnosis: 


Flexor Tenosynovitis right middle finger


Procedure(s) Performed: 


Incision and drainage right middle finger


Anesthesia: JODY


Surgeon: Tuan Contreras


Estimated Blood Loss (ml): 5


Pathology: other (Cultures 2)


Condition: stable


Disposition: PACU


Indications for Procedure: 


This is a 43-year-old female that had a dog bite to her right hand approximately

2 weeks ago.  She presented to the emergency room on Wednesday with increased 

pain and swelling in her right hand, mainly her right ring finger.  At that time

there was no indication for surgical intervention, and she was placed on an emp

irical IV antibiotics.  The following day she failed to clinically improve, an 

MRI was then ordered.  MRI showed generalized edema in the hand with a fluid 

collection in the flexor tendon sheath of the right middle finger.  After 

discussing the surgical and nonsurgical treatment options at length, I 

recommended an incision and drainage of the right hand over to further evaluate 

the possibility for infectious flexor tenosynovitis.  Informed consent was 

obtained.


Operative Findings: 


The operative findings are consistent with flexor tenosynovitis of the right 

middle finger.  There was no evidence of any infection in the tendon sheath.  

Only clear fluid was encountered.


Description of Procedure: 


The patient was seen in the preoperative area, the consent was reviewed, the 

operative site was marked with a skin marker.  Patient was then brought to the 

operating room and given a general anesthetic by the anesthesia department.  

Tourniquet was placed on the upper arm and the upper extremity was prepped and 

draped in usual sterile fashion.  A universal timeout was then performed which 

confirmed the patient's name, surgical site, ALLERGIES, and consent.  The limb 

was then exsanguinated and the tourniquet was inflated to 250 mmHg.





The procedure began by making a zigzag incision on the palmar aspect of the 

right middle finger sharply with a knife.  Incision was carefully dissected 

through the soft tissue avoiding the neurovascular structures.  The flexor 

tendon sheath was then encountered and incised sharply with a knife.  Clear 

fluid was encountered within the tendon sheath and this was cultured 2.  There 

was no evidence of any purulent material within the tendon sheath or soft 

tissues.  The tendon sheath was then extensively opened and irrigated with 

antibiotic solution.  After thorough irrigation, the skin was then closed with 

5-0 nylon suture.  Sterile dressing was applied patient was transferred recovery

room stable condition.

## 2023-06-16 NOTE — P.PN
Subjective


Progress Note Date: 06/16/23


This is a 43-year-old female who is admitted for right hand cellulitis after a 

dog bite to the right middle finger.  Patient has been on IV antibiotics.  

Patient is seen and evaluated at bedside today and states that she is still 

having a lot of tenderness over the right middle finger and has pain if she 

tries to move the fingers of the right hand.  Patient states that she has been 

using heating pad. Patient denies any new complaints today.








Objective





- Vital Signs


Vital signs: 


                                   Vital Signs











Temp  98.7 F   06/16/23 07:50


 


Pulse  60   06/16/23 07:50


 


Resp  16   06/16/23 07:50


 


BP  122/79   06/16/23 07:50


 


Pulse Ox  97   06/16/23 07:50


 


FiO2      








                                 Intake & Output











 06/15/23 06/16/23 06/16/23





 18:59 06:59 18:59


 


Intake Total 598  


 


Balance 598  


 


Intake:   


 


  Oral 598  


 


Other:   


 


  Voiding Method  Toilet 


 


  # Voids 2 1 1














- Exam


On exam patient is resting comfortably in bed in no acute distress.  Patient is 

alert and oriented 3.  There is tenderness to palpation over the palmar aspect 

of the third MCP joint extending distally.  There is mild swelling over the 

dorsal aspect of the right hand.  There is no erythema and skin is intact. There

is no tenderness over proximal palm or wrist. Patient has limited motion of the 

fingers of the right hand due to pain. Sensation intact. Neurovascular status 

and circulatory status are intact.








- Labs


CBC & Chem 7: 


                                 06/16/23 05:32





                                 06/16/23 05:32





Assessment and Plan


(1) Cellulitis of right hand


Current Visit: Yes   Status: Acute   Code(s): L03.113 - CELLULITIS OF RIGHT 

UPPER LIMB   SNOMED Code(s): 97968751


   





(2) Flexor tenosynovitis of finger


Current Visit: Yes   Status: Acute   Code(s): M65.9 - SYNOVITIS AND 

TENOSYNOVITIS, UNSPECIFIED   SNOMED Code(s): 916499158


   





(3) History of dog bite


Current Visit: Yes   Status: Acute   Code(s): Z78.9 - OTHER SPECIFIED HEALTH 

STATUS   SNOMED Code(s): 300549358


   


Plan: 


1. Patient is NPO this morning. 


2. MRI report reveals mild-to-moderate third flexor tenosynovitis (either 

reactive or infections) with fluid extending from the level of the middle 

phalanx to the mid metacarpal level.


3. Patient's symptoms have not significantly improved with IV antibiotics. 

Planning on I&D of the right hand later today in the operating room.

## 2023-06-17 VITALS
SYSTOLIC BLOOD PRESSURE: 102 MMHG | RESPIRATION RATE: 16 BRPM | HEART RATE: 43 BPM | TEMPERATURE: 98.7 F | DIASTOLIC BLOOD PRESSURE: 56 MMHG

## 2023-06-17 RX ADMIN — HYDROCODONE BITARTRATE AND ACETAMINOPHEN PRN EACH: 5; 325 TABLET ORAL at 05:58

## 2023-06-17 RX ADMIN — APIXABAN SCH MG: 5 TABLET, FILM COATED ORAL at 08:46

## 2023-06-17 RX ADMIN — SODIUM CHLORIDE SCH MLS/HR: 9 INJECTION, SOLUTION INTRAVENOUS at 05:56

## 2023-06-17 RX ADMIN — PANTOPRAZOLE SODIUM SCH MG: 40 TABLET, DELAYED RELEASE ORAL at 05:56

## 2023-06-17 RX ADMIN — HYDROCODONE BITARTRATE AND ACETAMINOPHEN PRN EACH: 5; 325 TABLET ORAL at 09:35

## 2023-06-17 RX ADMIN — GABAPENTIN SCH MG: 300 CAPSULE ORAL at 05:56

## 2023-06-17 NOTE — P.DS
Providers


Date of admission: 


06/14/23 06:21





Expected date of discharge: 06/17/23


Attending physician: 


Tuan Contreras





Consults: 





                                        





06/14/23 17:27


Consult Physician Routine 


   Consulting Provider: Diogenes Kimbrough Jr


   Consult Reason/Comments: medical management


   Do you want consulting provider notified?: Yes











Primary care physician: 


[facility] Course: 


This patient is a 43- year old female who sustained a dog bite to her right 

middle finger earlier this month. She developed progressively worsening pain and

swelling of the finger, therefore she presented to [facility] emergency 

department on 6/14/23. Patient was admitted under the care of Dr. Contreras for 

concerns of flexor tenosynovitis. She was started on IV antibiotics. Patient 

developed worsening of symptoms, therefore MRI of the right hand was obtained. 

Surgical intervention was recommended. Patient underwent a right hand I&D on 

6/16/23 with Dr. Contreras. 





The procedure is performed without complication or sequela. Vital signs are 

stable. 


Today is post-op day #1. She is examined bedside. Patient states her right hand 

pain has improved significantly. She overall feels well and denies chest pain, 

shortness of breath, nausea, vomiting, fevers, chills. She would like to return 

home today. 





On examination, patient is sitting up in bed in no acute distress. She is alert 

and orientated x3. On inspection of the right hand, there is a surgical dressing

in place. Dressing is taken down and reveals a well-approximated incision at the

palmar middle finger. Intact nylon sutures. No surrouding erythema. No active 

drainage. Motor and sensory function intact. Right middle finger is warm and 

well perfused with brisk capillary refill. 





Patient is discharged home in good condition pending medical clearance. Patient 

should follow-up at [facility] next week. Please see med rec for 

accurate list of discharge medications. 





Patient Condition at Discharge: Stable





Plan - Discharge Summary


Discharge Rx Participant: No


New Discharge Prescriptions: 


New


   HYDROcodone/APAP 5-325MG [Norco 5-325] 1 tab PO Q6HR PRN #30 tab


     PRN Reason: Pain


   Sennosides [Senokot] 2 tab PO DAILY PRN #60 tablet


     PRN Reason: Constipation


   Amoxic-Pot Clav 875-125Mg [Augmentin 875-125] 1 tab PO Q12HR 10 Days #20 tab





No Action


   traMADol HCL [Ultram] 50 mg PO Q6H


   Apixaban [Eliquis] 5 mg PO BID


   Gabapentin 1,200 mg PO HS


   Gabapentin 600 mg PO BID@0700,1300


Discharge Medication List





traMADol HCL [Ultram] 50 mg PO Q6H 09/17/18 [History]


Gabapentin 600 mg PO BID@0700,1300 07/15/21 [History]


Apixaban [Eliquis] 5 mg PO BID 07/13/22 [History]


Gabapentin 1,200 mg PO HS 06/14/23 [History]


Amoxic-Pot Clav 875-125Mg [Augmentin 875-125] 1 tab PO Q12HR 10 Days #20 tab 

06/16/23 [Rx]


HYDROcodone/APAP 5-325MG [Norco 5-325] 1 tab PO Q6HR PRN #30 tab 06/16/23 [Rx]


Sennosides [Senokot] 2 tab PO DAILY PRN #60 tablet 06/16/23 [Rx]








Follow up Appointment(s)/Referral(s): 


Diogenes Kimbrough Jr, DO [Primary Care Provider] - 1-2 days


Tuan Contreras DO [Doctor of Osteopathic Medicine] - 06/21/23


Patient Instructions/Handouts:  *Surgery [facility] - Scopalamine Patch Instructions


Activity/Diet/Wound Care/Special Instructions: 


Please keep incision clean and dry. May shower after 48 hours if no drainage 

from the incision.


Daily dressing changes. 


Sutures to be removed in 7-10 days.


Please take medications as prescribed.


Please call [facility] with any questions or concerns, [phone]. 

Please call to make an appointment for follow up at [facility] on 

6/21/2023.


Discharge Disposition: HOME SELF-CARE

## 2024-04-29 ENCOUNTER — HOSPITAL ENCOUNTER (EMERGENCY)
Dept: HOSPITAL 47 - EC | Age: 44
Discharge: HOME | End: 2024-04-29
Payer: COMMERCIAL

## 2024-04-29 VITALS — TEMPERATURE: 99.2 F | RESPIRATION RATE: 16 BRPM

## 2024-04-29 VITALS — DIASTOLIC BLOOD PRESSURE: 77 MMHG | HEART RATE: 51 BPM | SYSTOLIC BLOOD PRESSURE: 121 MMHG

## 2024-04-29 DIAGNOSIS — Z11.52: ICD-10-CM

## 2024-04-29 DIAGNOSIS — G43.909: Primary | ICD-10-CM

## 2024-04-29 DIAGNOSIS — F17.200: ICD-10-CM

## 2024-04-29 LAB
ALBUMIN SERPL-MCNC: 3.8 G/DL (ref 3.5–5)
ALP SERPL-CCNC: 44 U/L (ref 38–126)
ALT SERPL-CCNC: 19 U/L (ref 4–34)
ANION GAP SERPL CALC-SCNC: 4 MMOL/L
APTT BLD: 25.2 SEC (ref 22–30)
AST SERPL-CCNC: 25 U/L (ref 14–36)
BASOPHILS # BLD AUTO: 0.1 K/UL (ref 0–0.2)
BASOPHILS NFR BLD AUTO: 1 %
BUN SERPL-SCNC: 12 MG/DL (ref 7–17)
CALCIUM SPEC-MCNC: 9 MG/DL (ref 8.4–10.2)
CHLORIDE SERPL-SCNC: 105 MMOL/L (ref 98–107)
CO2 SERPL-SCNC: 29 MMOL/L (ref 22–30)
EOSINOPHIL # BLD AUTO: 0.3 K/UL (ref 0–0.7)
EOSINOPHIL NFR BLD AUTO: 4 %
ERYTHROCYTE [DISTWIDTH] IN BLOOD BY AUTOMATED COUNT: 4.55 M/UL (ref 3.8–5.4)
ERYTHROCYTE [DISTWIDTH] IN BLOOD: 12.5 % (ref 11.5–15.5)
GLUCOSE SERPL-MCNC: 76 MG/DL (ref 74–99)
HCT VFR BLD AUTO: 43 % (ref 34–46)
HGB BLD-MCNC: 13.7 GM/DL (ref 11.4–16)
INR PPP: 1 (ref ?–1.2)
LIPASE SERPL-CCNC: 149 U/L (ref 23–300)
LYMPHOCYTES # SPEC AUTO: 4.4 K/UL (ref 1–4.8)
LYMPHOCYTES NFR SPEC AUTO: 47 %
MCH RBC QN AUTO: 30.1 PG (ref 25–35)
MCHC RBC AUTO-ENTMCNC: 31.9 G/DL (ref 31–37)
MCV RBC AUTO: 94.4 FL (ref 80–100)
MONOCYTES # BLD AUTO: 0.7 K/UL (ref 0–1)
MONOCYTES NFR BLD AUTO: 7 %
NEUTROPHILS # BLD AUTO: 3.6 K/UL (ref 1.3–7.7)
NEUTROPHILS NFR BLD AUTO: 38 %
PH UR: 6 [PH] (ref 5–8)
PLATELET # BLD AUTO: 326 K/UL (ref 150–450)
POTASSIUM SERPL-SCNC: 4 MMOL/L (ref 3.5–5.1)
PROT SERPL-MCNC: 6.9 G/DL (ref 6.3–8.2)
PT BLD: 10.6 SEC (ref 10–12.5)
RBC UR QL: 1 /HPF (ref 0–5)
SODIUM SERPL-SCNC: 138 MMOL/L (ref 137–145)
SP GR UR: 1.03 (ref 1–1.03)
SQUAMOUS UR QL AUTO: 1 /HPF (ref 0–4)
UROBILINOGEN UR QL STRIP: 2 MG/DL (ref ?–2)
WBC # BLD AUTO: 9.3 K/UL (ref 3.8–10.6)
WBC #/AREA URNS HPF: 7 /HPF (ref 0–5)

## 2024-04-29 PROCEDURE — 85730 THROMBOPLASTIN TIME PARTIAL: CPT

## 2024-04-29 PROCEDURE — 81001 URINALYSIS AUTO W/SCOPE: CPT

## 2024-04-29 PROCEDURE — 70496 CT ANGIOGRAPHY HEAD: CPT

## 2024-04-29 PROCEDURE — 99284 EMERGENCY DEPT VISIT MOD MDM: CPT

## 2024-04-29 PROCEDURE — 85025 COMPLETE CBC W/AUTO DIFF WBC: CPT

## 2024-04-29 PROCEDURE — 36415 COLL VENOUS BLD VENIPUNCTURE: CPT

## 2024-04-29 PROCEDURE — 87636 SARSCOV2 & INF A&B AMP PRB: CPT

## 2024-04-29 PROCEDURE — 70498 CT ANGIOGRAPHY NECK: CPT

## 2024-04-29 PROCEDURE — 93005 ELECTROCARDIOGRAM TRACING: CPT

## 2024-04-29 PROCEDURE — 70450 CT HEAD/BRAIN W/O DYE: CPT

## 2024-04-29 PROCEDURE — 83690 ASSAY OF LIPASE: CPT

## 2024-04-29 PROCEDURE — 74174 CTA ABD&PLVS W/CONTRAST: CPT

## 2024-04-29 PROCEDURE — 85610 PROTHROMBIN TIME: CPT

## 2024-04-29 PROCEDURE — 80053 COMPREHEN METABOLIC PANEL: CPT

## 2024-04-29 RX ADMIN — NICARDIPINE HYDROCHLORIDE STA MLS/HR: 2.5 INJECTION INTRAVENOUS at 20:37

## 2024-04-29 NOTE — CT
EXAMINATION TYPE: CT angio head neck

CT DLP: 1330.4 mGycm, Automated exposure control for dose reduction was used.

 

DATE OF EXAM: 4/29/2024 8:54 PM

 

COMPARISON: CT brain same day .  

 

CLINICAL INDICATION:Female, 44 years old with history of recurrent worsening migraines, on thinners; 
PHH, recurrent worsening migraines with abdominal pain, on thinners.

 

TECHNIQUE: Axially acquired helical CT angiogram of the head and neck was obtained with contrast. Axi
al images are supplemented with 3D reconstructions  and MIP images which were post-processed at an in
dependent workstation. NASCET criteria used.

Contrast used:65 CC mL of Isovue 370 without and with IV Contrast, 

Oral contrast used: None. 

 

FINDINGS:

 

CTA HEAD:

No evidence of acute intracranial hemorrhage, mass effect, or midline shift. The ventricles, sulci, a
nd cisterns are unremarkable. 

 

The visualized portions of the internal carotid arteries, middle cerebral arteries, anterior cerebral
 arteries, and posterior cerebral arteries are patent. 

 

The basilar and vertebral arteries are patent. 

 

CTA NECK:

Right Carotid System: 

The common carotid artery and external carotid artery are patent. The carotid bifurcation demonstrate
s no evidence of hemodynamically significant stenosis. The remaining portions of the internal carotid
 artery demonstrate normal size without significant narrowing.

 

Left Carotid System: 

The common carotid artery and external carotid artery are patent. The carotid bifurcation demonstrate
s no evidence of hemodynamically significant stenosis. The remaining portions of the internal carotid
 artery demonstrate normal size without significant narrowing.

 

Vertebral arteries are patent without evidence hemodynamically significant stenosis.

 

There is a three-vessel aortic arch. The origins of the great vessels are patent. No evidence of hemo
dynamically significant stenosis.

 

Left thyroid nodule measuring 14 mm. Right thyroid nodules measuring up to 12 mm.

 

The lingual tonsils are enlarged.

 

 

IMPRESSION:

1. No evidence of dissection of the cervical internal carotid arteries or vertebral arteries or any e
vidence of significant stenosis at the carotid bifurcations.  

2. No evidence of intracranial high-grade stenosis or intracranial aneurysm. 

3. Lingual tonsil enlargement.

## 2024-04-29 NOTE — ED
General Adult HPI





- General


Chief complaint: Headache


Stated complaint: Migraine


Time Seen by Provider: 04/29/24 19:15


Source: patient, RN notes reviewed, old records reviewed


Mode of arrival: ambulatory


Limitations: no limitations





- History of Present Illness


Initial comments: 





Patient is a 44-year-old female presents emergency department with worsening 

persistent migraines.  Currently does not have a migraine but has never she gets

her typical ocular migraines multiple times per day.  This is abnormal for her. 

Has been worse over the last month.  Denies any head trauma.  Is on Eliquis due 

to genetic disorder.  Denies any chest pain or shortness of breath.  Does 

endorse nonspecific abdominal discomfort.  Denies nausea or vomiting or s

hortness of breath.  Denies any diarrhea.  Presents for further evaluation at 

this time.





- Related Data


                                Home Medications











 Medication  Instructions  Recorded  Confirmed


 


traMADol HCL [Ultram] 50 mg PO Q6H 09/17/18 06/14/23


 


Gabapentin 600 mg PO BID@0700,1300 07/15/21 06/14/23


 


Apixaban [Eliquis] 5 mg PO BID 07/13/22 06/14/23


 


Gabapentin 1,200 mg PO HS 06/14/23 06/14/23








                                  Previous Rx's











 Medication  Instructions  Recorded


 


Amoxic-Pot Clav 875-125Mg 1 tab PO Q12HR 10 Days #20 tab 06/16/23





[Augmentin 875-125]  


 


HYDROcodone/APAP 5-325MG [Norco 1 tab PO Q6HR PRN #30 tab 06/16/23





5-325]  


 


Sennosides [Senokot] 2 tab PO DAILY PRN #60 tablet 06/16/23











                                    Allergies











Allergy/AdvReac Type Severity Reaction Status Date / Time


 


No Known Allergies Allergy   Verified 04/29/24 18:57














Review of Systems


ROS Statement: 


Those systems with pertinent positive or pertinent negative responses have been 

documented in the HPI.


Review of Systems:


CONST: Denies fever


EYES: Denies blurry vision


ENT: Denies nasal congestion


C/V: Denies Chest pain


RESP: Denies shortness of breath


GI: Endorses mild abdominal discomfort


: Denies dysuria


SKIN: Denies rash.


MSK: Denies joint pain.


NEURO: Denies headache





ROS Other: All systems not noted in ROS Statement are negative.





Past Medical History


Past Medical History: Blood Disorder, Musculoskeletal Disorder, Osteoarthritis 

(OA), Pulmonary Embolus (PE), Sleep Apnea/CPAP/BIPAP


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

 2003, severe spondylosis of back, right hand dog bite 6/2023.  COVID VACCINATED


History of Any Multi-Drug Resistant Organisms: None Reported


Past Surgical History: Bariatric Surgery, Tonsillectomy


Additional Past Surgical History / Comment(s): splenectomy, EGD, gastric sleeve 

(9/18/17 Dr. Lora Arias) panniculectomy 7-19-21


Past Anesthesia/Blood Transfusion Reactions: Motion Sickness, Postoperative 

Nausea & Vomiting (PONV)


Past Psychological History: Depression


Smoking Status: Current every day smoker


Past Alcohol Use History: None Reported


Past Drug Use History: None Reported





- Past Family History


  ** Mother


Family Medical History: Blood Disorder, Pulmonary Embolus


Additional Family Medical History / Comment(s): mom has factor 2 also





  ** Father


Family Medical History: Myocardial Infarction (MI)


Additional Family Medical History / Comment(s): passed at age 63





General Exam





- General Exam Comments


Initial Comments: 





General: Appears in no acute distress.


HEAD: Normal with no signs of head trauma.


EYES: PERRLA, EOMI, conjunctiva normal, no discharge.  Pupils are 3 mm and equal

 bilaterally.


ENT: Hearing grossly intact, normal oropharynx.


RESPIRATORY: Clear breath sounds bilaterally.  No wheezes, rales, or rhonchi.


C/V: Regular rate and rhythm. S1 and S2 auscultated, no edema, peripheral pulses

 2+ and intact throughout


ABD: Abd is soft, nontender, nondistended.  No guarding.  No rebound tenderness.

  No peritoneal signs.


EXT: Normal range of motion, no obvious deformity


SKIN: No rashes or lesions observed on exposed skin.


NEURO: Alert and oriented x 4.  NIH is 0.  GCS 15.  No focal deficits.





Limitations: no limitations





Course





                                   Vital Signs











  04/29/24





  18:55


 


Temperature 99.2 F


 


Pulse Rate 61


 


Respiratory 16





Rate 


 


Blood Pressure 107/72


 


O2 Sat by Pulse 99





Oximetry 














Medical Decision Making





- Medical Decision Making





Was pt. sent in by a medical professional or institution (, PA, NP, urgent 

care, hospital, or nursing home...) When possible be specific


@  -No


Did you speak to anyone other than the patient for history (EMS, parent, family,

 police, friend...)? What history was obtained from this source 


@  -No


Did you review nursing and triage notes (agree or disagree)?  Why? 


@  -I reviewed and agree with nursing and triage notes


Were old charts reviewed (outside hosp., previous admission, EMS record, old 

EKG, old radiological studies, urgent care reports/EKG's, nursing home records)?

 Report findings 


@  -Old charts reviewed


Differential Diagnosis (chest pain, altered mental status, abdominal pain women,

 abdominal pain men, vaginal bleeding, weakness, fever, dyspnea, syncope, 

headache, dizziness, GI bleed, back pain, seizure, CVA, palpatations, mental 

health, musculoskeletal)? 


@  -Differential Headache:


Migraine, tension, cluster, carbon monoxide, central venous thrombosis, pension 

karma temporal arteritis, acute closure glaucoma, intercranial hemorrhage, 

mastoiditis, sinusitis, head injury, this is not meant to be an all-inclusive 

list.





EKG interpreted by me (3pts min.).


@  -As above


X-rays interpreted by me (1pt min.).


@  -None done


CT interpreted by me (1pt min.).


@  -CT brain, CT angiogram head reveals no obvious acute process or bleed or 

injury.  CT a of the abdomen pelvis reveals no obvious acute intra-abdominal 

process other than possible colitis of the transverse colon.  Has a known 

spondylolysis of L5 on S1.


U/S interpreted by me (1pt. min.).


@  -None done


What testing was considered but not performed or refused? (CT, X-rays, U/S, 

labs)? Why?


@  -None


What meds were considered but not given or refused? Why?


@  -Offered analgesia medications which were declined


Did you discuss the management of the patient with other professionals 

(professionals i.e. , PA, NP, lab, RT, psych nurse, , , 

teacher, , )? Give summary


@  -No


Was smoking cessation discussed for >3mins.?


@  -No


Was critical care preformed (if so, how long)?


@  -No


Were there social determinants of health that impacted care today? How? 

(Homelessness, low income, unemployed, alcoholism, drug addiction, 

transportation, low edu. Level, literacy, decrease access to med. care, half-way, 

rehab)?


@  -No


Was there de-escalation of care discussed even if they declined (Discuss DNR or 

withdrawal of care, Hospice)? DNR status


@  -No


What co-morbidities impacted this encounter? (DM, HTN, Smoking, COPD, CAD, 

Cancer, CVA, ARF, Chemo, Hep., AIDS, mental health diagnosis, sleep apnea, 

morbid obesity)?


@  -Migraines


Was patient admitted / discharged? Hospital course, mention meds given and 

route, prescriptions, significant lab abnormalities, going to OR and other 

pertinent info.


@  -Based on the patient's presentation and physical exam, presents emergency 

department complaining of more persistent migraines lately.  Also has concern 

regarding her abdomen as she feels she can feel a pulsating mass in it.  Is 

asking for imaging of her abdomen and further imaging of brain.  Due to her 

being on blood thinners, and concern for possible bleeding and therefore I did 

recommend CT imaging of the brain and as well as abdomen pelvis.  Patient was in

 agreement this plan.  We also obtain basic labs.  Currently has no headaches.  

Vital signs within acceptable limits.  She will be given 1 L fluid bolus.





Laboratory studies unremarkable.  CT imaging also unremarkable of the brain.  CT

 of the abdomen pelvis reveals no obvious acute pathology with the aorta.  There

 is possible colitis of the transverse colon as well as a known spondylolysis of

 L5 on S1.





On reevaluation I updated the patient.  Reviewed imaging.  She expressed 

understanding.  I believe it is safer to be discharged home.  She was in 

agreement this plan.  She will be discharged home at this time.  Patient was in 

agreement this plan.  Patient is aware of the spondylolysis.





I instructed the patient to follow up with their PCP in the next 1-3 days.  .  I

 explained that the patient should return to the emergency department if they 

experience any worsening symptoms. Strict return precautions were discussed with

 the patient. The patient expressed understanding of these instructions. I 

answered all questions that the patient had. The patient was discharged home in 

good condition with their prescriptions and follow up information.








Undiagnosed new problem with uncertain prognosis?


@  -No


Drug Therapy requiring intensive monitoring for toxicity (Heparin, Nitro, 

Insulin, Cardizem)?


@  -No


Were any procedures done?


@  -No


Diagnosis/symptom?


@  -Migraine headaches


Acute, or Chronic, or Acute on Chronic?


@  -Acute on chronic


Uncomplicated (without systemic symptoms) or Complicated (systemic symptoms)?


@  -Uncomplicated


Side effects of treatment?


@  -No


Exacerbation, Progression, or Severe Exacerbation?


@  -No


Poses a threat to life or bodily function? How? (Chest pain, USA, MI, pneumonia,

 PE, COPD, DKA, ARF, appy, cholecystitis, CVA, Diverticulitis, Homicidal, 

Suicidal, threat to staff... and all critical care pts)


@  -Unlikely








- Lab Data


Result diagrams: 


                                 04/29/24 19:34





                                 04/29/24 19:34





                                   Lab Results











  04/29/24 04/29/24 04/29/24 Range/Units





  19:34 19:34 19:34 


 


WBC  9.3    (3.8-10.6)  k/uL


 


RBC  4.55    (3.80-5.40)  m/uL


 


Hgb  13.7    (11.4-16.0)  gm/dL


 


Hct  43.0    (34.0-46.0)  %


 


MCV  94.4    (80.0-100.0)  fL


 


MCH  30.1    (25.0-35.0)  pg


 


MCHC  31.9    (31.0-37.0)  g/dL


 


RDW  12.5    (11.5-15.5)  %


 


Plt Count  326    (150-450)  k/uL


 


MPV  7.1    


 


Neutrophils %  38    %


 


Lymphocytes %  47    %


 


Monocytes %  7    %


 


Eosinophils %  4    %


 


Basophils %  1    %


 


Neutrophils #  3.6    (1.3-7.7)  k/uL


 


Lymphocytes #  4.4    (1.0-4.8)  k/uL


 


Monocytes #  0.7    (0-1.0)  k/uL


 


Eosinophils #  0.3    (0-0.7)  k/uL


 


Basophils #  0.1    (0-0.2)  k/uL


 


PT   10.6   (10.0-12.5)  sec


 


INR   1.0   (<1.2)  


 


APTT   25.2   (22.0-30.0)  sec


 


Sodium    138  (137-145)  mmol/L


 


Potassium    4.0  (3.5-5.1)  mmol/L


 


Chloride    105  ()  mmol/L


 


Carbon Dioxide    29  (22-30)  mmol/L


 


Anion Gap    4  mmol/L


 


BUN    12  (7-17)  mg/dL


 


Creatinine    0.70  (0.52-1.04)  mg/dL


 


Est GFR (CKD-EPI)AfAm    >90  (>60 ml/min/1.73 sqM)  


 


Est GFR (CKD-EPI)NonAf    >90  (>60 ml/min/1.73 sqM)  


 


Glucose    76  (74-99)  mg/dL


 


Calcium    9.0  (8.4-10.2)  mg/dL


 


Total Bilirubin    0.3  (0.2-1.3)  mg/dL


 


AST    25  (14-36)  U/L


 


ALT    19  (4-34)  U/L


 


Alkaline Phosphatase    44  ()  U/L


 


Total Protein    6.9  (6.3-8.2)  g/dL


 


Albumin    3.8  (3.5-5.0)  g/dL


 


Lipase    149  ()  U/L


 


Urine Color     


 


Urine Appearance     (Clear)  


 


Urine pH     (5.0-8.0)  


 


Ur Specific Gravity     (1.001-1.035)  


 


Urine Protein     (Negative)  


 


Urine Glucose (UA)     (Negative)  


 


Urine Ketones     (Negative)  


 


Urine Blood     (Negative)  


 


Urine Nitrite     (Negative)  


 


Urine Bilirubin     (Negative)  


 


Urine Urobilinogen     (<2.0)  mg/dL


 


Ur Leukocyte Esterase     (Negative)  


 


Urine RBC     (0-5)  /hpf


 


Urine WBC     (0-5)  /hpf


 


Ur Squamous Epith Cells     (0-4)  /hpf


 


Urine Bacteria     (None)  /hpf


 


Urine Mucus     (None)  /hpf


 


Influenza Type A (PCR)     (Not Detectd)  


 


Influenza Type B (PCR)     (Not Detectd)  


 


RSV (PCR)     (Not Detectd)  


 


SARS-CoV-2 (PCR)     (Not Detectd)  














  04/29/24 04/29/24 Range/Units





  19:34 19:34 


 


WBC    (3.8-10.6)  k/uL


 


RBC    (3.80-5.40)  m/uL


 


Hgb    (11.4-16.0)  gm/dL


 


Hct    (34.0-46.0)  %


 


MCV    (80.0-100.0)  fL


 


MCH    (25.0-35.0)  pg


 


MCHC    (31.0-37.0)  g/dL


 


RDW    (11.5-15.5)  %


 


Plt Count    (150-450)  k/uL


 


MPV    


 


Neutrophils %    %


 


Lymphocytes %    %


 


Monocytes %    %


 


Eosinophils %    %


 


Basophils %    %


 


Neutrophils #    (1.3-7.7)  k/uL


 


Lymphocytes #    (1.0-4.8)  k/uL


 


Monocytes #    (0-1.0)  k/uL


 


Eosinophils #    (0-0.7)  k/uL


 


Basophils #    (0-0.2)  k/uL


 


PT    (10.0-12.5)  sec


 


INR    (<1.2)  


 


APTT    (22.0-30.0)  sec


 


Sodium    (137-145)  mmol/L


 


Potassium    (3.5-5.1)  mmol/L


 


Chloride    ()  mmol/L


 


Carbon Dioxide    (22-30)  mmol/L


 


Anion Gap    mmol/L


 


BUN    (7-17)  mg/dL


 


Creatinine    (0.52-1.04)  mg/dL


 


Est GFR (CKD-EPI)AfAm    (>60 ml/min/1.73 sqM)  


 


Est GFR (CKD-EPI)NonAf    (>60 ml/min/1.73 sqM)  


 


Glucose    (74-99)  mg/dL


 


Calcium    (8.4-10.2)  mg/dL


 


Total Bilirubin    (0.2-1.3)  mg/dL


 


AST    (14-36)  U/L


 


ALT    (4-34)  U/L


 


Alkaline Phosphatase    ()  U/L


 


Total Protein    (6.3-8.2)  g/dL


 


Albumin    (3.5-5.0)  g/dL


 


Lipase    ()  U/L


 


Urine Color  Yellow   


 


Urine Appearance  Clear   (Clear)  


 


Urine pH  6.0   (5.0-8.0)  


 


Ur Specific Gravity  1.031   (1.001-1.035)  


 


Urine Protein  Trace H   (Negative)  


 


Urine Glucose (UA)  Negative   (Negative)  


 


Urine Ketones  Negative   (Negative)  


 


Urine Blood  Small H   (Negative)  


 


Urine Nitrite  Negative   (Negative)  


 


Urine Bilirubin  Negative   (Negative)  


 


Urine Urobilinogen  2.0   (<2.0)  mg/dL


 


Ur Leukocyte Esterase  Small H   (Negative)  


 


Urine RBC  1   (0-5)  /hpf


 


Urine WBC  7 H   (0-5)  /hpf


 


Ur Squamous Epith Cells  1   (0-4)  /hpf


 


Urine Bacteria  Occasional H   (None)  /hpf


 


Urine Mucus  Few H   (None)  /hpf


 


Influenza Type A (PCR)   Not Detected  (Not Detectd)  


 


Influenza Type B (PCR)   Not Detected  (Not Detectd)  


 


RSV (PCR)   Not Detected  (Not Detectd)  


 


SARS-CoV-2 (PCR)   Not Detected  (Not Detectd)  














- EKG Data


-: EKG Interpreted by Me


EKG Comments: 





12-lead Electrocardiogram Interpretation Note





EKG was reviewed and interpreted by myself. 12-lead ECG performed at 1941 is 

interpreted by me as revealing sinus bradycardia at a rate of 55 beats per 

minute.  Axis is normal.  CT interval is 136 ms, QRS duration is 89 ms, QTc is 

386 seconds.  Isolated T wave inversion in lead III..  There were no ST or T 

wave abnormalities to suggest myocardial ischemia or injury.  R wave progression

 across the precordium was satisfactory.  By my interpretation this EKG is non-

diagnostic for acute ischemia.








Disposition


Clinical Impression: 


 Migraines





Disposition: HOME SELF-CARE


Condition: Good


Instructions (If sedation given, give patient instructions):  Acute Headache 

(ED)


Is patient prescribed a controlled substance at d/c from ED?: No


Referrals: 


Diogenes Kimbrough Jr, DO [Primary Care Provider] - 1-2 days


Time of Disposition: 21:57

## 2024-04-29 NOTE — CT
EXAMINATION TYPE: CT brain wo con

CT DLP: 283.3 mGycm, Automated exposure control for dose reduction was used.

 

DATE OF EXAM: 4/29/2024 8:33 PM

 

COMPARISON: None.

 

CLINICAL INDICATION:Female, 44 years old with history of recurrent worsening migraines, on thinners, 
recurrent worsening migraines with abdominal pain, on thinners.

 

TECHNIQUE: 

Brain: Axial CT images of the brain were obtained with coronal and sagittal reformats created and rev
iewed.

Contrast used: None.

Oral contrast used: None.

 

FINDINGS:

 

Brain:

Extra-axial spaces: No abnormal extra-axial fluid collections.

Ventricular system: Within normal limits

Cerebral parenchyma: No acute intraparenchymal hemorrhage or mass effect.  The gray-white junction is
 well differentiated.     

Cerebellum: Unremarkable.

Mass effect: No evidence of midline shift.

Intracranial vasculature: unremarkable

Soft tissues: Normal.

Calvarium/osseous structures: No depressed skull fracture.

Paranasal sinuses and mastoid air cells: Mild scattered paranasal sinus disease.

Visualized orbits: Orbital contents are intact.

 

 

IMPRESSION:

No acute intracranial process.

## 2024-04-29 NOTE — CT
EXAMINATION TYPE: CT angio abdomen pelvis

CT DLP: 589.7 mGycm, Automated exposure control for dose reduction was used.

 

DATE OF EXAM: 4/29/2024 8:56 PM

 

COMPARISON: . None

 

CLINICAL INDICATION:Female, 44 years old with history of recurrent worsening migraines, on thinners; 
PHH, recurrent worsening migraines with abdominal pain, on thinners.

 

TECHNIQUE: Multiple thin slice sub-millimeter images were obtained after administration of contrast. 
 3-D reconstructed images and maximum intensity projection images were obtained. CT angio abdomen pel
vis

CT Contrast:

Contrast used:100 mL of Isovue 370 without and with IV Contrast, 

Oral contrast used: without Oral Contrast None

 

FINDINGS:

CTA Abdomen and pelvis: The abdominal aorta does not demonstrate aneurysmal dilatation.  No significa
nt atherosclerosis identified..  The origins of the superior mesenteric artery, renal arteries, infer
ior mesenteric artery, and celiac axis are patent.  The iliac vessels are normal in morphology

 

LOWER CHEST: No evidence of focal consolidation, pneumothorax or pleural effusion.

 

LIVER: Unremarkable

GALLBLADDER AND BILE DUCTS: The gallbladder surgically absent.

PANCREAS: Unremarkable.

SPLEEN: Unremarkable.

ADRENAL GLANDS: Unremarkable.

KIDNEYS AND URETERS: No evidence of hydronephrosis or renal calculus. The ureters are unremarkable.  


 

PELVIS

BLADDER: Unremarkable

REPRODUCTIVE: Intrauterine device seen within the endometrium.

 

ABDOMEN & PELVIS

STOMACH AND BOWEL: No evidence of bowel obstruction. Postsurgical changes to the gastric lumen. There
 is circumferential wall thickening of the transverse colon measuring up to 5 mm. The colon is is inc
ompletely distended however.

PERITONEUM: No evidence of pneumoperitoneum or free fluid.

 

VASCULATURE: No evidence of aortic aneurysm. 

MUSCULOSKELETAL: Grade 3 anterolisthesis of L5 on S1 with bilateral spondylolysis. There is severe bi
lateral L5-S1 neural foraminal stenosis as a result.

LYMPH NODES: No gross evidence for lymphadenopathy.

SOFT TISSUE/ABDOMINAL WALL: Unremarkable

 

IMPRESSION

1.  No evidence of vascular occlusion.

2. Circumferential wall thickening of the transverse colon correlate for colitis.

3. Postcholecystectomy changes with physiologic dilation of the extrahepatic biliary system.

4. Post surgical changes gastric lumen.

5. Grade 3 anterolisthesis of L5 on S1 with bilateral spondylolysis. Surgical consultation recommende
d. This results in severe bilateral neural foraminal stenosis.

6. IUD in appropriate position.